# Patient Record
Sex: MALE | Race: BLACK OR AFRICAN AMERICAN | NOT HISPANIC OR LATINO | Employment: OTHER | URBAN - METROPOLITAN AREA
[De-identification: names, ages, dates, MRNs, and addresses within clinical notes are randomized per-mention and may not be internally consistent; named-entity substitution may affect disease eponyms.]

---

## 2017-03-13 PROBLEM — R51.9 HEADACHE: Status: ACTIVE | Noted: 2017-03-13

## 2017-03-13 PROBLEM — M79.89 FOOT SWELLING: Status: ACTIVE | Noted: 2017-03-13

## 2017-06-05 DIAGNOSIS — M54.50 LUMBAGO: Primary | ICD-10-CM

## 2017-06-06 DIAGNOSIS — M54.50 LUMBAGO: Primary | ICD-10-CM

## 2017-08-16 DIAGNOSIS — I95.1 ORTHOSTATIC HYPOTENSION DYSAUTONOMIC SYNDROME: Primary | ICD-10-CM

## 2017-08-31 ENCOUNTER — HOSPITAL ENCOUNTER (OUTPATIENT)
Facility: HOSPITAL | Age: 82
Discharge: NURSING, REHAB OR LONG-TERM CARE | End: 2017-09-02
Attending: EMERGENCY MEDICINE | Admitting: INTERNAL MEDICINE
Payer: MEDICARE

## 2017-08-31 DIAGNOSIS — R41.0 DELIRIUM: Primary | ICD-10-CM

## 2017-08-31 LAB
ALBUMIN SERPL BCP-MCNC: 3.1 G/DL
ALP SERPL-CCNC: 51 U/L
ALT SERPL W/O P-5'-P-CCNC: 17 U/L
ANION GAP SERPL CALC-SCNC: 7 MMOL/L
AST SERPL-CCNC: 20 U/L
BASOPHILS # BLD AUTO: 0.01 K/UL
BASOPHILS NFR BLD: 0.1 %
BILIRUB SERPL-MCNC: 1.2 MG/DL
BUN SERPL-MCNC: 15 MG/DL
CALCIUM SERPL-MCNC: 9 MG/DL
CHLORIDE SERPL-SCNC: 105 MMOL/L
CO2 SERPL-SCNC: 25 MMOL/L
CREAT SERPL-MCNC: 1 MG/DL
DIFFERENTIAL METHOD: ABNORMAL
EOSINOPHIL # BLD AUTO: 0.1 K/UL
EOSINOPHIL NFR BLD: 0.8 %
ERYTHROCYTE [DISTWIDTH] IN BLOOD BY AUTOMATED COUNT: 12.3 %
EST. GFR  (AFRICAN AMERICAN): >60 ML/MIN/1.73 M^2
EST. GFR  (NON AFRICAN AMERICAN): >60 ML/MIN/1.73 M^2
GLUCOSE SERPL-MCNC: 115 MG/DL
HCT VFR BLD AUTO: 35.7 %
HGB BLD-MCNC: 12.4 G/DL
LACTATE SERPL-SCNC: 1.9 MMOL/L
LIPASE SERPL-CCNC: 13 U/L
LYMPHOCYTES # BLD AUTO: 2 K/UL
LYMPHOCYTES NFR BLD: 25.9 %
MAGNESIUM SERPL-MCNC: 2 MG/DL
MCH RBC QN AUTO: 32.9 PG
MCHC RBC AUTO-ENTMCNC: 34.7 G/DL
MCV RBC AUTO: 95 FL
MONOCYTES # BLD AUTO: 0.5 K/UL
MONOCYTES NFR BLD: 6.3 %
NEUTROPHILS # BLD AUTO: 5.1 K/UL
NEUTROPHILS NFR BLD: 66.4 %
PHOSPHATE SERPL-MCNC: 2.5 MG/DL
PLATELET # BLD AUTO: 133 K/UL
PMV BLD AUTO: 10.1 FL
POTASSIUM SERPL-SCNC: 4.2 MMOL/L
PROT SERPL-MCNC: 6.6 G/DL
RBC # BLD AUTO: 3.77 M/UL
SODIUM SERPL-SCNC: 137 MMOL/L
WBC # BLD AUTO: 7.62 K/UL

## 2017-08-31 PROCEDURE — 83605 ASSAY OF LACTIC ACID: CPT

## 2017-08-31 PROCEDURE — P9612 CATHETERIZE FOR URINE SPEC: HCPCS

## 2017-08-31 PROCEDURE — 99285 EMERGENCY DEPT VISIT HI MDM: CPT | Mod: 25

## 2017-08-31 PROCEDURE — 93010 ELECTROCARDIOGRAM REPORT: CPT | Mod: ,,, | Performed by: INTERNAL MEDICINE

## 2017-08-31 PROCEDURE — 85025 COMPLETE CBC W/AUTO DIFF WBC: CPT

## 2017-08-31 PROCEDURE — 93005 ELECTROCARDIOGRAM TRACING: CPT

## 2017-08-31 PROCEDURE — 87040 BLOOD CULTURE FOR BACTERIA: CPT | Mod: 59

## 2017-08-31 PROCEDURE — 83735 ASSAY OF MAGNESIUM: CPT

## 2017-08-31 PROCEDURE — 99285 EMERGENCY DEPT VISIT HI MDM: CPT | Mod: ,,, | Performed by: EMERGENCY MEDICINE

## 2017-08-31 PROCEDURE — 80053 COMPREHEN METABOLIC PANEL: CPT

## 2017-08-31 PROCEDURE — 84100 ASSAY OF PHOSPHORUS: CPT

## 2017-08-31 PROCEDURE — 63600175 PHARM REV CODE 636 W HCPCS: Performed by: STUDENT IN AN ORGANIZED HEALTH CARE EDUCATION/TRAINING PROGRAM

## 2017-08-31 PROCEDURE — 83690 ASSAY OF LIPASE: CPT

## 2017-08-31 PROCEDURE — 96372 THER/PROPH/DIAG INJ SC/IM: CPT

## 2017-08-31 RX ORDER — HALOPERIDOL 5 MG/ML
5 INJECTION INTRAMUSCULAR
Status: COMPLETED | OUTPATIENT
Start: 2017-08-31 | End: 2017-08-31

## 2017-08-31 RX ADMIN — HALOPERIDOL LACTATE 5 MG: 5 INJECTION, SOLUTION INTRAMUSCULAR at 10:08

## 2017-09-01 PROBLEM — R41.0 DELIRIUM: Status: ACTIVE | Noted: 2017-09-01

## 2017-09-01 PROBLEM — R41.0 DELIRIUM: Status: RESOLVED | Noted: 2017-09-01 | Resolved: 2017-09-01

## 2017-09-01 PROBLEM — R51.9 HEADACHE: Status: RESOLVED | Noted: 2017-03-13 | Resolved: 2017-09-01

## 2017-09-01 PROBLEM — G90.9 AUTONOMIC INSTABILITY: Chronic | Status: ACTIVE | Noted: 2017-09-01

## 2017-09-01 PROBLEM — M79.89 FOOT SWELLING: Status: RESOLVED | Noted: 2017-03-13 | Resolved: 2017-09-01

## 2017-09-01 LAB
ANION GAP SERPL CALC-SCNC: 9 MMOL/L
BASOPHILS # BLD AUTO: 0.02 K/UL
BASOPHILS NFR BLD: 0.3 %
BILIRUB UR QL STRIP: NEGATIVE
BUN SERPL-MCNC: 14 MG/DL
CALCIUM SERPL-MCNC: 9.1 MG/DL
CHLORIDE SERPL-SCNC: 106 MMOL/L
CLARITY UR REFRACT.AUTO: CLEAR
CO2 SERPL-SCNC: 27 MMOL/L
COLOR UR AUTO: YELLOW
CREAT SERPL-MCNC: 1 MG/DL
DIFFERENTIAL METHOD: ABNORMAL
EOSINOPHIL # BLD AUTO: 0.1 K/UL
EOSINOPHIL NFR BLD: 1.1 %
ERYTHROCYTE [DISTWIDTH] IN BLOOD BY AUTOMATED COUNT: 12.5 %
EST. GFR  (AFRICAN AMERICAN): >60 ML/MIN/1.73 M^2
EST. GFR  (NON AFRICAN AMERICAN): >60 ML/MIN/1.73 M^2
GLUCOSE SERPL-MCNC: 100 MG/DL
GLUCOSE UR QL STRIP: NEGATIVE
HCT VFR BLD AUTO: 34.7 %
HGB BLD-MCNC: 11.6 G/DL
HGB UR QL STRIP: NEGATIVE
KETONES UR QL STRIP: NEGATIVE
LEUKOCYTE ESTERASE UR QL STRIP: NEGATIVE
LYMPHOCYTES # BLD AUTO: 1.8 K/UL
LYMPHOCYTES NFR BLD: 24.5 %
MAGNESIUM SERPL-MCNC: 2.2 MG/DL
MCH RBC QN AUTO: 32 PG
MCHC RBC AUTO-ENTMCNC: 33.4 G/DL
MCV RBC AUTO: 96 FL
MONOCYTES # BLD AUTO: 0.5 K/UL
MONOCYTES NFR BLD: 7.4 %
NEUTROPHILS # BLD AUTO: 4.7 K/UL
NEUTROPHILS NFR BLD: 66.1 %
NITRITE UR QL STRIP: NEGATIVE
PH UR STRIP: 5 [PH] (ref 5–8)
PHOSPHATE SERPL-MCNC: 3.3 MG/DL
PLATELET # BLD AUTO: 148 K/UL
PMV BLD AUTO: 9.9 FL
POTASSIUM SERPL-SCNC: 3.9 MMOL/L
PROT UR QL STRIP: NEGATIVE
RBC # BLD AUTO: 3.62 M/UL
SODIUM SERPL-SCNC: 142 MMOL/L
SP GR UR STRIP: 1.01 (ref 1–1.03)
TSH SERPL DL<=0.005 MIU/L-ACNC: 2.21 UIU/ML
URN SPEC COLLECT METH UR: NORMAL
UROBILINOGEN UR STRIP-ACNC: NEGATIVE EU/DL
VIT B12 SERPL-MCNC: 228 PG/ML
WBC # BLD AUTO: 7.14 K/UL

## 2017-09-01 PROCEDURE — 81003 URINALYSIS AUTO W/O SCOPE: CPT

## 2017-09-01 PROCEDURE — 25000003 PHARM REV CODE 250: Performed by: PHYSICIAN ASSISTANT

## 2017-09-01 PROCEDURE — G8979 MOBILITY GOAL STATUS: HCPCS | Mod: CL

## 2017-09-01 PROCEDURE — G8980 MOBILITY D/C STATUS: HCPCS | Mod: CM

## 2017-09-01 PROCEDURE — 83735 ASSAY OF MAGNESIUM: CPT

## 2017-09-01 PROCEDURE — 80048 BASIC METABOLIC PNL TOTAL CA: CPT

## 2017-09-01 PROCEDURE — 36415 COLL VENOUS BLD VENIPUNCTURE: CPT

## 2017-09-01 PROCEDURE — 97161 PT EVAL LOW COMPLEX 20 MIN: CPT

## 2017-09-01 PROCEDURE — 85025 COMPLETE CBC W/AUTO DIFF WBC: CPT

## 2017-09-01 PROCEDURE — G0378 HOSPITAL OBSERVATION PER HR: HCPCS

## 2017-09-01 PROCEDURE — 84443 ASSAY THYROID STIM HORMONE: CPT

## 2017-09-01 PROCEDURE — 84100 ASSAY OF PHOSPHORUS: CPT

## 2017-09-01 PROCEDURE — 99220 PR INITIAL OBSERVATION CARE,LEVL III: CPT | Mod: ,,, | Performed by: PHYSICIAN ASSISTANT

## 2017-09-01 PROCEDURE — 82607 VITAMIN B-12: CPT

## 2017-09-01 PROCEDURE — 97530 THERAPEUTIC ACTIVITIES: CPT

## 2017-09-01 PROCEDURE — G8978 MOBILITY CURRENT STATUS: HCPCS | Mod: CM

## 2017-09-01 RX ORDER — IPRATROPIUM BROMIDE AND ALBUTEROL SULFATE 2.5; .5 MG/3ML; MG/3ML
3 SOLUTION RESPIRATORY (INHALATION) EVERY 4 HOURS PRN
Status: DISCONTINUED | OUTPATIENT
Start: 2017-09-01 | End: 2017-09-02 | Stop reason: HOSPADM

## 2017-09-01 RX ORDER — IBUPROFEN 200 MG
24 TABLET ORAL
Status: DISCONTINUED | OUTPATIENT
Start: 2017-09-01 | End: 2017-09-02 | Stop reason: HOSPADM

## 2017-09-01 RX ORDER — GLUCAGON 1 MG
1 KIT INJECTION
Status: DISCONTINUED | OUTPATIENT
Start: 2017-09-01 | End: 2017-09-02 | Stop reason: HOSPADM

## 2017-09-01 RX ORDER — FLUTICASONE PROPIONATE 50 MCG
2 SPRAY, SUSPENSION (ML) NASAL DAILY
Status: DISCONTINUED | OUTPATIENT
Start: 2017-09-01 | End: 2017-09-02 | Stop reason: HOSPADM

## 2017-09-01 RX ORDER — FAMOTIDINE 20 MG/1
20 TABLET, FILM COATED ORAL 2 TIMES DAILY
Status: DISCONTINUED | OUTPATIENT
Start: 2017-09-01 | End: 2017-09-02 | Stop reason: HOSPADM

## 2017-09-01 RX ORDER — LOPERAMIDE HYDROCHLORIDE 2 MG/1
2 CAPSULE ORAL
Status: DISCONTINUED | OUTPATIENT
Start: 2017-09-01 | End: 2017-09-02 | Stop reason: HOSPADM

## 2017-09-01 RX ORDER — TRAMADOL HYDROCHLORIDE 50 MG/1
50 TABLET ORAL ONCE
Status: DISCONTINUED | OUTPATIENT
Start: 2017-09-01 | End: 2017-09-02 | Stop reason: HOSPADM

## 2017-09-01 RX ORDER — RAMELTEON 8 MG/1
8 TABLET ORAL NIGHTLY PRN
Status: DISCONTINUED | OUTPATIENT
Start: 2017-09-01 | End: 2017-09-02 | Stop reason: HOSPADM

## 2017-09-01 RX ORDER — PROMETHAZINE HYDROCHLORIDE 25 MG/1
25 TABLET ORAL EVERY 6 HOURS PRN
Status: DISCONTINUED | OUTPATIENT
Start: 2017-09-01 | End: 2017-09-02 | Stop reason: HOSPADM

## 2017-09-01 RX ORDER — POLYETHYLENE GLYCOL 3350 17 G/17G
17 POWDER, FOR SOLUTION ORAL 3 TIMES DAILY PRN
Status: DISCONTINUED | OUTPATIENT
Start: 2017-09-01 | End: 2017-09-02 | Stop reason: HOSPADM

## 2017-09-01 RX ORDER — ONDANSETRON 8 MG/1
8 TABLET, ORALLY DISINTEGRATING ORAL EVERY 8 HOURS PRN
Status: DISCONTINUED | OUTPATIENT
Start: 2017-09-01 | End: 2017-09-02 | Stop reason: HOSPADM

## 2017-09-01 RX ORDER — AMOXICILLIN 250 MG
1 CAPSULE ORAL 2 TIMES DAILY
Status: DISCONTINUED | OUTPATIENT
Start: 2017-09-01 | End: 2017-09-02 | Stop reason: HOSPADM

## 2017-09-01 RX ORDER — ACETAMINOPHEN 325 MG/1
650 TABLET ORAL EVERY 4 HOURS PRN
Status: DISCONTINUED | OUTPATIENT
Start: 2017-09-01 | End: 2017-09-02 | Stop reason: HOSPADM

## 2017-09-01 RX ORDER — FLUDROCORTISONE ACETATE 0.1 MG/1
100 TABLET ORAL DAILY
Status: DISCONTINUED | OUTPATIENT
Start: 2017-09-01 | End: 2017-09-02

## 2017-09-01 RX ORDER — IBUPROFEN 200 MG
16 TABLET ORAL
Status: DISCONTINUED | OUTPATIENT
Start: 2017-09-01 | End: 2017-09-02 | Stop reason: HOSPADM

## 2017-09-01 RX ADMIN — FAMOTIDINE 20 MG: 20 TABLET, FILM COATED ORAL at 10:09

## 2017-09-01 RX ADMIN — ACETAMINOPHEN 650 MG: 325 TABLET ORAL at 02:09

## 2017-09-01 RX ADMIN — FLUDROCORTISONE ACETATE 100 MCG: 0.1 TABLET ORAL at 10:09

## 2017-09-01 RX ADMIN — STANDARDIZED SENNA CONCENTRATE AND DOCUSATE SODIUM 1 TABLET: 8.6; 5 TABLET, FILM COATED ORAL at 10:09

## 2017-09-01 RX ADMIN — STANDARDIZED SENNA CONCENTRATE AND DOCUSATE SODIUM 1 TABLET: 8.6; 5 TABLET, FILM COATED ORAL at 09:09

## 2017-09-01 RX ADMIN — FAMOTIDINE 20 MG: 20 TABLET, FILM COATED ORAL at 09:09

## 2017-09-01 RX ADMIN — ACETAMINOPHEN 650 MG: 325 TABLET ORAL at 09:09

## 2017-09-01 NOTE — HPI
"Patient is an 85 year old gentleman with a h/o recent shoulder fracture, gastritis, iron deficiency anemia, and autonomic instability.  He presents from [facility] with AMS.  Currently no family at bedside.  Patient sleeping soundly on exam after receiving IV Haldol.  HPI obtained from ER note.  Per ER note, patient was admitted to [facility] on 8/26 with a shoulder fracture following a fall.  No head trauma or LOC was sustained at that time, and patient has not fallen since.  He was discharged to [facility] with Percocet and Tramadol on 8/30.  Patient was alert and oriented until 8/31 at 15:00.  At which point, he became increasingly confused and was only oriented to self on arrival in ER.  He would not follow commands and became highly agitated when attempts were made to start an IV.  He then received IV Haldol with improvement in symptoms.  Per ER note, patient's daughter has noted facial and extremity swelling and facial plethora over the past two days.  He has not verbalized any complaints, but did have a "sticky/foul/brown BM" at 8am.  Prior to fracture, patient ambulated with a walker.  "

## 2017-09-01 NOTE — H&P
"[facility] Hospital Medicine  History & Physical    Patient Name: Tona Dey  MRN: [id number]  Admission Date: 8/31/2017  Attending Physician: Yazan Betancur MD   Primary Care Provider: Josr Mora MD    [facility] Medicine Team: [facility] HOSP MED F Marilu Arnett PA-C     Patient information was obtained from past medical records and ER records.     Subjective:     Principal Problem:Delirium    Chief Complaint:   Chief Complaint   Patient presents with    Altered Mental Status     AMS x1 days. Admitted to [facility] yesterday from [facility] for a shoulder fx. Wife thinks it may be a medication reaction. AO to selft only. Usualy Aox3        HPI: Patient is an 85 year old gentleman with a h/o recent shoulder fracture, gastritis, iron deficiency anemia, and autonomic instability.  He presents from [facility] with AMS.  Currently no family at bedside.  Patient sleeping soundly on exam after receiving IV Haldol.  HPI obtained from ER note.  Per ER note, patient was admitted to [facility] on 8/26 with a shoulder fracture following a fall.  No head trauma or LOC was sustained at that time, and patient has not fallen since.  He was discharged to [facility] with Percocet and Tramadol on 8/30.  Patient was alert and oriented until 8/31 at 15:00.  At which point, he became increasingly confused and was only oriented to self on arrival in ER.  He would not follow commands and became highly agitated when attempts were made to start an IV.  He then received IV Haldol with improvement in symptoms.  Per ER note, patient's daughter has noted facial and extremity swelling and facial plethora over the past two days.  He has not verbalized any complaints, but did have a "sticky/foul/brown BM" at 8am.  Prior to fracture, patient ambulated with a walker.    History reviewed. No pertinent past medical history.    Past Surgical History:   Procedure Laterality Date    BACK SURGERY  1961       Review of " patient's allergies indicates:  No Known Allergies    No current facility-administered medications on file prior to encounter.      Current Outpatient Prescriptions on File Prior to Encounter   Medication Sig    fludrocortisone (FLORINEF) 0.1 mg Tab TAKE 1 TABLET BY MOUTH EVERY DAY    fluticasone (FLONASE) 50 mcg/actuation nasal spray SPRAY 2 SPRAYS IN EACH NOSTRIL ONCE DAILY    ranitidine (ZANTAC) 300 MG tablet     [DISCONTINUED] fluticasone (FLONASE) 50 mcg/actuation nasal spray SPRAY 2 SPRAYS IN EACH NOSTRIL ONCE DAILY     Family History     Problem Relation (Age of Onset)    Cancer Father    No Known Problems Mother        Social History Main Topics    Smoking status: Former Smoker     Quit date: 8/25/1985    Smokeless tobacco: Never Used    Alcohol use 1.2 oz/week     2 Shots of liquor per week    Drug use: Unknown    Sexual activity: Yes     Partners: Female     Review of Systems   Unable to perform ROS: Mental status change     Objective:     Vital Signs (Most Recent):  Pulse: 85 (09/01/17 0259)  Resp: 15 (09/01/17 0033)  BP: (!) 105/59 (09/01/17 0203)  SpO2: 96 % (09/01/17 0057) Vital Signs (24h Range):  Pulse:  [] 85  Resp:  [15-16] 15  SpO2:  [96 %-100 %] 96 %  BP: (105-164)/(59-87) 105/59     Weight: 90.7 kg (200 lb)  Body mass index is 28.7 kg/m².    Physical Exam   Constitutional: He appears well-developed and well-nourished. He appears lethargic. No distress.   HENT:   Head: Normocephalic and atraumatic.   Eyes: Pupils are equal, round, and reactive to light.   Neck: Neck supple. Carotid bruit is not present. No thyromegaly present.   Cardiovascular: Normal rate and regular rhythm.  Exam reveals no gallop.    No murmur heard.  Pulmonary/Chest: Effort normal and breath sounds normal. No respiratory distress. He has no wheezes.   Abdominal: Bowel sounds are normal. He exhibits no distension. There is no splenomegaly or hepatomegaly. There is no tenderness.   Musculoskeletal: Normal range  of motion. He exhibits no edema (2+ BLE).   Neurological: He appears lethargic. He is disoriented.   Skin: Skin is warm and dry. No rash noted.   Psychiatric: He has a normal mood and affect. His behavior is normal.        Significant Labs: All pertinent labs within the past 24 hours have been reviewed.    Significant Imaging: I have reviewed all pertinent imaging results/findings within the past 24 hours.    Assessment/Plan:     * Delirium    - UA, CT head unremarkable.  - Likely secondary to polypharmacy.  Recently started on Tramadol and Percocet.  Will hold.  - Fall precautions, ambulate with assistance, neuro checks q4hrs.  - Will order PT/OT, SW secondary to recent shoulder fracture.    DELIRIUM BEHAVIOR MANAGEMENT  - Minimize use of restraints; if physical restraints necessary, please utilize medical/chemical prns for agitation.  - Keep shades open and room lit during day and room dim at night in order to promote healthy circadian rhythms.  - Encourage family at bedside  - Keep whiteboard in patient's room current with the date and name of the members of patient's team for easy patient self re-orientation.  - Avoid benzodiazepines, antihistamines, anticholinergics, hypnotics, and minimize opiates while controlling for pain as these medications may exacerbate delirium.        CKD (chronic kidney disease) stage 2, GFR 60-89 ml/min    - Creatinine 1.0, GFR >60.0.  Will monitor.          Autonomic instability    - Continue fludrocortisone 0.1mg daily.  - Per ER note, patient also takes midodrine.  Will need to verify dosage with family in morning.            VTE Risk Mitigation         Ordered     Medium Risk of VTE  Once      09/01/17 0305     Place TIGRE hose  Until discontinued      09/01/17 0306     Place sequential compression device  Until discontinued      09/01/17 0306             Marilu Arnett PA-C  Department of Hospital Medicine   [facility]

## 2017-09-01 NOTE — PLAN OF CARE
[facility]     Department of Hospital Medicine     [address] [place] [address]     [phone] after hours  [phone] fax       NURSING HOME ORDERS    9/2/2017    Admit to Nursing Home:  Skilled Bed                                               Diagnoses:  Active Hospital Problems    Diagnosis  POA    Autonomic instability [G90.9]  Yes     Chronic    CKD (chronic kidney disease) stage 2, GFR 60-89 ml/min [N18.2]  Yes     Chronic      Resolved Hospital Problems    Diagnosis Date Resolved POA    *Delirium [R41.0] 09/01/2017 Yes       Patient is homebound due to:  Delirium    Allergies:Review of patient's allergies indicates:  No Known Allergies    Vitals:  Every shift (Skilled Nursing patients)    Diet: Regular    Acitivities:      - Up in a chair each morning as tolerated   - Ambulate with assistance to bathroom   - Scheduled walks once each shift (every 8 hours)   - May use walker, cane, or self-propelled wheelchair      LABS:  Per facility protocol    Nursing Precautions:    - Aspiration precautions:             - Total assistance with meals            -  Upright 90 degrees befor during and after meals             -  Suction at bedside          - Fall precautions per nursing home protocol   - Decubitus precautions:        -  for positioning   - Pressure reducing foam mattress   - Turn patient every two hours. Use wedge pillows to anchor patient    CONSULTS:      Physical Therapy to evaluate and treat     Occupational Therapy to evaluate and treat     Psychiatry to evaluate and follow patients for delirium    MISCELLANEOUS CARE:                 Routine Skin for Bedridden Patients:  Apply moisture barrier cream to all    skin folds and wet areas in perineal area daily and after baths and                           all bowel movements.      Medications: Discontinue all previous medication orders, if any. See new list below.     Tona Dey  Medication Instructions JOSE:[id number]    Printed on:09/01/17 2143   Medication Information                      fludrocortisone (FLORINEF) 0.1 mg Tab  TAKE 1 TABLET BY MOUTH EVERY DAY             fluticasone (FLONASE) 50 mcg/actuation nasal spray  SPRAY 2 SPRAYS IN EACH NOSTRIL ONCE DAILY             ranitidine (ZANTAC) 300 MG tablet                         _________________________________  Ava Martini PA-C  09/01/2017

## 2017-09-01 NOTE — PLAN OF CARE
Problem: Fall Risk (Adult)  Goal: Absence of Falls  Patient will demonstrate the desired outcomes by discharge/transition of care.   Outcome: Ongoing (interventions implemented as appropriate)  Patient remained free of falls through out shift    Problem: Patient Care Overview  Goal: Plan of Care Review  Outcome: Ongoing (interventions implemented as appropriate)   09/01/17 1704   Coping/Psychosocial   Plan Of Care Reviewed With spouse;patient       Problem: Pressure Ulcer Risk (Neville Scale) (Adult,Obstetrics,Pediatric)  Goal: Skin Integrity  Patient will demonstrate the desired outcomes by discharge/transition of care.   Outcome: Ongoing (interventions implemented as appropriate)  Patient remained clean dry and intact through out shift

## 2017-09-01 NOTE — SUBJECTIVE & OBJECTIVE
"Interval History: overnight in ED pt received haldol.  This morning pt alert and oriented x 3.  Pt reports "I'm sore all over."    Review of Systems   Unable to perform ROS: Mental status change   Musculoskeletal: Positive for arthralgias and myalgias.     Objective:     Vital Signs (Most Recent):  Temp: 98.6 °F (37 °C) (09/01/17 1223)  Pulse: 78 (09/01/17 1223)  Resp: 18 (09/01/17 1223)  BP: (!) 115/56 (09/01/17 1223)  SpO2: 97 % (09/01/17 1223) Vital Signs (24h Range):  Temp:  [98.6 °F (37 °C)-99.4 °F (37.4 °C)] 98.6 °F (37 °C)  Pulse:  [] 78  Resp:  [15-20] 18  SpO2:  [96 %-100 %] 97 %  BP: (105-164)/(56-87) 115/56     Weight: 90.7 kg (200 lb)  Body mass index is 28.7 kg/m².  No intake or output data in the 24 hours ending 09/01/17 1236   Physical Exam   Constitutional: He is oriented to person, place, and time. He appears well-developed and well-nourished. No distress.   HENT:   Head: Normocephalic and atraumatic.   Eyes: Pupils are equal, round, and reactive to light.   Neck: Neck supple. Carotid bruit is not present. No thyromegaly present.   Cardiovascular: Normal rate and regular rhythm.  Exam reveals no gallop.    No murmur heard.  Pulmonary/Chest: Effort normal and breath sounds normal. No respiratory distress. He has no wheezes.   Abdominal: Bowel sounds are normal. He exhibits no distension. There is no splenomegaly or hepatomegaly. There is no tenderness.   Musculoskeletal: Normal range of motion. He exhibits no edema (2+ BLE).   Neurological: He is alert and oriented to person, place, and time. GCS eye subscore is 3. GCS verbal subscore is 5. GCS motor subscore is 6.   Skin: Skin is warm and dry. No rash noted.   Psychiatric: He has a normal mood and affect. His behavior is normal.       Significant Labs: All pertinent labs within the past 24 hours have been reviewed.    Significant Imaging: I have reviewed all pertinent imaging results/findings within the past 24 hours.  "

## 2017-09-01 NOTE — PLAN OF CARE
ENDY was informed that the pt can likely dc tomorrow.  ENDY sent a referral request to [facility].  He will likely be able to dc tomorrow.  SW spoke with [facility] ([place] [phone]) and they will be able to accept the pt back tomorrow. She stated that the wife had questions.  The SW called her and she had questions about dc medications.  ENDY informed the PA who will talk with her at some point today.  ENDY sent orders to them.  The nurse can call report to [facility] at [phone].  The oncall ENDY will need to call the facility if the pt is not transferring.     Erica Real, JOSH  [phone]

## 2017-09-01 NOTE — ED NOTES
Pt becoming increasing aggitated. Pulling monitoring devices off. MD aware and at bedside. Pt redirected and reoriented. Mittens applied to pt at this time.

## 2017-09-01 NOTE — HOSPITAL COURSE
Pt admitted to observation for delirium suspected to be due to polypharmacy.  UA, CT head unremarkable.  Pt received haldol x 1 in the ED due to agitation.  Day 2 and Day 3 pain medications held and pt alert and oriented x 3.  Fall precautions, ambulate with assistance, neuro checks q4hrs.  PT/OT, SW consulted secondary to recent shoulder fracture.  PT/OT recommending return to SNF.  Medications reconciled with pt's wife, pt is to stop taking fludrocortisone and to take midodrine 10 mg tid (BP parameters added).  Follow up with PCP in 1-2 weeks.

## 2017-09-01 NOTE — PLAN OF CARE
Problem: Physical Therapy Goal  Goal: Physical Therapy Goal  Goals to be met by: 17     Patient will increase functional independence with mobility by performin. Supine to sit with Maximum Assistance  2. Sit to supine with Maximum Assistance  3. Sit to stand transfer with Moderate Assistance  4. Bed to chair transfer with Maximum Assistance using appropriate AD.   5. Gait  x 10 feet with Maximum Assistance using appropriate AD.   6. Lower extremity exercise program x10 reps with assistance as needed to improve strength and endurance with functional mobility.           PT evaluation completed. POC and goals established.    Elida Nguyễn, PT, DPT  2017

## 2017-09-01 NOTE — PLAN OF CARE
09/01/17 1030   Discharge Assessment   Assessment Type Discharge Planning Assessment   Confirmed/corrected address and phone number on facesheet? Yes   Assessment information obtained from? Patient   Expected Length of Stay (days) 2   Communicated expected length of stay with patient/caregiver yes   Prior to hospitilization cognitive status: Alert/Oriented   Prior to hospitalization functional status: Assistive Equipment   Current cognitive status: Alert/Oriented   Current Functional Status: Needs Assistance   Lives With spouse  (spouse, Alem Dey ([phone]))   Able to Return to Prior Arrangements yes   Is patient able to care for self after discharge? No   Patient's perception of discharge disposition skilled nursing facility  ([facility])   Equipment Currently Used at Home cane, straight;walker, rolling;shower chair   Do you have any problems affording any of your prescribed medications? No   Is the patient taking medications as prescribed? yes   Does the patient have transportation home? Yes   Transportation Available van, wheelchair accessible   Does the patient receive services at the [facility]? No   Discharge Plan A Skilled Nursing Facility   Discharge Plan B Home Health   Patient/Family In Agreement With Plan yes     Patient resting quietly in bed when CM rounded. No family at the bedside. Patient was admitted with delirium. Patient was recently discharged from [facility] following a fall at home which resulted in a shoulder fracture. Patient was admitted to [facility] on 8/30/17. Sling in use at this time. PT/OT recommending that the patient return to SNF for additional therapy. Patient lives with his spouse, Alem Dey ([phone]), & has equipment to assist with ambulation. Patient stated that he was participating in out patient PT at [facility] prior to fall at home. CM informed ENDY Maldonado ([phone]) of above & requested that she verify if the patient will be able to  return to [facility] over the weekend. Patient stated that he has a new PCP but does not recall her name. Plan to discharge patient back to [facility] SNF or home with home health when medically stable. Will continue to follow.

## 2017-09-01 NOTE — ED PROVIDER NOTES
Encounter Date: 8/31/2017    SCRIBE #1 NOTE: I, Zahra Shanks, am scribing for, and in the presence of, Dr. Betancur.       History     Chief Complaint   Patient presents with    Altered Mental Status     AMS x1 days. Admitted to [facility] yesterday from [facility] for a shoulder fx. Wife thinks it may be a medication reaction. AO to selft only. Usualy Aox3     HPI  Review of patient's allergies indicates:  No Known Allergies  No past medical history on file.  Past Surgical History:   Procedure Laterality Date    BACK SURGERY  1961     Family History   Problem Relation Age of Onset    No Known Problems Mother     Cancer Father      Social History   Substance Use Topics    Smoking status: Former Smoker     Quit date: 8/25/1985    Smokeless tobacco: Not on file    Alcohol use 1.2 oz/week     2 Shots of liquor per week     Review of Systems    Physical Exam     Initial Vitals [08/31/17 1958]   BP Pulse Resp Temp SpO2   (!) 164/87 89 16 -- 96 %      MAP       112.67         Physical Exam    Nursing note and vitals reviewed.        ED Course   Procedures  Labs Reviewed - No data to display          Medical Decision Making:   History:   Old Medical Records: I decided to obtain old medical records.            Scribe Attestation:   Scribe #1: I performed the above scribed service and the documentation accurately describes the services I performed. I attest to the accuracy of the note.    Attending Attestation:           Physician Attestation for Scribe:  Physician Attestation Statement for Scribe #1: I, Dr. Betancur, reviewed documentation, as scribed by Zahra Shanks in my presence, and it is both accurate and complete.                 ED Course      Clinical Impression:   There were no encounter diagnoses.

## 2017-09-01 NOTE — ED PROVIDER NOTES
Encounter Date: 8/31/2017    SCRIBE #1 NOTE: I, Zahra Shanks, am scribing for, and in the presence of,  Dr. Betancur. I have scribed the following portions of the note - the Resident attestation.       History     Chief Complaint   Patient presents with    Altered Mental Status     AMS x1 days. Admitted to [facility] yesterday from [facility] for a shoulder fx. Wife thinks it may be a medication reaction. AO to selft only. Usualy Aox3     Mr. Dey is an 85 year old  male who presents today for AMS from [facility] with a relevant PMHx of recent shoulder fracture, gastritis, iron def anemia and autonomic instability. His wife, Renetta, provided the below history due to Mr. Dey's AMS. He fell on Sunday 8/26 and was admitted to [facility], at [facility] he had his medications altered with the addition of fludrocortisone, Percocet and Tramadol to mitodrine. She denied any impact to head or LOC at that time, denies any further falls since discharge from [facility] yesterday. He was then discharged from [facility] and sent to [facility] on 8/30 - he was alert and oriented on discharge and until 8/31 at 3PM. His daughter has noted that facial and extremity swelling and facial plethora started in the last two days. She noted that he didn't verbalize any physical complaints, had a sticky/foul/brown BM 8 AM this morning. Was ambulatory with a walker prior to fall on 8/26.         Review of patient's allergies indicates:  No Known Allergies  History reviewed. No pertinent past medical history.  Past Surgical History:   Procedure Laterality Date    BACK SURGERY  1961     Family History   Problem Relation Age of Onset    No Known Problems Mother     Cancer Father      Social History   Substance Use Topics    Smoking status: Former Smoker     Quit date: 8/25/1985    Smokeless tobacco: Never Used    Alcohol use 1.2 oz/week     2 Shots of liquor per week     Review of Systems   Unable to perform ROS: Mental status change        Physical Exam     Initial Vitals [08/31/17 1958]   BP Pulse Resp Temp SpO2   (!) 164/87 89 16 -- 96 %      MAP       112.67         Physical Exam    Nursing note and vitals reviewed.  Constitutional: Vital signs are normal. He appears well-developed and well-nourished.   Eyes: EOM are normal. Pupils are equal, round, and reactive to light.   Cardiovascular: Normal rate, regular rhythm, S1 normal, S2 normal, normal heart sounds and intact distal pulses.   No murmur heard.  Pulses:       Radial pulses are 2+ on the right side, and 2+ on the left side.        Dorsalis pedis pulses are 2+ on the right side, and 2+ on the left side.   Pulmonary/Chest: Breath sounds normal.   Abdominal: Soft. Bowel sounds are normal. He exhibits no distension and no mass. There is no hepatosplenomegaly. There is tenderness in the left lower quadrant. There is no guarding.   Musculoskeletal: He exhibits edema.   Bilateral edema to the knees   Neurological: He is alert. GCS eye subscore is 3. GCS verbal subscore is 4. GCS motor subscore is 6.   Oriented to person, believes he is at [facility] and is aware that it is 2017     Skin: Skin is warm, dry and intact.   Malar erythema     Psychiatric:   Failed SAVEAHAART (Zero correct)         ED Course   Procedures  Labs Reviewed   CBC W/ AUTO DIFFERENTIAL - Abnormal; Notable for the following:        Result Value    RBC 3.77 (*)     Hemoglobin 12.4 (*)     Hematocrit 35.7 (*)     MCH 32.9 (*)     Platelets 133 (*)     All other components within normal limits   COMPREHENSIVE METABOLIC PANEL - Abnormal; Notable for the following:     Glucose 115 (*)     Albumin 3.1 (*)     Total Bilirubin 1.2 (*)     Alkaline Phosphatase 51 (*)     Anion Gap 7 (*)     All other components within normal limits   PHOSPHORUS - Abnormal; Notable for the following:     Phosphorus 2.5 (*)     All other components within normal limits   LACTIC ACID, PLASMA   LIPASE   MAGNESIUM   URINALYSIS, REFLEX TO URINE CULTURE     Narrative:     Preferred Collection Type->Urine, Clean Catch             Medical Decision Making:   History:   Old Medical Records: I decided to obtain old medical records.  Initial Assessment:   85 year old male with altered mental status, extremity swelling, facial plethora and fractured R shoulder  Differential Diagnosis:   Epidural hematoma  Fludrocortisone overdose  UTI  PNA  Delirium      Clinical Tests:   Lab Tests: Reviewed and Ordered  Radiological Study: Reviewed and Ordered  Medical Tests: Reviewed and Ordered       APC / Resident Notes:   PGY-1 Dazey of Care:    I have assumed care of this patient from the off going physician with whom I have discussed the case. The patient has been seen and examined by me and I agree with the medical record and the plan for treatment.    AOC summary:    85 y.o. male was seen on the previous shift for acute change in mental status.     He was a relatively new patient, w/ prior physician completing HPI, physical exam, and initial order set.    He was agitated and uncooperative w/ IV access so 5 mg Haldol was administered.  No other meds/gluids given.    Patient's vitals have been grossly normal.  Afebrile, normal rate, TAMI, and SBP 130s-160s.    I agree w/ the physical exam provided.  I note normal heart, lungs, & abdomen.  I note BLLE, plethoric face w/ erythema over cheeks.  Patient's skin feels warm to touch.    I am continuing workup for AMS/delerium, w/ concern for medication causes (steroid delerium, tramadol, opioids), infectious, metabolic/electrolyte.  I will also workup for ICH.    Rc Lopez MD  Emergency Medicine PGY-1    PGY-1 UPDATE:    Patient reassessed.  No changes to physical exam.  He is resting comfortably in bed.  Vitals WNL.    Workup grossly normal.  No evidence pointing to etiology.  Head CT normal, electrolytes grossly normal (phos a little low), no evidence of infection (nl WBC), no UTI.      At this point I'm concerned that this  acute delirium is secondary to poly-pharmacy.  I consulted medicine and patient will be transferred to observation for higher level care.      Rc Lopez MD  Emergency Medicine, PGY-1  1:48 AM 9/1/2017         Scribe Attestation:   Scribe #1: I performed the above scribed service and the documentation accurately describes the services I performed. I attest to the accuracy of the note.    Attending Attestation:   Physician Attestation Statement for Resident:  As the supervising MD   Physician Attestation Statement: I have personally seen and examined this patient.   I agree with the above history. -: AMS for 6 hours. Pt went to rehab after shoulder fracture who is on severe pain medications. Will do septic workup and CT head. He feels warm and will anticipate admission.   As the supervising MD I agree with the above PE.    As the supervising MD I agree with the above treatment, course, plan, and disposition.          Physician Attestation for Scribe:  Physician Attestation Statement for Scribe #1: I, Dr. Betancur, reviewed documentation, as scribed by Zahra Shanks in my presence, and it is both accurate and complete.                 ED Course      Clinical Impression:   The encounter diagnosis was Delirium.                           Yazan Betancur MD  09/01/17 0956

## 2017-09-01 NOTE — ASSESSMENT & PLAN NOTE
- Continue fludrocortisone 0.1mg daily.  - Per ER note, patient also takes midodrine.  Will need to verify dosage with family in morning.

## 2017-09-01 NOTE — PROGRESS NOTES
"[facility] Hospital Medicine  Progress Note    Patient Name: Tona Dey  MRN: [id number]  Patient Class: OP- Observation   Admission Date: 8/31/2017  Length of Stay: 0 days  Attending Physician: Zev Cárdenas MD  Primary Care Provider: Josr Mora MD    [facility] Medicine Team: [facility] HOSP MED F Ava Martini PA-C    Subjective:     Principal Problem:Delirium    HPI:  Patient is an 85 year old gentleman with a h/o recent shoulder fracture, gastritis, iron deficiency anemia, and autonomic instability.  He presents from [facility] with AMS.  Currently no family at bedside.  Patient sleeping soundly on exam after receiving IV Haldol.  HPI obtained from ER note.  Per ER note, patient was admitted to [facility] on 8/26 with a shoulder fracture following a fall.  No head trauma or LOC was sustained at that time, and patient has not fallen since.  He was discharged to [facility] with Percocet and Tramadol on 8/30.  Patient was alert and oriented until 8/31 at 15:00.  At which point, he became increasingly confused and was only oriented to self on arrival in ER.  He would not follow commands and became highly agitated when attempts were made to start an IV.  He then received IV Haldol with improvement in symptoms.  Per ER note, patient's daughter has noted facial and extremity swelling and facial plethora over the past two days.  He has not verbalized any complaints, but did have a "sticky/foul/brown BM" at 8am.  Prior to fracture, patient ambulated with a walker.    Hospital Course:  Pt admitted to observation for delirium suspected to be due to polypharmacy.  UA, CT head unremarkable.  Pt received haldol x 1 in the ED.  Day 2 pain medications held and pt alert and oriented x 3.  Fall precautions, ambulate with assistance, neuro checks q4hrs.  PT/OT, SW secondary to recent shoulder fracture.    Interval History: overnight in ED pt received haldol.  This morning pt alert and oriented x 3.  Pt " "reports "I'm sore all over."    Review of Systems   Unable to perform ROS: Mental status change   Musculoskeletal: Positive for arthralgias and myalgias.     Objective:     Vital Signs (Most Recent):  Temp: 98.6 °F (37 °C) (09/01/17 1223)  Pulse: 78 (09/01/17 1223)  Resp: 18 (09/01/17 1223)  BP: (!) 115/56 (09/01/17 1223)  SpO2: 97 % (09/01/17 1223) Vital Signs (24h Range):  Temp:  [98.6 °F (37 °C)-99.4 °F (37.4 °C)] 98.6 °F (37 °C)  Pulse:  [] 78  Resp:  [15-20] 18  SpO2:  [96 %-100 %] 97 %  BP: (105-164)/(56-87) 115/56     Weight: 90.7 kg (200 lb)  Body mass index is 28.7 kg/m².  No intake or output data in the 24 hours ending 09/01/17 1236   Physical Exam   Constitutional: He is oriented to person, place, and time. He appears well-developed and well-nourished. No distress.   HENT:   Head: Normocephalic and atraumatic.   Eyes: Pupils are equal, round, and reactive to light.   Neck: Neck supple. Carotid bruit is not present. No thyromegaly present.   Cardiovascular: Normal rate and regular rhythm.  Exam reveals no gallop.    No murmur heard.  Pulmonary/Chest: Effort normal and breath sounds normal. No respiratory distress. He has no wheezes.   Abdominal: Bowel sounds are normal. He exhibits no distension. There is no splenomegaly or hepatomegaly. There is no tenderness.   Musculoskeletal: Normal range of motion. He exhibits no edema (2+ BLE).   Neurological: He is alert and oriented to person, place, and time. GCS eye subscore is 3. GCS verbal subscore is 5. GCS motor subscore is 6.   Skin: Skin is warm and dry. No rash noted.   Psychiatric: He has a normal mood and affect. His behavior is normal.       Significant Labs: All pertinent labs within the past 24 hours have been reviewed.    Significant Imaging: I have reviewed all pertinent imaging results/findings within the past 24 hours.    Assessment/Plan:      CKD (chronic kidney disease) stage 2, GFR 60-89 ml/min    - Creatinine 1.0, GFR >60.0.  Will " monitor.          Autonomic instability    - Continue fludrocortisone 0.1mg daily.  - Per ER note, patient also takes midodrine.  Will need to verify dosage with family in morning.            VTE Risk Mitigation         Ordered     Medium Risk of VTE  Once      09/01/17 0305     Place TIGRE hose  Until discontinued      09/01/17 0306     Place sequential compression device  Until discontinued      09/01/17 0306              Ava Martini PA-C  Department of Hospital Medicine   [facility]

## 2017-09-01 NOTE — PLAN OF CARE
ENDY set up a stretcher with [facility] to transport patient tomorrow (PHN auth [id number]) with stretcher.  It has been placed in will call.    Erica Real, [facility]  [phone]

## 2017-09-01 NOTE — SUBJECTIVE & OBJECTIVE
History reviewed. No pertinent past medical history.    Past Surgical History:   Procedure Laterality Date    BACK SURGERY  1961       Review of patient's allergies indicates:  No Known Allergies    No current facility-administered medications on file prior to encounter.      Current Outpatient Prescriptions on File Prior to Encounter   Medication Sig    fludrocortisone (FLORINEF) 0.1 mg Tab TAKE 1 TABLET BY MOUTH EVERY DAY    fluticasone (FLONASE) 50 mcg/actuation nasal spray SPRAY 2 SPRAYS IN EACH NOSTRIL ONCE DAILY    ranitidine (ZANTAC) 300 MG tablet     [DISCONTINUED] fluticasone (FLONASE) 50 mcg/actuation nasal spray SPRAY 2 SPRAYS IN EACH NOSTRIL ONCE DAILY     Family History     Problem Relation (Age of Onset)    Cancer Father    No Known Problems Mother        Social History Main Topics    Smoking status: Former Smoker     Quit date: 8/25/1985    Smokeless tobacco: Never Used    Alcohol use 1.2 oz/week     2 Shots of liquor per week    Drug use: Unknown    Sexual activity: Yes     Partners: Female     Review of Systems   Unable to perform ROS: Mental status change     Objective:     Vital Signs (Most Recent):  Pulse: 85 (09/01/17 0259)  Resp: 15 (09/01/17 0033)  BP: (!) 105/59 (09/01/17 0203)  SpO2: 96 % (09/01/17 0057) Vital Signs (24h Range):  Pulse:  [] 85  Resp:  [15-16] 15  SpO2:  [96 %-100 %] 96 %  BP: (105-164)/(59-87) 105/59     Weight: 90.7 kg (200 lb)  Body mass index is 28.7 kg/m².    Physical Exam   Constitutional: He appears well-developed and well-nourished. He appears lethargic. No distress.   HENT:   Head: Normocephalic and atraumatic.   Eyes: Pupils are equal, round, and reactive to light.   Neck: Neck supple. Carotid bruit is not present. No thyromegaly present.   Cardiovascular: Normal rate and regular rhythm.  Exam reveals no gallop.    No murmur heard.  Pulmonary/Chest: Effort normal and breath sounds normal. No respiratory distress. He has no wheezes.   Abdominal: Bowel  sounds are normal. He exhibits no distension. There is no splenomegaly or hepatomegaly. There is no tenderness.   Musculoskeletal: Normal range of motion. He exhibits no edema (2+ BLE).   Neurological: He appears lethargic. He is disoriented.   Skin: Skin is warm and dry. No rash noted.   Psychiatric: He has a normal mood and affect. His behavior is normal.        Significant Labs: All pertinent labs within the past 24 hours have been reviewed.    Significant Imaging: I have reviewed all pertinent imaging results/findings within the past 24 hours.

## 2017-09-01 NOTE — PT/OT/SLP EVAL
Physical Therapy  Evaluation    Tona Dey   MRN: [id number]   Admitting Diagnosis: Delirium    PT Received On: 09/01/17  PT Start Time: 1106     PT Stop Time: 1136    PT Total Time (min): 30 min       Billable Minutes:  Evaluation 15 and Therapeutic Activity 15     Personal factors/comorbidities: n/a. The listed co-morbidities and personal factors impact pt's current level and progress with functional mobility and independence.   Body systems elements affected: lower extremities, upper extremities, trunk, musculoskeletal system, neuromuscular system, cardiovascular, pulmonary system, integumentary system; orientation/level of consciousness  Impairments: see assessment below  Clinical Presentation: stable  Functional Outcome Tools: AMPAC, MMT, ROM  Evaluation Complexity Level: low      Diagnosis: Delirium  R shoulder fracture, in sling    History reviewed. No pertinent past medical history.   Past Surgical History:   Procedure Laterality Date    BACK SURGERY  1961       Referring physician: Marilu Arnett PA-C  Date referred to PT: 09/01/17       General Precautions: Standard, fall  Orthopedic Precautions: RUE non weight bearing   Braces: UE Sling (R shoulder)       Do you have any cultural, spiritual, Yazidism conflicts, given your current situation?: none stated    Patient History:  Pt provided PLOF and living environment. Unsure if information is up to date and accurate. No family present at time of PT visit. Will need to confirm as able. Per chart, pt admitted from [facility].     Lives with: wife  Lives in: [facility], [address] [facility]  PLOF/Level of assist: Mod (I) using RW for mobility; (I) with most of ADLs (reports someone comes in a couple times a week to assist with bathing; pt does sponge baths due to fear of falling in bathroom).   Bath: tub/shower  DME: RW     Roles/responsibilities: father, , grandfather  Hobbies/Interests: watching tv (Gunsmoke); retired  &     Assist  "available upon d/c: TBD      Subjective:  Communicated with RN prior to session.  Pt agreeable to PT evaluation. RN reported pt received haldol overnight for agitation.  Pt drowsy, but easily aroused & cooperative.     Chief Complaint: R shoulder and back pain/discomfort (no rating stated)  Patient goals: To get better and go home. Pt wants to walk again using RW.       Objective:   Patient found with: telemetry, peripheral IV, bed alarm     Cognitive Exam:  Oriented to: Person, Place (hospital), and Year (2017; initially stated July)  Pt initially stated "I'm here to get therapy for my arm. I fell and broke it." Pt able to identify place as hospital appropriately.    Follows Commands/attention: Follows one-step commands  Communication: clear/fluent  Safety awareness/insight to disability: impaired    Physical Exam:  Postural examination/scapula alignment: Rounded shoulder, Head forward and Posterior pelvic tilt    Skin integrity: Visible skin intact  Edema: Mild R UE    Sensation:   Intact    Upper Extremity Range of Motion:  Right Upper Extremity: Not tested due to in sling; wrist, elbow and hand WFL  Left Upper Extremity: WFL    Upper Extremity Strength:  Right Upper Extremity: not tested  Left Upper Extremity: WFL    Lower Extremity Range of Motion:  Right Lower Extremity: WFL  Left Lower Extremity: WFL    Lower Extremity Strength:  Right Lower Extremity: grossly demonstrated at least 3/5; except hip flexion 3-/5  Left Lower Extremity: grossly demonstrated at least 3/5; except hip flexion 3-/5     Fine motor coordination:  Intact and Impaired  Left hand, finger to nose requires increase time to complete with step by step cueing    Gross motor coordination: WFL    Functional Mobility:  Bed Mobility:  Rolling: Maximum Assistance   Supine to Sit:  Maximum Assistance assist with trunk and B LE's  Sit to supine: Total Assistance assist with trunk and B LE's; decrease eccentric control  Scooting: Maximum Assistance to " EOB; Total via drawsheet towards HOB    Transfers:   Sit to stand:Maximum Assistance with Rolling Walker from EOB RW utilized only for L UE support when standing; requires assist for forward weight-shift and lift. Slight posterior lean noted in standing, but no retropulsion this visit.      Gait:   Unable to perform this visit due to poor weight-shift ability and decrease tolerance to standing.       Balance:  Static Sitting: Stand-by Assistance   Dynamic Sitting: Contact Guard Assistance   Static Standing: Moderate Assistance   Dynamic Standing: Maximum Assistance       Therapeutic Activities/Exercises:  Pt tolerated sitting EOB ~15 minutes with PT CGA for balance. Pt demonstrated posterior pelvic tilt, thoracic kyphosis and downward gaze. Pt did not require L UE for trunk support. Pt demonstrated eyes closed for most of session. Able to open upon command, but unable to sustain for longer than a few seconds.       AM-PAC 6 CLICK MOBILITY  How much help from another person does this patient currently need?   1 = Unable, Total/Dependent Assistance  2 = A lot, Maximum/Moderate Assistance  3 = A little, Minimum/Contact Guard/Supervision  4 = None, Modified [place]/Independent    Turning over in bed (including adjusting bedclothes, sheets and blankets)?: 2  Sitting down on and standing up from a chair with arms (e.g., wheelchair, bedside commode, etc.): 2  Moving from lying on back to sitting on the side of the bed?: 2  Moving to and from a bed to a chair (including a wheelchair)?: 1  Need to walk in hospital room?: 1  Climbing 3-5 steps with a railing?: 1  Total Score: 9     AM-PAC Raw Score CMS G-Code Modifier Level of Impairment Assistance   6 % Total / Unable   7 - 9 CM 80 - 100% Maximal Assist   10 - 14 CL 60 - 80% Moderate Assist   15 - 19 CK 40 - 60% Moderate Assist   20 - 22 CJ 20 - 40% Minimal Assist   23 CI 1-20% SBA / CGA   24 CH 0% Independent/ Mod I     Patient left supine with all lines intact  and call button in reach.    Assessment:   Tona Dey is a 85 y.o. male with a medical diagnosis of Delirium and presents with decrease endurance, generalized weakness, impaired R UE mobility & use with functional tasks due to orthopedic precautions and limitations.  Pt tolerated session well.   Pt would benefit from continued skilled physical therapy for the listed impairments to improve functional independence and overall safety with mobility prior to d/c. PT recommends d/c disposition to SNF for further PT/OT intervention to increase strength and endurance with mobility and transfers to assist caregivers with care at this time. Pt remains motivated to participate, but limited in today's visit due to being drowsy.      Education:  Education provided to pt regarding: PT role/POC; safety with mobility. Verbalized understanding.     Whiteboard updated with correct mobility information. RN/PCT notified.  Transfer with therapy only at this time. Until pt is more alert and able to tolerate more upright mobility with appropriate safety.         Rehab identified problem list/impairments: Rehab identified problem list/impairments: weakness, impaired endurance, gait instability, impaired balance, decreased upper extremity function, decreased lower extremity function, impaired self care skills, impaired functional mobilty, impaired fine motor, pain, decreased coordination, decreased safety awareness, edema, orthopedic precautions    Rehab potential is good.    Activity tolerance: Good    Discharge recommendations: Discharge Facility/Level Of Care Needs: nursing facility, skilled     Barriers to discharge: Barriers to Discharge: Inaccessible home environment, Decreased caregiver support (pt requires increase assist at this time)    Equipment recommendations: Equipment Needed After Discharge:  (TBD at next level of care pending progress)     GOALS:    Physical Therapy Goals        Problem: Physical Therapy Goal    Goal  Priority Disciplines Outcome Goal Variances Interventions   Physical Therapy Goal     PT/OT, PT      Description:  Goals to be met by: 17     Patient will increase functional independence with mobility by performin. Supine to sit with Maximum Assistance  2. Sit to supine with Maximum Assistance  3. Sit to stand transfer with Moderate Assistance  4. Bed to chair transfer with Maximum Assistance using appropriate AD.   5. Gait  x 10 feet with Maximum Assistance using appropriate AD.   6. Lower extremity exercise program x10 reps with assistance as needed to improve strength and endurance with functional mobility.                       PLAN:    Patient to be seen 4 x/week to address the above listed problems via gait training, therapeutic activities, therapeutic exercises, neuromuscular re-education  Plan of Care expires: 10/01/17  Plan of Care reviewed with: patient    Functional Assessment Tool Used: ampac  Score: 9  Functional Limitation: Mobility: Walking and moving around  Mobility: Walking and Moving Around Current Status (): CM  Mobility: Walking and Moving Around Goal Status (): RHINA Nguyễn, PT  2017

## 2017-09-01 NOTE — ASSESSMENT & PLAN NOTE
- UA, CT head unremarkable.  - Likely secondary to polypharmacy.  Recently started on Tramadol and Percocet.  Will hold.  - Fall precautions, ambulate with assistance, neuro checks q4hrs.  - Will order PT/OT, SW secondary to recent shoulder fracture.    DELIRIUM BEHAVIOR MANAGEMENT  - Minimize use of restraints; if physical restraints necessary, please utilize medical/chemical prns for agitation.  - Keep shades open and room lit during day and room dim at night in order to promote healthy circadian rhythms.  - Encourage family at bedside  - Keep whiteboard in patient's room current with the date and name of the members of patient's team for easy patient self re-orientation.  - Avoid benzodiazepines, antihistamines, anticholinergics, hypnotics, and minimize opiates while controlling for pain as these medications may exacerbate delirium.

## 2017-09-01 NOTE — ASSESSMENT & PLAN NOTE
- UA, CT head unremarkable.  Stool studies ordered.  - Likely secondary to polypharmacy.  Recently started on Tramadol and Percocet.  Improved with holding.  - Fall precautions, ambulate with assistance, neuro checks q4hrs.  - PT/OT, SW ordered secondary to recent shoulder fracture.    DELIRIUM BEHAVIOR MANAGEMENT  - Minimize use of restraints; if physical restraints necessary, please utilize medical/chemical prns for agitation.  - Keep shades open and room lit during day and room dim at night in order to promote healthy circadian rhythms.  - Encourage family at bedside  - Keep whiteboard in patient's room current with the date and name of the members of patient's team for easy patient self re-orientation.  - Avoid benzodiazepines, antihistamines, anticholinergics, hypnotics, and minimize opiates while controlling for pain as these medications may exacerbate delirium.

## 2017-09-01 NOTE — ED TRIAGE NOTES
Pt presents to ED from [facility] with c/o AMS. Pt was admitted at [facility] yesterday for rehab s/p right clavical fx. Pt's wife reports they have been giving him oxycodone and tramodol and has become increasing altered since admittance. Pt arrives oriented to self only. Will not follow direction.

## 2017-09-02 VITALS
HEART RATE: 77 BPM | TEMPERATURE: 98 F | OXYGEN SATURATION: 98 % | DIASTOLIC BLOOD PRESSURE: 74 MMHG | HEIGHT: 70 IN | BODY MASS INDEX: 28.63 KG/M2 | SYSTOLIC BLOOD PRESSURE: 145 MMHG | RESPIRATION RATE: 19 BRPM | WEIGHT: 200 LBS

## 2017-09-02 LAB
25(OH)D3+25(OH)D2 SERPL-MCNC: 10 NG/ML
ANION GAP SERPL CALC-SCNC: 8 MMOL/L
B2 MICROGLOB SERPL-MCNC: 1.8 UG/ML
BASOPHILS # BLD AUTO: 0.02 K/UL
BASOPHILS NFR BLD: 0.4 %
BUN SERPL-MCNC: 14 MG/DL
CALCIUM SERPL-MCNC: 8.5 MG/DL
CHLORIDE SERPL-SCNC: 107 MMOL/L
CO2 SERPL-SCNC: 24 MMOL/L
CREAT SERPL-MCNC: 0.9 MG/DL
DIFFERENTIAL METHOD: ABNORMAL
EOSINOPHIL # BLD AUTO: 0.2 K/UL
EOSINOPHIL NFR BLD: 4.1 %
ERYTHROCYTE [DISTWIDTH] IN BLOOD BY AUTOMATED COUNT: 12.4 %
EST. GFR  (AFRICAN AMERICAN): >60 ML/MIN/1.73 M^2
EST. GFR  (NON AFRICAN AMERICAN): >60 ML/MIN/1.73 M^2
GLUCOSE SERPL-MCNC: 104 MG/DL
HCT VFR BLD AUTO: 33 %
HGB BLD-MCNC: 11.1 G/DL
LYMPHOCYTES # BLD AUTO: 2 K/UL
LYMPHOCYTES NFR BLD: 36.4 %
MCH RBC QN AUTO: 32 PG
MCHC RBC AUTO-ENTMCNC: 33.6 G/DL
MCV RBC AUTO: 95 FL
MONOCYTES # BLD AUTO: 0.5 K/UL
MONOCYTES NFR BLD: 10 %
NEUTROPHILS # BLD AUTO: 2.6 K/UL
NEUTROPHILS NFR BLD: 48.5 %
PLATELET # BLD AUTO: 138 K/UL
PMV BLD AUTO: 10 FL
POTASSIUM SERPL-SCNC: 4.2 MMOL/L
RBC # BLD AUTO: 3.47 M/UL
RPR SER QL: NORMAL
SODIUM SERPL-SCNC: 139 MMOL/L
WBC # BLD AUTO: 5.39 K/UL

## 2017-09-02 PROCEDURE — 25000003 PHARM REV CODE 250: Performed by: PHYSICIAN ASSISTANT

## 2017-09-02 PROCEDURE — 82232 ASSAY OF BETA-2 PROTEIN: CPT

## 2017-09-02 PROCEDURE — 82306 VITAMIN D 25 HYDROXY: CPT

## 2017-09-02 PROCEDURE — G0378 HOSPITAL OBSERVATION PER HR: HCPCS

## 2017-09-02 PROCEDURE — 85025 COMPLETE CBC W/AUTO DIFF WBC: CPT

## 2017-09-02 PROCEDURE — 80048 BASIC METABOLIC PNL TOTAL CA: CPT

## 2017-09-02 PROCEDURE — 99217 PR OBSERVATION CARE DISCHARGE: CPT | Mod: ,,, | Performed by: PHYSICIAN ASSISTANT

## 2017-09-02 PROCEDURE — 86592 SYPHILIS TEST NON-TREP QUAL: CPT

## 2017-09-02 PROCEDURE — 84425 ASSAY OF VITAMIN B-1: CPT

## 2017-09-02 RX ORDER — MIDODRINE HYDROCHLORIDE 10 MG/1
10 TABLET ORAL 3 TIMES DAILY
Qty: 90 TABLET | Refills: 0 | Status: ON HOLD | OUTPATIENT
Start: 2017-09-02 | End: 2017-10-06

## 2017-09-02 RX ORDER — MIDODRINE HYDROCHLORIDE 5 MG/1
10 TABLET ORAL 3 TIMES DAILY
Status: DISCONTINUED | OUTPATIENT
Start: 2017-09-02 | End: 2017-09-02 | Stop reason: HOSPADM

## 2017-09-02 RX ADMIN — FAMOTIDINE 20 MG: 20 TABLET, FILM COATED ORAL at 09:09

## 2017-09-02 RX ADMIN — STANDARDIZED SENNA CONCENTRATE AND DOCUSATE SODIUM 1 TABLET: 8.6; 5 TABLET, FILM COATED ORAL at 09:09

## 2017-09-02 RX ADMIN — MIDODRINE HYDROCHLORIDE 10 MG: 5 TABLET ORAL at 09:09

## 2017-09-02 NOTE — PLAN OF CARE
Plan is for patient to be discharged back to [facility], Skilled bed.  BALJAI spoke with Dary and faxed orders and paperwork to [phone].  She is going to review orders and will call CM back.      Dary did call back and will take the patient, patient will be going to room 143.  Nurse to call report to [phone], and ask for Dary.     Will take [facility] out of will call, it has been authorized by PHN [id number].  Pt will be ready to be discharged when ambulance arrives.     CM notified family member.

## 2017-09-02 NOTE — PLAN OF CARE
[facility]     Department of Hospital Medicine     [address] [place] [address]     [phone] after hours  [phone] fax       NURSING HOME ORDERS    09/02/2017    Admit to Nursing Home:  Skilled Bed                                              Diagnoses:  Active Hospital Problems    Diagnosis  POA    CKD (chronic kidney disease) stage 2, GFR 60-89 ml/min [N18.2]  Yes     Priority: 2      Chronic    Autonomic instability [G90.9]  Yes     Priority: 3      Chronic      Resolved Hospital Problems    Diagnosis Date Resolved POA    *Delirium [R41.0] 09/01/2017 Yes     Priority: 1 - High       Patient is homebound due to:  Delirium    Allergies:Review of patient's allergies indicates:  No Known Allergies    Vitals:       Every shift (Skilled Nursing patients)    Diet: Regular    Acitivities:      - Up in a chair each morning as tolerated   - Ambulate with assistance to bathroom   - Scheduled walks once each shift (every 8 hours)   - May use walker, cane, or self-propelled wheelchair       LABS:  Per facility protocol    Nursing Precautions:     - Aspiration precautions:             - Total assistance with meals            -  Upright 90 degrees befor during and after meals             -  Suction at bedside          - Fall precautions per nursing home protocol   - Decubitus precautions:        -  for positioning   - Pressure reducing foam mattress   - Turn patient every two hours. Use wedge pillows to anchor patient    CONSULTS:     Physical Therapy to evaluate and treat     Occupational Therapy to evaluate and treat     Psychiatry to evaluate and follow patients for delirium    MISCELLANEOUS CARE:     Routine Skin for Bedridden Patients:  Apply moisture barrier cream to all    skin folds and wet areas in perineal area daily and after baths and                           all bowel movements.          Medications: Discontinue all previous medication  orders, if any. See new list below.      TiburcioTona   Home Medication Instructions JOSE:[id number]    Printed on:09/02/17 2230   Medication Information                      fluticasone (FLONASE) 50 mcg/actuation nasal spray  SPRAY 2 SPRAYS IN EACH NOSTRIL ONCE DAILY             midodrine (PROAMATINE) 10 MG tablet  Take 1 tablet (10 mg total) by mouth 3 (three) times daily.  Hold if BP>140/90.             ranitidine (ZANTAC) 300 MG tablet daily.                         _________________________________  Ava Martini PA-C  09/02/2017

## 2017-09-02 NOTE — NURSING
Patient discharged to NH report called to Karla NIX iv removed discharge instructions given patient transferred to facility by [facility] ambulance

## 2017-09-02 NOTE — DISCHARGE SUMMARY
"[facility] Hospital Medicine  Discharge Summary      Patient Name: Tona Dey  MRN: [id number]  Admission Date: 8/31/2017  Hospital Length of Stay: 0 days  Discharge Date and Time:  09/02/2017 8:53 AM  Attending Physician: Zev Cárdenas MD   Discharging Provider: Ava Martini PA-C  Primary Care Provider: No primary care provider on file.  Hospital Medicine Team: [facility] Ava Martini PA-C    HPI:   Patient is an 85 year old gentleman with a h/o recent shoulder fracture, gastritis, iron deficiency anemia, and autonomic instability.  He presents from [facility] with AMS.  Currently no family at bedside.  Patient sleeping soundly on exam after receiving IV Haldol.  HPI obtained from ER note.  Per ER note, patient was admitted to [facility] on 8/26 with a shoulder fracture following a fall.  No head trauma or LOC was sustained at that time, and patient has not fallen since.  He was discharged to [facility] with Percocet and Tramadol on 8/30.  Patient was alert and oriented until 8/31 at 15:00.  At which point, he became increasingly confused and was only oriented to self on arrival in ER.  He would not follow commands and became highly agitated when attempts were made to start an IV.  He then received IV Haldol with improvement in symptoms.  Per ER note, patient's daughter has noted facial and extremity swelling and facial plethora over the past two days.  He has not verbalized any complaints, but did have a "sticky/foul/brown BM" at 8am.  Prior to fracture, patient ambulated with a walker.    * No surgery found *      Indwelling Lines/Drains at time of discharge:   Lines/Drains/Airways          No matching active lines, drains, or airways        Hospital Course:   Pt admitted to observation for delirium suspected to be due to polypharmacy.  UA, CT head unremarkable.  Pt received haldol x 1 in the ED due to agitation.  Day 2 and Day 3 pain medications held and pt alert and " oriented x 3.  Fall precautions, ambulate with assistance, neuro checks q4hrs.  PT/OT, SW consulted secondary to recent shoulder fracture.  PT/OT recommending return to SNF.  Medications reconciled with pt's wife, pt is to stop taking fludrocortisone and to take midodrine 10 mg tid (BP parameters added).  Follow up with PCP in 1-2 weeks.     Consults:   Consults         Status Ordering Provider     Case Management/  Once     Provider:  (Not yet assigned)    ELYSSA Mendoza Diagnostic Studies: Labs: All labs within the past 24 hours have been reviewed    Pending Diagnostic Studies:     Procedure Component Value Units Date/Time    RPR [[id number]] Collected:  09/02/17 0431    Order Status:  Sent Lab Status:  In process Updated:  09/02/17 0509    Specimen:  Blood from Blood     Vitamin B1 [[id number]] Collected:  09/02/17 0431    Order Status:  Sent Lab Status:  In process Updated:  09/02/17 0509    Specimen:  Blood from Blood         Final Active Diagnoses:    Diagnosis Date Noted POA    CKD (chronic kidney disease) stage 2, GFR 60-89 ml/min [N18.2] 07/22/2016 Yes     Chronic    Autonomic instability [G90.9] 09/01/2017 Yes     Chronic      Problems Resolved During this Admission:    Diagnosis Date Noted Date Resolved POA    PRINCIPAL PROBLEM:  Delirium [R41.0] 09/01/2017 09/01/2017 Yes      No new Assessment & Plan notes have been filed under this hospital service since the last note was generated.  Service: Hospital Medicine      Discharged Condition: good    Disposition: Skilled Nursing Facility    Follow Up:    Patient Instructions:     Diet general     Activity as tolerated       Medications:  Reconciled Home Medications:   Current Discharge Medication List      START taking these medications    Details   midodrine (PROAMATINE) 10 MG tablet Take 1 tablet (10 mg total) by mouth 3 (three) times daily.  Qty: 90 tablet, Refills: 0         CONTINUE these medications  which have NOT CHANGED    Details   fluticasone (FLONASE) 50 mcg/actuation nasal spray SPRAY 2 SPRAYS IN EACH NOSTRIL ONCE DAILY  Qty: 16 g, Refills: 2    Comments: **Patient requests 90 days supply**  Associated Diagnoses: Allergic rhinitis due to pollen, unspecified rhinitis seasonality      ranitidine (ZANTAC) 300 MG tablet Refills: 4         STOP taking these medications       fludrocortisone (FLORINEF) 0.1 mg Tab Comments:   Reason for Stopping:             Time spent on the discharge of patient: >30 minutes    HOS POC IP DISCHARGE SUMMARY    Ava Martini PA-C  Department of Hospital Medicine  [facility]

## 2017-09-02 NOTE — PLAN OF CARE
Problem: Patient Care Overview  Goal: Plan of Care Review  Outcome: Ongoing (interventions implemented as appropriate)  Pt AOx3, understands in hospital has trouble with the name occasionally. VSS on RA. Medicated for pain x1 adequate relief. No falls or trauma during shift. Bed in low position with wheels locked and call light in reach.

## 2017-09-02 NOTE — PT/OT/SLP PROGRESS
Occupational Therapy      Tona Dey  MRN: [id number]    Patient not seen today secondary to Other (Comment) (EMS transporting pt to NH from [facility]). Will follow-up as scheduled. Writing therapist attempted pt in AM, but EMS transport present to t/f pt from [facility] to NH.    Evangelista Johnson, OT  9/2/2017

## 2017-09-06 LAB
BACTERIA BLD CULT: NORMAL
BACTERIA BLD CULT: NORMAL
VIT B1 SERPL-MCNC: 40 UG/L (ref 38–122)

## 2017-09-08 NOTE — PT/OT/SLP DISCHARGE
Physical Therapy Discharge Summary    Tona Dey  MRN: [id number]   Delirium     Patient Discharged from acute Physical Therapy on 17.  Please refer to prior PT noted date on 17 for functional status.     Assessment:   Patient was discharge unexpectedly.  Information required to complete and accurate discharge summary is unknown.  Refer to therapy initial evaluation and last progress note for initial and most recent functional status and goal achievement.  Recommendations made may be found in medical record. Patient appropriate for care in another setting. Patient has not met goals.  GOALS:    Physical Therapy Goals     Not on file          Multidisciplinary Problems (Resolved)        Problem: Physical Therapy Goal    Goal Priority Disciplines Outcome Goal Variances Interventions   Physical Therapy Goal   (Resolved)     PT/OT, PT Outcome(s) achieved     Description:  Goals to be met by: 17     Patient will increase functional independence with mobility by performin. Supine to sit with Maximum Assistance  2. Sit to supine with Maximum Assistance  3. Sit to stand transfer with Moderate Assistance  4. Bed to chair transfer with Maximum Assistance using appropriate AD.   5. Gait  x 10 feet with Maximum Assistance using appropriate AD.   6. Lower extremity exercise program x10 reps with assistance as needed to improve strength and endurance with functional mobility.                     Reasons for Discontinuation of Therapy Services  Transfer to alternate level of care.      Plan:  Patient Discharged to: Skilled Nursing Facility.    Elida Nguyễn, PT, DPT  2017

## 2017-10-01 ENCOUNTER — HOSPITAL ENCOUNTER (INPATIENT)
Facility: HOSPITAL | Age: 82
LOS: 8 days | Discharge: NURSING, REHAB OR LONG-TERM CARE | DRG: 299 | End: 2017-10-10
Attending: EMERGENCY MEDICINE | Admitting: INTERNAL MEDICINE
Payer: MEDICARE

## 2017-10-01 DIAGNOSIS — G93.41 ENCEPHALOPATHY, METABOLIC: ICD-10-CM

## 2017-10-01 DIAGNOSIS — R60.0 BILATERAL LOWER EXTREMITY EDEMA: ICD-10-CM

## 2017-10-01 DIAGNOSIS — I35.9 NONRHEUMATIC AORTIC VALVE DISORDER: ICD-10-CM

## 2017-10-01 DIAGNOSIS — I82.432 ACUTE DEEP VEIN THROMBOSIS (DVT) OF POPLITEAL VEIN OF LEFT LOWER EXTREMITY: Primary | ICD-10-CM

## 2017-10-01 DIAGNOSIS — G90.9 AUTONOMIC INSTABILITY: Chronic | ICD-10-CM

## 2017-10-01 DIAGNOSIS — R53.81 PHYSICAL DECONDITIONING: ICD-10-CM

## 2017-10-01 DIAGNOSIS — R60.0 LOWER EXTREMITY EDEMA: ICD-10-CM

## 2017-10-01 DIAGNOSIS — R06.09 DYSPNEA ON EXERTION: ICD-10-CM

## 2017-10-01 LAB
ALBUMIN SERPL BCP-MCNC: 2.9 G/DL
ALP SERPL-CCNC: 91 U/L
ALT SERPL W/O P-5'-P-CCNC: 22 U/L
ANION GAP SERPL CALC-SCNC: 7 MMOL/L
AST SERPL-CCNC: 22 U/L
BASOPHILS # BLD AUTO: 0.04 K/UL
BASOPHILS NFR BLD: 0.7 %
BILIRUB SERPL-MCNC: 0.8 MG/DL
BNP SERPL-MCNC: 70 PG/ML
BUN SERPL-MCNC: 22 MG/DL
CALCIUM SERPL-MCNC: 8.9 MG/DL
CHLORIDE SERPL-SCNC: 108 MMOL/L
CO2 SERPL-SCNC: 25 MMOL/L
CREAT SERPL-MCNC: 1.1 MG/DL
DIFFERENTIAL METHOD: ABNORMAL
EOSINOPHIL # BLD AUTO: 0.2 K/UL
EOSINOPHIL NFR BLD: 2.6 %
ERYTHROCYTE [DISTWIDTH] IN BLOOD BY AUTOMATED COUNT: 12.9 %
EST. GFR  (AFRICAN AMERICAN): >60 ML/MIN/1.73 M^2
EST. GFR  (NON AFRICAN AMERICAN): >60 ML/MIN/1.73 M^2
GLUCOSE SERPL-MCNC: 103 MG/DL
HCT VFR BLD AUTO: 32.2 %
HGB BLD-MCNC: 10.7 G/DL
INR PPP: 1.1
LYMPHOCYTES # BLD AUTO: 1.9 K/UL
LYMPHOCYTES NFR BLD: 33.9 %
MCH RBC QN AUTO: 31.8 PG
MCHC RBC AUTO-ENTMCNC: 33.2 G/DL
MCV RBC AUTO: 96 FL
MONOCYTES # BLD AUTO: 0.4 K/UL
MONOCYTES NFR BLD: 6.1 %
NEUTROPHILS # BLD AUTO: 3.2 K/UL
NEUTROPHILS NFR BLD: 56.7 %
PLATELET # BLD AUTO: 176 K/UL
PMV BLD AUTO: 9.7 FL
POTASSIUM SERPL-SCNC: 4.4 MMOL/L
PROT SERPL-MCNC: 6.5 G/DL
PROTHROMBIN TIME: 11.5 SEC
RBC # BLD AUTO: 3.37 M/UL
SODIUM SERPL-SCNC: 140 MMOL/L
TROPONIN I SERPL DL<=0.01 NG/ML-MCNC: <0.006 NG/ML
TSH SERPL DL<=0.005 MIU/L-ACNC: 2.22 UIU/ML
WBC # BLD AUTO: 5.72 K/UL

## 2017-10-01 PROCEDURE — 85610 PROTHROMBIN TIME: CPT

## 2017-10-01 PROCEDURE — 80053 COMPREHEN METABOLIC PANEL: CPT

## 2017-10-01 PROCEDURE — 85025 COMPLETE CBC W/AUTO DIFF WBC: CPT

## 2017-10-01 PROCEDURE — 94761 N-INVAS EAR/PLS OXIMETRY MLT: CPT

## 2017-10-01 PROCEDURE — 63600175 PHARM REV CODE 636 W HCPCS: Performed by: EMERGENCY MEDICINE

## 2017-10-01 PROCEDURE — 99285 EMERGENCY DEPT VISIT HI MDM: CPT | Mod: ,,, | Performed by: EMERGENCY MEDICINE

## 2017-10-01 PROCEDURE — 96374 THER/PROPH/DIAG INJ IV PUSH: CPT

## 2017-10-01 PROCEDURE — 93005 ELECTROCARDIOGRAM TRACING: CPT

## 2017-10-01 PROCEDURE — 99285 EMERGENCY DEPT VISIT HI MDM: CPT | Mod: 25

## 2017-10-01 PROCEDURE — 84484 ASSAY OF TROPONIN QUANT: CPT

## 2017-10-01 PROCEDURE — 83880 ASSAY OF NATRIURETIC PEPTIDE: CPT

## 2017-10-01 PROCEDURE — 93010 ELECTROCARDIOGRAM REPORT: CPT | Mod: ,,, | Performed by: INTERNAL MEDICINE

## 2017-10-01 PROCEDURE — 84443 ASSAY THYROID STIM HORMONE: CPT

## 2017-10-01 RX ORDER — TRAMADOL HYDROCHLORIDE 50 MG/1
50 TABLET ORAL EVERY 6 HOURS PRN
Status: ON HOLD | COMMUNITY
End: 2017-10-06 | Stop reason: HOSPADM

## 2017-10-01 RX ORDER — FUROSEMIDE 10 MG/ML
40 INJECTION INTRAMUSCULAR; INTRAVENOUS
Status: COMPLETED | OUTPATIENT
Start: 2017-10-01 | End: 2017-10-01

## 2017-10-01 RX ADMIN — FUROSEMIDE 40 MG: 10 INJECTION, SOLUTION INTRAVENOUS at 10:10

## 2017-10-02 PROBLEM — R60.0 LOWER EXTREMITY EDEMA: Status: ACTIVE | Noted: 2017-10-02

## 2017-10-02 PROBLEM — R53.81 PHYSICAL DECONDITIONING: Status: ACTIVE | Noted: 2017-10-02

## 2017-10-02 LAB
ANION GAP SERPL CALC-SCNC: 9 MMOL/L
BASOPHILS # BLD AUTO: 0.03 K/UL
BASOPHILS NFR BLD: 0.5 %
BILIRUB UR QL STRIP: NEGATIVE
BUN SERPL-MCNC: 19 MG/DL
CALCIUM SERPL-MCNC: 8.9 MG/DL
CHLORIDE SERPL-SCNC: 107 MMOL/L
CLARITY UR REFRACT.AUTO: CLEAR
CO2 SERPL-SCNC: 24 MMOL/L
COLOR UR AUTO: NORMAL
CREAT SERPL-MCNC: 0.9 MG/DL
DIASTOLIC DYSFUNCTION: NO
DIFFERENTIAL METHOD: ABNORMAL
EOSINOPHIL # BLD AUTO: 0.1 K/UL
EOSINOPHIL NFR BLD: 2.2 %
ERYTHROCYTE [DISTWIDTH] IN BLOOD BY AUTOMATED COUNT: 12.8 %
EST. GFR  (AFRICAN AMERICAN): >60 ML/MIN/1.73 M^2
EST. GFR  (NON AFRICAN AMERICAN): >60 ML/MIN/1.73 M^2
ESTIMATED PA SYSTOLIC PRESSURE: 38.69
GLUCOSE SERPL-MCNC: 91 MG/DL
GLUCOSE UR QL STRIP: NEGATIVE
HCT VFR BLD AUTO: 31.6 %
HGB BLD-MCNC: 10.6 G/DL
HGB UR QL STRIP: NEGATIVE
INR PPP: 1.1
KETONES UR QL STRIP: NEGATIVE
LEUKOCYTE ESTERASE UR QL STRIP: NEGATIVE
LYMPHOCYTES # BLD AUTO: 2.5 K/UL
LYMPHOCYTES NFR BLD: 42.9 %
MAGNESIUM SERPL-MCNC: 2.2 MG/DL
MCH RBC QN AUTO: 31.3 PG
MCHC RBC AUTO-ENTMCNC: 33.5 G/DL
MCV RBC AUTO: 93 FL
MONOCYTES # BLD AUTO: 0.4 K/UL
MONOCYTES NFR BLD: 6.5 %
NEUTROPHILS # BLD AUTO: 2.7 K/UL
NEUTROPHILS NFR BLD: 46.7 %
NITRITE UR QL STRIP: NEGATIVE
PH UR STRIP: 5 [PH] (ref 5–8)
PHOSPHATE SERPL-MCNC: 3 MG/DL
PLATELET # BLD AUTO: 147 K/UL
PMV BLD AUTO: 10.5 FL
POTASSIUM SERPL-SCNC: 4.7 MMOL/L
PROT UR QL STRIP: NEGATIVE
PROTHROMBIN TIME: 11.7 SEC
RBC # BLD AUTO: 3.39 M/UL
RETIRED EF AND QEF - SEE NOTES: 65 (ref 55–65)
SODIUM SERPL-SCNC: 140 MMOL/L
SP GR UR STRIP: 1.01 (ref 1–1.03)
TRICUSPID VALVE REGURGITATION: NORMAL
URN SPEC COLLECT METH UR: NORMAL
UROBILINOGEN UR STRIP-ACNC: NEGATIVE EU/DL
WBC # BLD AUTO: 5.81 K/UL

## 2017-10-02 PROCEDURE — 93306 TTE W/DOPPLER COMPLETE: CPT

## 2017-10-02 PROCEDURE — 99220 PR INITIAL OBSERVATION CARE,LEVL III: CPT | Mod: ,,, | Performed by: PHYSICIAN ASSISTANT

## 2017-10-02 PROCEDURE — 25000003 PHARM REV CODE 250: Performed by: PHYSICIAN ASSISTANT

## 2017-10-02 PROCEDURE — 63600175 PHARM REV CODE 636 W HCPCS: Performed by: PHYSICIAN ASSISTANT

## 2017-10-02 PROCEDURE — 81003 URINALYSIS AUTO W/O SCOPE: CPT

## 2017-10-02 PROCEDURE — 97162 PT EVAL MOD COMPLEX 30 MIN: CPT

## 2017-10-02 PROCEDURE — 11000001 HC ACUTE MED/SURG PRIVATE ROOM

## 2017-10-02 PROCEDURE — 84100 ASSAY OF PHOSPHORUS: CPT

## 2017-10-02 PROCEDURE — 97530 THERAPEUTIC ACTIVITIES: CPT

## 2017-10-02 PROCEDURE — 80048 BASIC METABOLIC PNL TOTAL CA: CPT

## 2017-10-02 PROCEDURE — 83735 ASSAY OF MAGNESIUM: CPT

## 2017-10-02 PROCEDURE — 36415 COLL VENOUS BLD VENIPUNCTURE: CPT

## 2017-10-02 PROCEDURE — 85025 COMPLETE CBC W/AUTO DIFF WBC: CPT

## 2017-10-02 PROCEDURE — 25000003 PHARM REV CODE 250: Performed by: EMERGENCY MEDICINE

## 2017-10-02 PROCEDURE — 85610 PROTHROMBIN TIME: CPT

## 2017-10-02 PROCEDURE — 93306 TTE W/DOPPLER COMPLETE: CPT | Mod: 26,,, | Performed by: INTERNAL MEDICINE

## 2017-10-02 RX ORDER — WARFARIN SODIUM 5 MG/1
5 TABLET ORAL DAILY
Status: DISCONTINUED | OUTPATIENT
Start: 2017-10-02 | End: 2017-10-05

## 2017-10-02 RX ORDER — FUROSEMIDE 10 MG/ML
40 INJECTION INTRAMUSCULAR; INTRAVENOUS 2 TIMES DAILY
Status: DISCONTINUED | OUTPATIENT
Start: 2017-10-02 | End: 2017-10-05

## 2017-10-02 RX ORDER — FUROSEMIDE 10 MG/ML
40 INJECTION INTRAMUSCULAR; INTRAVENOUS 3 TIMES DAILY
Status: DISCONTINUED | OUTPATIENT
Start: 2017-10-02 | End: 2017-10-02

## 2017-10-02 RX ORDER — AMOXICILLIN 250 MG
1 CAPSULE ORAL 2 TIMES DAILY
Status: DISCONTINUED | OUTPATIENT
Start: 2017-10-02 | End: 2017-10-10 | Stop reason: HOSPADM

## 2017-10-02 RX ORDER — LOPERAMIDE HYDROCHLORIDE 2 MG/1
2 CAPSULE ORAL
Status: DISCONTINUED | OUTPATIENT
Start: 2017-10-02 | End: 2017-10-10 | Stop reason: HOSPADM

## 2017-10-02 RX ORDER — ENOXAPARIN SODIUM 100 MG/ML
1 INJECTION SUBCUTANEOUS
Status: DISCONTINUED | OUTPATIENT
Start: 2017-10-02 | End: 2017-10-09

## 2017-10-02 RX ORDER — IBUPROFEN 200 MG
24 TABLET ORAL
Status: DISCONTINUED | OUTPATIENT
Start: 2017-10-02 | End: 2017-10-10 | Stop reason: HOSPADM

## 2017-10-02 RX ORDER — MIDODRINE HYDROCHLORIDE 5 MG/1
10 TABLET ORAL 3 TIMES DAILY
Status: DISCONTINUED | OUTPATIENT
Start: 2017-10-02 | End: 2017-10-06

## 2017-10-02 RX ORDER — PROMETHAZINE HYDROCHLORIDE 25 MG/1
25 TABLET ORAL EVERY 6 HOURS PRN
Status: DISCONTINUED | OUTPATIENT
Start: 2017-10-02 | End: 2017-10-10 | Stop reason: HOSPADM

## 2017-10-02 RX ORDER — ACETAMINOPHEN 325 MG/1
650 TABLET ORAL EVERY 4 HOURS PRN
Status: DISCONTINUED | OUTPATIENT
Start: 2017-10-02 | End: 2017-10-10 | Stop reason: HOSPADM

## 2017-10-02 RX ORDER — IBUPROFEN 200 MG
16 TABLET ORAL
Status: DISCONTINUED | OUTPATIENT
Start: 2017-10-02 | End: 2017-10-10 | Stop reason: HOSPADM

## 2017-10-02 RX ORDER — ONDANSETRON 8 MG/1
8 TABLET, ORALLY DISINTEGRATING ORAL EVERY 8 HOURS PRN
Status: DISCONTINUED | OUTPATIENT
Start: 2017-10-02 | End: 2017-10-10 | Stop reason: HOSPADM

## 2017-10-02 RX ORDER — GLUCAGON 1 MG
1 KIT INJECTION
Status: DISCONTINUED | OUTPATIENT
Start: 2017-10-02 | End: 2017-10-10 | Stop reason: HOSPADM

## 2017-10-02 RX ORDER — TRAMADOL HYDROCHLORIDE 50 MG/1
50 TABLET ORAL EVERY 6 HOURS PRN
Status: DISCONTINUED | OUTPATIENT
Start: 2017-10-02 | End: 2017-10-06

## 2017-10-02 RX ORDER — IPRATROPIUM BROMIDE AND ALBUTEROL SULFATE 2.5; .5 MG/3ML; MG/3ML
3 SOLUTION RESPIRATORY (INHALATION) EVERY 4 HOURS PRN
Status: DISCONTINUED | OUTPATIENT
Start: 2017-10-02 | End: 2017-10-10 | Stop reason: HOSPADM

## 2017-10-02 RX ORDER — POLYETHYLENE GLYCOL 3350 17 G/17G
17 POWDER, FOR SOLUTION ORAL 3 TIMES DAILY PRN
Status: DISCONTINUED | OUTPATIENT
Start: 2017-10-02 | End: 2017-10-10 | Stop reason: HOSPADM

## 2017-10-02 RX ORDER — RAMELTEON 8 MG/1
8 TABLET ORAL NIGHTLY PRN
Status: DISCONTINUED | OUTPATIENT
Start: 2017-10-02 | End: 2017-10-10 | Stop reason: HOSPADM

## 2017-10-02 RX ADMIN — ENOXAPARIN SODIUM 100 MG: 100 INJECTION SUBCUTANEOUS at 10:10

## 2017-10-02 RX ADMIN — FUROSEMIDE 40 MG: 10 INJECTION, SOLUTION INTRAMUSCULAR; INTRAVENOUS at 06:10

## 2017-10-02 RX ADMIN — STANDARDIZED SENNA CONCENTRATE AND DOCUSATE SODIUM 1 TABLET: 8.6; 5 TABLET, FILM COATED ORAL at 09:10

## 2017-10-02 RX ADMIN — TRAMADOL HYDROCHLORIDE 50 MG: 50 TABLET, FILM COATED ORAL at 03:10

## 2017-10-02 RX ADMIN — MIDODRINE HYDROCHLORIDE 10 MG: 5 TABLET ORAL at 02:10

## 2017-10-02 RX ADMIN — FUROSEMIDE 40 MG: 10 INJECTION, SOLUTION INTRAVENOUS at 02:10

## 2017-10-02 RX ADMIN — STANDARDIZED SENNA CONCENTRATE AND DOCUSATE SODIUM 1 TABLET: 8.6; 5 TABLET, FILM COATED ORAL at 10:10

## 2017-10-02 RX ADMIN — ACETAMINOPHEN 650 MG: 325 TABLET ORAL at 11:10

## 2017-10-02 RX ADMIN — MIDODRINE HYDROCHLORIDE 10 MG: 5 TABLET ORAL at 10:10

## 2017-10-02 RX ADMIN — TRAMADOL HYDROCHLORIDE 50 MG: 50 TABLET, FILM COATED ORAL at 10:10

## 2017-10-02 RX ADMIN — ENOXAPARIN SODIUM 100 MG: 100 INJECTION SUBCUTANEOUS at 09:10

## 2017-10-02 RX ADMIN — WARFARIN SODIUM 5 MG: 5 TABLET ORAL at 05:10

## 2017-10-02 NOTE — ED PROVIDER NOTES
Encounter Date: 10/1/2017    SCRIBE #1 NOTE: I, Ca Bucio, am scribing for, and in the presence of,  Dr. Balderrama. I have scribed the following portions of the note - the EKG reading and the Resident attestation.       History     Chief Complaint   Patient presents with    Leg Swelling     and mild sob      HPI     85-year-old man who takes midodrine by mouth for hypotension presents with shortness of breath, new, started 2 days ago, worsening, exacerbated by exertion, relieved by rest, can walk across the room before getting short of breath whereas he used to be able to walk 100 feet, associated with new lower extremity swelling.  Does not take a diuretic at home.  Denies chest pain.    Review of patient's allergies indicates:  No Known Allergies  History reviewed. No pertinent past medical history.  Past Surgical History:   Procedure Laterality Date    BACK SURGERY  1961     Family History   Problem Relation Age of Onset    No Known Problems Mother     Cancer Father      Social History   Substance Use Topics    Smoking status: Former Smoker     Quit date: 8/25/1985    Smokeless tobacco: Never Used    Alcohol use 1.2 oz/week     2 Shots of liquor per week     Review of Systems   Constitutional: Negative for diaphoresis and fever.   HENT: Negative for drooling and facial swelling.    Eyes: Negative for discharge and redness.   Respiratory: Positive for shortness of breath. Negative for stridor.    Cardiovascular: Positive for leg swelling. Negative for chest pain.   Gastrointestinal: Negative for abdominal pain, nausea and vomiting.   Genitourinary: Negative for dysuria and hematuria.   Musculoskeletal: Negative for joint swelling and neck stiffness.   Skin: Negative for rash and wound.   Neurological: Negative for facial asymmetry and speech difficulty.   Psychiatric/Behavioral: Negative for agitation and confusion.       Physical Exam     Initial Vitals [10/01/17 2115]   BP Pulse Resp Temp SpO2   (!)  113/56 82 20 98.5 °F (36.9 °C) 97 %      MAP       75         Physical Exam    Nursing note and vitals reviewed.  Constitutional: He appears well-developed and well-nourished. He is not diaphoretic. He is Obese . No distress.   HENT:   Head: Normocephalic and atraumatic.   Right Ear: External ear normal.   Left Ear: External ear normal.   Nose: Nose normal.   Mouth/Throat: Oropharynx is clear and moist.   Eyes: Conjunctivae are normal. Pupils are equal, round, and reactive to light. Right eye exhibits no discharge. Left eye exhibits no discharge. No scleral icterus.   Neck: Neck supple. No thyromegaly present. No tracheal deviation present. JVD (2-finger, and JVP) present.   Cardiovascular: Normal rate, regular rhythm, normal heart sounds and intact distal pulses. Exam reveals no gallop and no friction rub.    No murmur heard.  Pulmonary/Chest: Breath sounds normal. No stridor. No respiratory distress. He has no wheezes. He has no rhonchi. He has no rales.   Abdominal: Soft. Bowel sounds are normal. He exhibits no distension. There is no tenderness. There is no rebound and no guarding.   Musculoskeletal: He exhibits edema (3+ pitting edema to lower extremities b/l up to above knees, as well as b/l hands).   Lymphadenopathy:     He has no cervical adenopathy.   Neurological: He is alert and oriented to person, place, and time.   Skin: Skin is warm and dry.         ED Course   Procedures  Labs Reviewed   CBC W/ AUTO DIFFERENTIAL - Abnormal; Notable for the following:        Result Value    RBC 3.37 (*)     Hemoglobin 10.7 (*)     Hematocrit 32.2 (*)     MCH 31.8 (*)     All other components within normal limits   COMPREHENSIVE METABOLIC PANEL - Abnormal; Notable for the following:     Albumin 2.9 (*)     Anion Gap 7 (*)     All other components within normal limits   TROPONIN I   B-TYPE NATRIURETIC PEPTIDE   PROTIME-INR   TSH   TSH    Narrative:     add on per Dr Hardy order #[id number] 10/01/2017  23:08  TSH      EKG Readings: (Independently Interpreted)   Normal sinus rhythm, rate 93, normal QRS axis, normal intervals, no ischemic ST changes.    Conchis reading: NSR at 86 bpm with baseline artifact. Nonspecific T wave changes.            X-Rays:   Independently Interpreted Readings:   Other Readings:  No acute cardiopulmonary process.    Medical Decision Making:   History:   Old Medical Records: I decided to obtain old medical records.  Independently Interpreted Test(s):   I have ordered and independently interpreted X-rays - see prior notes.  I have ordered and independently interpreted EKG Reading(s) - see prior notes  Clinical Tests:   Lab Tests: Ordered and Reviewed  Radiological Study: Ordered and Reviewed  Medical Tests: Ordered and Reviewed  Other:   I have discussed this case with another health care provider.       APC / Resident Notes:   HO-III MDM:  Ddx: Congestive heart failure, ACS, pickwickian hypoventilation, PE, malignancy, pneumonia, thyroid abnormality.    Heart failure workup in progress.  Lasix 40 mg IV.  Patient's blood pressure is within normal limits.    Dinesh Hardy, PGY3 9:41 PM 10/02/2017    CBC with mild anemia baseline.  Coags normal.  CMP with albumin 2.9, otherwise normal.  Troponin negative.  BNP 70.  TSH normal.    Edema likely due to patient being for sustain a wheelchair for the past month and not ambulating.  Placing patient in observation for continued diuresis, internal medicine recommends adding on ultrasound lower extremities venous.    Dinesh Hardy, PGY3 1:41 AM 10/02/2017           Scribe Attestation:   Scribe #1: I performed the above scribed service and the documentation accurately describes the services I performed. I attest to the accuracy of the note.  Comments: I, Dr. Danica Balderrama, personally performed the services described in this documentation. All medical record entries made by the scribe were at my direction and in my presence.  I have reviewed the chart  and agree that the record reflects my personal performance and is accurate and complete. Danica Balderrama MD.  4:34 AM 10/02/2017      Attending Attestation:   Physician Attestation Statement for Resident:  As the supervising MD   Physician Attestation Statement: I have personally seen and examined this patient.   I agree with the above history. -: This is an emergent evaluation of a 85 y.o. Male with a hx of orthostatic hypotension on midodrine presenting with SOB and bilateral leg edema for the past week. Will do labs, treat with lasix, continue to monitor.     Labs show no significant abnormality.  Pt symptoms likely secondary to dependent edema and deconditioning.  Pt will be placed in obs for cardiac/BP monitoring while diuresis.     As the supervising MD I agree with the above PE.    As the supervising MD I agree with the above treatment, course, plan, and disposition.  I have reviewed and agree with the residents interpretation of the following: lab data, x-rays and EKG.                    ED Course      Clinical Impression:   The primary encounter diagnosis was Lower extremity edema. Diagnoses of Dyspnea on exertion, Autonomic instability, and Bilateral lower extremity edema were also pertinent to this visit.    Disposition:   Disposition: Placed in Observation  Condition: Stable                        Danica Balderrama MD  10/02/17 0436

## 2017-10-02 NOTE — NURSING
Pt transferred to MSU from US per this RN and PCT.  No complaints of pain. VSS on room air.  Pt denied dizziness, c/o mild SOB.  SpO2 99% room air.  Oriented to room and unit.  Will continue to monitor pt.

## 2017-10-02 NOTE — ASSESSMENT & PLAN NOTE
- BNP 70, no echo on file.  CXR unremarkable.  - Patient received Lasix 40mg IV in ER.  Will start TID.  - Albumin 2.9.  If no improvement with Lasix, consider adding albumin.  - Will check echo.  BLE US to rule out DVTs, as patient reports decreased ambulation.  - Patient would likely benefit from compression stockings on discharge, considering h/o autonomic instability and BLE edema.  - Strict I&Os, daily weights, cardiac diet.  Elevate BLE.

## 2017-10-02 NOTE — PLAN OF CARE
Patient seen and examined. 85 year old male placed in observation for lower extremity edema and SOB. Started on IV lasix and TTE pending. Noted overnight to have Lower extremity U/S (+) for left popliteal DVT. Started on tx lovenox and warfarin x 3 months. Discussed options for anticoagulation with patient and concern related to falls at home (over 2 in last 6 months). Discussed need to bridge with lovenox and INR monitoring. Patient will need to follow up with coumadin clinic as well. Patient notes his breathing has improved but ongoing 2+ BLE edema. CM/SW spoke with wife who notes he was recently sent home from SNF last Thursday. She does not want patient to go back to SNF and is requesting hospital bed for home use. PT/OT evaluations pending.   Plan to continue IV diuresis and await TTE. Likely discharge in 1-2 day spending clinical improvement.     Temp:  [98 °F (36.7 °C)-98.5 °F (36.9 °C)] 98.3 °F (36.8 °C)  Pulse:  [76-92] 88  Resp:  [13-20] 18  SpO2:  [92 %-100 %] 95 %  BP: (113-157)/(56-74) 157/74    Ashlie Bush PA-C  [facility]  Staff: Dr. Escalante

## 2017-10-02 NOTE — PLAN OF CARE
10/02/17 1112   Discharge Assessment   Assessment Type Discharge Planning Assessment   Confirmed/corrected address and phone number on facesheet? Yes   Assessment information obtained from? Caregiver   Expected Length of Stay (days) 2   Communicated expected length of stay with patient/caregiver no   Prior to hospitilization cognitive status: Not Oriented to Time   Prior to hospitalization functional status: Assistive Equipment   Current cognitive status: Not Oriented to Time   Current Functional Status: Assistive Equipment   Lives With spouse   Able to Return to Prior Arrangements unable to determine at this time (comments)   Is patient able to care for self after discharge? Unable to determine at this time (comments)   Patient's perception of discharge disposition home or selfcare   Patient currently being followed by outpatient case management? No   Patient currently receives any other outside agency services? No   Equipment Currently Used at Home cane, straight;walker, standard;walker, rolling;shower chair;bath bench   Do you have any problems affording any of your prescribed medications? No   Is the patient taking medications as prescribed? yes   Does the patient have transportation home? Yes   Transportation Available car;family or friend will provide   Does the patient receive services at the Coumadin Clinic? No   Discharge Plan A Home;Home with family;Home Health   Discharge Plan B Home;Home with family;Home Health   Patient/Family In Agreement With Plan unable to assess

## 2017-10-02 NOTE — PLAN OF CARE
Problem: Physical Therapy Goal  Goal: Physical Therapy Goal  Goals to be met by: 10/12/17    Patient will increase functional independence with mobility by performin. Supine to sit with Stand-by Assistance  2. Sit to supine with Stand-by Assistance  3. Sit to stand transfer with Contact Guard Assistance w/ RW  4. Bed to chair transfer with Minimal Assistance using Rolling Walker  5. Gait  x 30 feet with Minimal Assistance using Rolling Walker.   6. Lower extremity exercise program x15 reps per handout, with supervision    Outcome: Ongoing (interventions implemented as appropriate)  PT Goals established following initial brianna Gil, PT  10/2/2017

## 2017-10-02 NOTE — PLAN OF CARE
WIFE MERT   WIFE IS REQUESTING THAT A HOSPITAL BED BE PROVIDED AND THAT HOME HEALTH CONTINUE  WIFE WAS UNABLE TO PROVIDE THE PCP NAME

## 2017-10-02 NOTE — ED TRIAGE NOTES
"C/o bilateral leg swelling x 2 yrs, states " the swelling is worse today. I can't put on my shoes." c/o SOB, no chest pain.   "

## 2017-10-02 NOTE — SUBJECTIVE & OBJECTIVE
History reviewed. No pertinent past medical history.    Past Surgical History:   Procedure Laterality Date    BACK SURGERY  1961       Review of patient's allergies indicates:  No Known Allergies    No current facility-administered medications on file prior to encounter.      Current Outpatient Prescriptions on File Prior to Encounter   Medication Sig    midodrine (PROAMATINE) 10 MG tablet Take 1 tablet (10 mg total) by mouth 3 (three) times daily.    fluticasone (FLONASE) 50 mcg/actuation nasal spray SPRAY 2 SPRAYS IN EACH NOSTRIL ONCE DAILY    ranitidine (ZANTAC) 300 MG tablet      Family History     Problem Relation (Age of Onset)    Cancer Father    No Known Problems Mother        Social History Main Topics    Smoking status: Former Smoker     Quit date: 8/25/1985    Smokeless tobacco: Never Used    Alcohol use 1.2 oz/week     2 Shots of liquor per week    Drug use: Unknown    Sexual activity: Yes     Partners: Female     Review of Systems   Constitutional: Positive for activity change. Negative for appetite change, chills, diaphoresis, fatigue, fever and unexpected weight change.   HENT: Negative for congestion, rhinorrhea, sore throat, trouble swallowing and voice change.    Eyes: Negative for visual disturbance.   Respiratory: Positive for shortness of breath. Negative for cough, choking, chest tightness and wheezing.    Cardiovascular: Positive for leg swelling. Negative for chest pain and palpitations.   Gastrointestinal: Negative for abdominal distention, abdominal pain, anal bleeding, blood in stool, constipation, diarrhea, nausea and vomiting.   Endocrine: Negative for cold intolerance, heat intolerance, polydipsia and polyuria.   Genitourinary: Negative for dysuria, flank pain, frequency, hematuria and urgency.   Musculoskeletal: Negative for arthralgias, back pain, joint swelling and myalgias.   Skin: Negative for color change and rash.   Neurological: Negative for dizziness, seizures,  syncope, facial asymmetry, speech difficulty, weakness, light-headedness, numbness and headaches.   Hematological: Negative for adenopathy. Does not bruise/bleed easily.   Psychiatric/Behavioral: Negative for agitation, confusion, hallucinations and suicidal ideas.     Objective:     Vital Signs (Most Recent):  Temp: 98.5 °F (36.9 °C) (10/01/17 2115)  Pulse: 77 (10/02/17 0146)  Resp: 17 (10/02/17 0146)  BP: 130/69 (10/02/17 0146)  SpO2: 98 % (10/02/17 0146) Vital Signs (24h Range):  Temp:  [98.5 °F (36.9 °C)] 98.5 °F (36.9 °C)  Pulse:  [76-85] 77  Resp:  [13-20] 17  SpO2:  [92 %-100 %] 98 %  BP: (113-148)/(56-70) 130/69     Weight: 102.5 kg (226 lb)  Body mass index is 32.43 kg/m².    Physical Exam   Constitutional: He is oriented to person, place, and time. He appears well-developed and well-nourished. No distress.   HENT:   Head: Normocephalic and atraumatic.   Eyes: Pupils are equal, round, and reactive to light.   Neck: Neck supple. Carotid bruit is not present. No thyromegaly present.   Cardiovascular: Normal rate and regular rhythm.  Exam reveals no gallop.    No murmur heard.  Pulmonary/Chest: Effort normal and breath sounds normal. No respiratory distress. He has no wheezes.   Abdominal: Bowel sounds are normal. He exhibits no distension. There is no splenomegaly or hepatomegaly. There is no tenderness.   Musculoskeletal: Normal range of motion. He exhibits edema (3+ BLE pitting edema).   Neurological: He is alert and oriented to person, place, and time. No cranial nerve deficit or sensory deficit.   Skin: Skin is warm and dry. No rash noted.   Psychiatric: He has a normal mood and affect. His behavior is normal.        Significant Labs:   CBC:   Recent Labs  Lab 10/01/17  2206   WBC 5.72   HGB 10.7*   HCT 32.2*        CMP:   Recent Labs  Lab 10/01/17  2206      K 4.4      CO2 25      BUN 22   CREATININE 1.1   CALCIUM 8.9   PROT 6.5   ALBUMIN 2.9*   BILITOT 0.8   ALKPHOS 91   AST 22    ALT 22   ANIONGAP 7*   EGFRNONAA >60.0     Cardiac Markers:   Recent Labs  Lab 10/01/17  2206   BNP 70     TSH:   Recent Labs  Lab 10/01/17  2206   TSH 2.222       Significant Imaging: I have reviewed all pertinent imaging results/findings within the past 24 hours.

## 2017-10-02 NOTE — ED NOTES
Patient identifiers for Tona Dey 85 y.o. male checked and correct.  Chief Complaint   Patient presents with    Leg Swelling     and mild sob      History reviewed. No pertinent past medical history.  Allergies reported: Review of patient's allergies indicates:  No Known Allergies    LOC: Patient is awake, alert, and aware of environment with an appropriate affect. Patient is oriented x 3 and speaking appropriately.  APPEARANCE: Patient resting comfortably and in no acute distress. Patient is clean and well groomed, patient's clothing is properly fastened.  SKIN: The skin is warm and dry. Patient has normal skin turgor and moist mucus membranes. Skin is intact; no bruising or breakdown noted.  MUSKULOSKELETAL: Patient is moving all extremities well, no obvious deformities noted. Pulses intact.   RESPIRATORY: Airway is open and patent. Respirations are spontaneous and non-labored with normal effort and rate, c/o increased SOB with walking, BBS=clear  CARDIAC: Patient has a normal rate and rhythm. Sinus on cardiac monitor, +4 peripheral edema noted to bilateral legs and hand, swelling noted to face.    ABDOMEN: No distention noted. Bowel sounds active in all 4 quadrants. Soft and non-tender upon palpation.  NEUROLOGICAL: Facial expression is symmetrical. Hand grasps are equal bilaterally. Normal sensation in all extremities when touched with finger.

## 2017-10-02 NOTE — PT/OT/SLP EVAL
Physical Therapy  Evaluation    Tona Dey   MRN: [id number]   Admitting Diagnosis: Lower extremity edema    PT Received On: 10/02/17  PT Start Time: 1306     PT Stop Time: 1330    PT Total Time (min): 24 min       Billable Minutes:  Evaluation 14 Min  and Therapeutic Activity 10 Min    Diagnosis: Lower extremity edema      History reviewed. No pertinent past medical history.   Past Surgical History:   Procedure Laterality Date    BACK SURGERY  1961       Referring physician: Marilu Arnett PA-C  Date referred to PT: 10/02/17    General Precautions: Standard, fall  Orthopedic Precautions: N/A   Braces:         Do you have any cultural, spiritual, Scientologist conflicts, given your current situation?: none stated    Patient History:  Lives With: spouse  Living Arrangements: house  Home Accessibility:  (no concerns)  Transportation Available: car, family or friend will provide  Living Environment Comment: Patient reports that he lives with his wife in a [facility], [facility]. Patient reports that his wife assists him with ADL's as needed.   Equipment Currently Used at Home: cane, straight, walker, standard, shower chair, bath bench  DME owned (not currently used): standard walker, single point cane, shower chair and transfer tub bench    Previous Level of Function:  Transfer Skills: needs device  ADL Skills: needs assist  Work/Leisure Activity: needs assist    Subjective:  Communicated with nurse prior to session.   Patient cooperative and agreeable to therapy session.   Chief Complaint: decreased functional mobility and gait instability  Patient goals: to get better    Pain/Comfort  Pain Rating 1: 0/10      Objective:   Patient found with: telemetry     Cognitive Exam:  Oriented to: Person, Place, Time and Situation    Follows Commands/attention: Follows two-step commands  Communication: clear/fluent  Safety awareness/insight to disability: intact    Physical Exam:  Postural examination/scapula alignment: Rounded  shoulder    Skin integrity: Visible skin intact  Edema: Mild BLE distal to knees    Sensation:   Intact    Upper Extremity Range of Motion:  Right Upper Extremity: WFL  Left Upper Extremity: WFL    Upper Extremity Strength:  Right Upper Extremity: WFL  Left Upper Extremity: WFL    Lower Extremity Range of Motion:  Right Lower Extremity: WFL  Left Lower Extremity: WFL    Lower Extremity Strength: (grossly 3+/5)  Right Lower Extremity: WFL  Left Lower Extremity: WFL     Fine motor coordination:  Intact    Gross motor coordination: Patient has difficulty sequencing steps during gait.    Functional Mobility:  Bed Mobility:  Rolling/Turning to Left: Contact guard assistance  Rolling/Turning Right: Contact guard assistance  Scooting/Bridging: Maximum Assistance  Supine to Sit: Contact Guard Assistance  Sit to Supine: Contact Guard Assistance    Transfers:  Sit <> Stand Assistance: Moderate Assistance  Sit <> Stand Assistive Device: Rolling Walker    Gait:   Gait Distance: 14' to chair  Assistance 1: Moderate assistance (verbal cues for walker management and attention to task)  Gait Assistive Device: Rolling walker  Gait Pattern: 3-point gait  Gait Deviation(s): increased time in double stance, decreased laura, decreased step length, decreased velocity of limb motion, decreased toe-to-floor clearance, decreased weight-shifting ability    -Patient has difficulty sequencing steps during gait and managing RW. Patient requires increased verbal and tactile cues for sequencing and attention to task.    Balance:   Static Sit: GOOD: Takes MODERATE challenges from all directions  Dynamic Sit: GOOD: Maintains balance through MODERATE excursions of active trunk movement  Static Stand: FAIR: Maintains without assist but unable to take challenges  Dynamic stand: FAIR: Needs CONTACT GUARD during gait    Therapeutic Activities and Exercises:  PT Reinier completed  Patient assisted w/ stand pivot transfer from bed<>chair    AM-PAC 6 CLICK  MOBILITY  How much help from another person does this patient currently need?   1 = Unable, Total/Dependent Assistance  2 = A lot, Maximum/Moderate Assistance  3 = A little, Minimum/Contact Guard/Supervision  4 = None, Modified Reeves/Independent    Turning over in bed (including adjusting bedclothes, sheets and blankets)?: 3  Sitting down on and standing up from a chair with arms (e.g., wheelchair, bedside commode, etc.): 2  Moving from lying on back to sitting on the side of the bed?: 3  Moving to and from a bed to a chair (including a wheelchair)?: 2  Need to walk in hospital room?: 2  Climbing 3-5 steps with a railing?: 1  Total Score: 13     AM-PAC Raw Score CMS G-Code Modifier Level of Impairment Assistance   6 % Total / Unable   7 - 9 CM 80 - 100% Maximal Assist   10 - 14 CL 60 - 80% Moderate Assist   15 - 19 CK 40 - 60% Moderate Assist   20 - 22 CJ 20 - 40% Minimal Assist   23 CI 1-20% SBA / CGA   24 CH 0% Independent/ Mod I     Patient left supine with all lines intact, call button in reach, bed alarm on, nurse notified and PCT present.    Assessment:   Tona eDy is a 85 y.o. male with a medical diagnosis of Lower extremity edema and presents with gait instability and decreased functional mobility. Patient has slow and labored movement and poor sequencing of gait and RW management. Patient was recently a SNF patient and I feel that patient will benefit from continued skilled PT services in the skilled nursing setting. Patient will benefit by improving functional independence to decrease caregvier burden. Patient reports having a fractured RUE. Patient's medical status is evolving and his eval complexity is mod.     Rehab identified problem list/impairments: Rehab identified problem list/impairments: impaired endurance, weakness, impaired self care skills, impaired functional mobilty, gait instability, impaired balance, decreased safety awareness, impaired cardiopulmonary response to  activity    Rehab potential is fair.    Activity tolerance: Fair    Discharge recommendations: Discharge Facility/Level Of Care Needs: nursing facility, skilled     Barriers to discharge: Barriers to Discharge: Decreased caregiver support    Equipment recommendations: Equipment Needed After Discharge: none     GOALS:    Physical Therapy Goals        Problem: Physical Therapy Goal    Goal Priority Disciplines Outcome Goal Variances Interventions   Physical Therapy Goal     PT/OT, PT Ongoing (interventions implemented as appropriate)     Description:  Goals to be met by: 10/12/17    Patient will increase functional independence with mobility by performin. Supine to sit with Stand-by Assistance  2. Sit to supine with Stand-by Assistance  3. Sit to stand transfer with Contact Guard Assistance w/ RW  4. Bed to chair transfer with Minimal Assistance using Rolling Walker  5. Gait  x 30 feet with Minimal Assistance using Rolling Walker.   6. Lower extremity exercise program x15 reps per handout, with supervision                      PLAN:    Patient to be seen 3 x/week to address the above listed problems via therapeutic activities, gait training, therapeutic exercises, neuromuscular re-education  Plan of Care expires: 17  Plan of Care reviewed with: patient          Jamal Gil, PT  10/02/2017

## 2017-10-02 NOTE — H&P
[facility] Hospital Medicine  History & Physical    Patient Name: Tona Dey  MRN: [id number]  Admission Date: 10/1/2017  Attending Physician: Danica Balderrama MD   Primary Care Provider: Primary Doctor [facility] Medicine Team: Networked reference to record PCT  Marilu Arnett PA-C     Patient information was obtained from patient, past medical records and ER records.     Subjective:     Principal Problem:Lower extremity edema    Chief Complaint:   Chief Complaint   Patient presents with    Leg Swelling     and mild sob         HPI: Patient is an 85 year old gentleman with a h/o autonomic instability.  Patient is a poor historian.  He presents with worsening SOB and BLE swelling.  Patient was admitted to a SNF until two days ago (secondary to shoulder injury), but states that he spent most of his admission there in a wheelchair.  He was previously able to walk about 100ft before getting SOB, but now has HENSON when walking across a room.  Denies abdominal distension, chest pain, dizziness, palpitations, fever/chills, N/V, pain to BLE, cough.    History reviewed. No pertinent past medical history.    Past Surgical History:   Procedure Laterality Date    BACK SURGERY  1961       Review of patient's allergies indicates:  No Known Allergies    No current facility-administered medications on file prior to encounter.      Current Outpatient Prescriptions on File Prior to Encounter   Medication Sig    midodrine (PROAMATINE) 10 MG tablet Take 1 tablet (10 mg total) by mouth 3 (three) times daily.    fluticasone (FLONASE) 50 mcg/actuation nasal spray SPRAY 2 SPRAYS IN EACH NOSTRIL ONCE DAILY    ranitidine (ZANTAC) 300 MG tablet      Family History     Problem Relation (Age of Onset)    Cancer Father    No Known Problems Mother        Social History Main Topics    Smoking status: Former Smoker     Quit date: 8/25/1985    Smokeless tobacco: Never Used    Alcohol use 1.2 oz/week     2 Shots of  liquor per week    Drug use: Unknown    Sexual activity: Yes     Partners: Female     Review of Systems   Constitutional: Positive for activity change. Negative for appetite change, chills, diaphoresis, fatigue, fever and unexpected weight change.   HENT: Negative for congestion, rhinorrhea, sore throat, trouble swallowing and voice change.    Eyes: Negative for visual disturbance.   Respiratory: Positive for shortness of breath. Negative for cough, choking, chest tightness and wheezing.    Cardiovascular: Positive for leg swelling. Negative for chest pain and palpitations.   Gastrointestinal: Negative for abdominal distention, abdominal pain, anal bleeding, blood in stool, constipation, diarrhea, nausea and vomiting.   Endocrine: Negative for cold intolerance, heat intolerance, polydipsia and polyuria.   Genitourinary: Negative for dysuria, flank pain, frequency, hematuria and urgency.   Musculoskeletal: Negative for arthralgias, back pain, joint swelling and myalgias.   Skin: Negative for color change and rash.   Neurological: Negative for dizziness, seizures, syncope, facial asymmetry, speech difficulty, weakness, light-headedness, numbness and headaches.   Hematological: Negative for adenopathy. Does not bruise/bleed easily.   Psychiatric/Behavioral: Negative for agitation, confusion, hallucinations and suicidal ideas.     Objective:     Vital Signs (Most Recent):  Temp: 98.5 °F (36.9 °C) (10/01/17 2115)  Pulse: 77 (10/02/17 0146)  Resp: 17 (10/02/17 0146)  BP: 130/69 (10/02/17 0146)  SpO2: 98 % (10/02/17 0146) Vital Signs (24h Range):  Temp:  [98.5 °F (36.9 °C)] 98.5 °F (36.9 °C)  Pulse:  [76-85] 77  Resp:  [13-20] 17  SpO2:  [92 %-100 %] 98 %  BP: (113-148)/(56-70) 130/69     Weight: 102.5 kg (226 lb)  Body mass index is 32.43 kg/m².    Physical Exam   Constitutional: He is oriented to person, place, and time. He appears well-developed and well-nourished. No distress.   HENT:   Head: Normocephalic and  atraumatic.   Eyes: Pupils are equal, round, and reactive to light.   Neck: Neck supple. Carotid bruit is not present. No thyromegaly present.   Cardiovascular: Normal rate and regular rhythm.  Exam reveals no gallop.    No murmur heard.  Pulmonary/Chest: Effort normal and breath sounds normal. No respiratory distress. He has no wheezes.   Abdominal: Bowel sounds are normal. He exhibits no distension. There is no splenomegaly or hepatomegaly. There is no tenderness.   Musculoskeletal: Normal range of motion. He exhibits edema (3+ BLE pitting edema).   Neurological: He is alert and oriented to person, place, and time. No cranial nerve deficit or sensory deficit.   Skin: Skin is warm and dry. No rash noted.   Psychiatric: He has a normal mood and affect. His behavior is normal.        Significant Labs:   CBC:   Recent Labs  Lab 10/01/17  2206   WBC 5.72   HGB 10.7*   HCT 32.2*        CMP:   Recent Labs  Lab 10/01/17  2206      K 4.4      CO2 25      BUN 22   CREATININE 1.1   CALCIUM 8.9   PROT 6.5   ALBUMIN 2.9*   BILITOT 0.8   ALKPHOS 91   AST 22   ALT 22   ANIONGAP 7*   EGFRNONAA >60.0     Cardiac Markers:   Recent Labs  Lab 10/01/17  2206   BNP 70     TSH:   Recent Labs  Lab 10/01/17  2206   TSH 2.222       Significant Imaging: I have reviewed all pertinent imaging results/findings within the past 24 hours.    Assessment/Plan:     * Lower extremity edema    - BNP 70, no echo on file.  CXR unremarkable.  - Patient received Lasix 40mg IV in ER.  Will start TID.  - Albumin 2.9.  If no improvement with Lasix, consider adding albumin.  - Will check echo.  BLE US to rule out DVTs, as patient reports decreased ambulation.  - Patient would likely benefit from compression stockings on discharge, considering h/o autonomic instability and BLE edema.  - Strict I&Os, daily weights, cardiac diet.  Elevate BLE.          Physical deconditioning    - Patient reports decreased ambulation while at SNF.  -  Will consult PT/OT, SW.  - Fall precautions, ambulate with assistance, incentive spirometry.          Autonomic instability    - Continue midodrine 10mg TID.            VTE Risk Mitigation         Ordered     Medium Risk of VTE  Once      10/02/17 0230     Place TIGRE hose  Until discontinued      10/02/17 0230     Place sequential compression device  Until discontinued      10/02/17 0230             Marilu Arnett PA-C  Department of Hospital Medicine   [facility] [address]

## 2017-10-02 NOTE — CONSULTS
PharmD Consult received for:  1.) Warfarin dosing (new start):   · Indication: DVT  · Current INR: 1.1  · 5 mg PO daily initiated with Lovenox bridge  · Patient needs enrollment in CC - as long as patient has either a Cardiologist or PCP in system, pt may be enrolled - please alert me if you need assistance with this.  · OK to continue current regimen  · INR daily  · Will follow      Thank you for the consult,    Elida Mary, Bakari  [id number]      **Note: Consults are reviewed Monday-Friday 7:30-4pm. The above recommendations are only suggested. The recommendations should be considered in conjunction with all patient factors.**

## 2017-10-02 NOTE — HPI
Patient is an 85 year old gentleman with a h/o autonomic instability.  Patient is a poor historian.  He presents with worsening SOB and BLE swelling.  Patient was admitted to a SNF until two days ago (secondary to shoulder injury), but states that he spent most of his admission there in a wheelchair.  He was previously able to walk about 100ft before getting SOB, but now has HENSON when walking across a room.  Denies abdominal distension, chest pain, dizziness, palpitations, fever/chills, N/V, pain to BLE, cough.

## 2017-10-03 PROBLEM — I82.432 ACUTE DEEP VEIN THROMBOSIS (DVT) OF POPLITEAL VEIN OF LEFT LOWER EXTREMITY: Status: ACTIVE | Noted: 2017-10-03

## 2017-10-03 LAB
ANION GAP SERPL CALC-SCNC: 8 MMOL/L
BASOPHILS # BLD AUTO: 0.02 K/UL
BASOPHILS NFR BLD: 0.3 %
BUN SERPL-MCNC: 14 MG/DL
CALCIUM SERPL-MCNC: 8.8 MG/DL
CHLORIDE SERPL-SCNC: 105 MMOL/L
CO2 SERPL-SCNC: 26 MMOL/L
CREAT SERPL-MCNC: 0.9 MG/DL
DIFFERENTIAL METHOD: ABNORMAL
EOSINOPHIL # BLD AUTO: 0.2 K/UL
EOSINOPHIL NFR BLD: 2.6 %
ERYTHROCYTE [DISTWIDTH] IN BLOOD BY AUTOMATED COUNT: 12.7 %
EST. GFR  (AFRICAN AMERICAN): >60 ML/MIN/1.73 M^2
EST. GFR  (NON AFRICAN AMERICAN): >60 ML/MIN/1.73 M^2
GLUCOSE SERPL-MCNC: 93 MG/DL
HCT VFR BLD AUTO: 32.2 %
HGB BLD-MCNC: 10.8 G/DL
INR PPP: 1.2
LYMPHOCYTES # BLD AUTO: 2.3 K/UL
LYMPHOCYTES NFR BLD: 40 %
MCH RBC QN AUTO: 31.9 PG
MCHC RBC AUTO-ENTMCNC: 33.5 G/DL
MCV RBC AUTO: 95 FL
MONOCYTES # BLD AUTO: 0.4 K/UL
MONOCYTES NFR BLD: 6.9 %
NEUTROPHILS # BLD AUTO: 2.8 K/UL
NEUTROPHILS NFR BLD: 49.2 %
PLATELET # BLD AUTO: 186 K/UL
PMV BLD AUTO: 9.6 FL
POTASSIUM SERPL-SCNC: 3.9 MMOL/L
PROTHROMBIN TIME: 12.1 SEC
RBC # BLD AUTO: 3.39 M/UL
SODIUM SERPL-SCNC: 139 MMOL/L
WBC # BLD AUTO: 5.78 K/UL

## 2017-10-03 PROCEDURE — 97530 THERAPEUTIC ACTIVITIES: CPT

## 2017-10-03 PROCEDURE — 25000003 PHARM REV CODE 250: Performed by: EMERGENCY MEDICINE

## 2017-10-03 PROCEDURE — 25000003 PHARM REV CODE 250: Performed by: PHYSICIAN ASSISTANT

## 2017-10-03 PROCEDURE — 85610 PROTHROMBIN TIME: CPT

## 2017-10-03 PROCEDURE — 63600175 PHARM REV CODE 636 W HCPCS: Performed by: PHYSICIAN ASSISTANT

## 2017-10-03 PROCEDURE — 99232 SBSQ HOSP IP/OBS MODERATE 35: CPT | Mod: ,,, | Performed by: PHYSICIAN ASSISTANT

## 2017-10-03 PROCEDURE — 11000001 HC ACUTE MED/SURG PRIVATE ROOM

## 2017-10-03 PROCEDURE — 85025 COMPLETE CBC W/AUTO DIFF WBC: CPT

## 2017-10-03 PROCEDURE — 97166 OT EVAL MOD COMPLEX 45 MIN: CPT

## 2017-10-03 PROCEDURE — 80048 BASIC METABOLIC PNL TOTAL CA: CPT

## 2017-10-03 PROCEDURE — 36415 COLL VENOUS BLD VENIPUNCTURE: CPT

## 2017-10-03 RX ORDER — HYDRALAZINE HYDROCHLORIDE 10 MG/1
10 TABLET, FILM COATED ORAL EVERY 6 HOURS PRN
Status: DISCONTINUED | OUTPATIENT
Start: 2017-10-03 | End: 2017-10-03

## 2017-10-03 RX ADMIN — MIDODRINE HYDROCHLORIDE 10 MG: 5 TABLET ORAL at 05:10

## 2017-10-03 RX ADMIN — ACETAMINOPHEN 650 MG: 325 TABLET ORAL at 05:10

## 2017-10-03 RX ADMIN — FUROSEMIDE 40 MG: 10 INJECTION, SOLUTION INTRAMUSCULAR; INTRAVENOUS at 09:10

## 2017-10-03 RX ADMIN — STANDARDIZED SENNA CONCENTRATE AND DOCUSATE SODIUM 1 TABLET: 8.6; 5 TABLET, FILM COATED ORAL at 09:10

## 2017-10-03 RX ADMIN — STANDARDIZED SENNA CONCENTRATE AND DOCUSATE SODIUM 1 TABLET: 8.6; 5 TABLET, FILM COATED ORAL at 08:10

## 2017-10-03 RX ADMIN — WARFARIN SODIUM 5 MG: 5 TABLET ORAL at 06:10

## 2017-10-03 RX ADMIN — MIDODRINE HYDROCHLORIDE 10 MG: 5 TABLET ORAL at 09:10

## 2017-10-03 RX ADMIN — ENOXAPARIN SODIUM 100 MG: 100 INJECTION SUBCUTANEOUS at 08:10

## 2017-10-03 RX ADMIN — ENOXAPARIN SODIUM 100 MG: 100 INJECTION SUBCUTANEOUS at 09:10

## 2017-10-03 RX ADMIN — MIDODRINE HYDROCHLORIDE 10 MG: 5 TABLET ORAL at 02:10

## 2017-10-03 NOTE — PLAN OF CARE
Attempted to call patient's spouse, Alem Dey, at [phone] and [phone] to verify patients primary care doctor.  No answer at either number.  Wife not at bedside.  Will attempt to speak with wife in am to verify PCP.  It appears that patient was followed by Dr Mora at [facility] in beginning of year.  Last appt was on 3/13/17.  Patient will need PCP or cardiology following for coumadin management.  Anticipate discharge 10/4/17, if medically stable.  Wife requesting hospital bed for discharge.

## 2017-10-03 NOTE — PLAN OF CARE
Per ANDI Marrero, pt is too weak to go home and will need SNF admission.  SW spoke w/wife who has requested referral to [facility] as pt has been there recently.      Adele Davison LMSW  [id number]

## 2017-10-03 NOTE — PLAN OF CARE
Problem: Occupational Therapy Goal  Goal: Occupational Therapy Goal  Goals to be met by: 1018/17     Patient will increase functional independence with ADLs by performing:    UE Dressing with Minimal Assistance.  Grooming while standing with Minimal Assistance.  Toileting from bedside commode with Minimal Assistance for hygiene and clothing management.   Supine to sit with Supervision.  Stand pivot transfers with Contact Guard Assistance.  Outcome: Ongoing (interventions implemented as appropriate)  OT eval completed. The above goals are established to improve functional (I) and mobility.    DANIS Beasley  10/3/2017  Pager: [phone]

## 2017-10-03 NOTE — CONSULTS
PharmD Consult received for:  1.) Warfarin dosing (new start):   · Indication: DVT  · Current INR: 1.2  · 5 mg PO daily initiated with Lovenox bridge  · Patient needs enrollment in CC - as long as patient has either a Cardiologist or PCP in system, pt may be enrolled - please alert me if you need assistance with this.  · OK to continue current regimen  · INR daily  · Will follow      Thank you for the consult,    Elida Mary, Bakari  [id number]      **Note: Consults are reviewed Monday-Friday 7:30-4pm. The above recommendations are only suggested. The recommendations should be considered in conjunction with all patient factors.**

## 2017-10-03 NOTE — ASSESSMENT & PLAN NOTE
- BNP 70, no echo on file.  CXR unremarkable.  - Patient received Lasix 40mg IV in ER  - Albumin 2.9.  If no improvement with Lasix, consider adding albumin.  - Will check echo.  BLE US to rule out DVTs, as patient reports decreased ambulation.  - Patient would likely benefit from compression stockings on discharge, considering h/o autonomic instability and BLE edema.  - Strict I&Os, daily weights, cardiac diet.  Elevate BLE.  - 2d shows pEF without wall motion abnormalities  10/3: BLE edema improved. Continue IV lasix 40 mg BID

## 2017-10-03 NOTE — HOSPITAL COURSE
Pt admitted to observation for LE edema. CXR unremarkable. BNP WNL. IV lasix 40 mg BID and swelling improved. TTE shows pEF without wall motion abnormalities. BLE US shows occlusive DVT within L popliteal. Coumadin/lovenox bridge started and lovenox d/c'd once INR at goal (2.0-3.0). Upgraded to inpatient for further management. PT/OT following [facility]. Pt discharged to [facility].

## 2017-10-03 NOTE — PROGRESS NOTES
[facility] Hospital Medicine  Progress Note    Patient Name: Tona Dey  MRN: [id number]  Patient Class: IP- Inpatient   Admission Date: 10/1/2017  Length of Stay: 1 days  Attending Physician: Aracelis Langston MD  Primary Care Provider: Primary Doctor [facility] Medicine Team: [facility] E Elida Denton PA-C    Subjective:     Principal Problem:Lower extremity edema    HPI:  Patient is an 85 year old gentleman with a h/o autonomic instability.  Patient is a poor historian.  He presents with worsening SOB and BLE swelling.  Patient was admitted to a SNF until two days ago (secondary to shoulder injury), but states that he spent most of his admission there in a wheelchair.  He was previously able to walk about 100ft before getting SOB, but now has HENSON when walking across a room.  Denies abdominal distension, chest pain, dizziness, palpitations, fever/chills, N/V, pain to BLE, cough.    Hospital Course:  Pt admitted to observation for LE edema. CXR unremarkable. BNP WNL. IV lasix 40 mg BID. 2d shows pEF without wall motion abnormalities. BLE US shows occlusive DVT within L popliteal. Coumadin/lovenox bridge started. PT/OT rec SNF. Referrals to be placed by SW.     Interval History: No acute events overnight. This AM, pt sitting upright in bed talking on phone. Pt agitated that call was disconnected. Discussed plan of care with patient and wife. Discussed therapy recs for SNF. Wife agreeable to SNF placement. SW to place referrals    Review of Systems   Constitutional: Positive for activity change. Negative for appetite change, chills, diaphoresis, fatigue, fever and unexpected weight change.   Respiratory: Negative for cough, choking, chest tightness, shortness of breath and wheezing.    Cardiovascular: Positive for leg swelling. Negative for chest pain and palpitations.   Gastrointestinal: Negative for abdominal distention, abdominal pain, constipation, diarrhea, nausea and vomiting.    Musculoskeletal: Positive for gait problem. Negative for arthralgias, back pain, joint swelling and myalgias.   Skin: Negative for color change and rash.   Neurological: Positive for weakness. Negative for dizziness, light-headedness and numbness.   Psychiatric/Behavioral: Positive for agitation. Negative for confusion, hallucinations and suicidal ideas.     Objective:     Vital Signs (Most Recent):  Temp: 98.8 °F (37.1 °C) (10/03/17 1649)  Pulse: 84 (10/03/17 1649)  Resp: 18 (10/03/17 1649)  BP: (!) 188/91 (10/03/17 1649)  SpO2: 97 % (10/03/17 1649) Vital Signs (24h Range):  Temp:  [97.6 °F (36.4 °C)-99.1 °F (37.3 °C)] 98.8 °F (37.1 °C)  Pulse:  [81-95] 84  Resp:  [16-18] 18  SpO2:  [92 %-97 %] 97 %  BP: (132-188)/(61-91) 188/91     Weight: 93 kg (205 lb)  Body mass index is 31.17 kg/m².    Intake/Output Summary (Last 24 hours) at 10/03/17 1700  Last data filed at 10/03/17 0600   Gross per 24 hour   Intake              190 ml   Output             1050 ml   Net             -860 ml      Physical Exam   Constitutional: He is oriented to person, place, and time. He appears well-developed and well-nourished. No distress.   Neck: Carotid bruit is not present. No thyromegaly present.   Cardiovascular: Normal rate and regular rhythm.    No murmur heard.  Pulmonary/Chest: Effort normal and breath sounds normal. No respiratory distress. He has no wheezes.   Abdominal: Soft. Bowel sounds are normal. He exhibits no distension. There is no splenomegaly or hepatomegaly. There is no tenderness.   Musculoskeletal: Normal range of motion. He exhibits edema (nonpitting edema to BLE; improved from 10/2).   Neurological: He is alert and oriented to person, place, and time. No cranial nerve deficit.   Skin: Skin is warm and dry. No rash noted. He is not diaphoretic. No erythema.   Psychiatric: He has a normal mood and affect. He is agitated.   Nursing note and vitals reviewed.      Significant Labs: All pertinent labs within the past  24 hours have been reviewed.    Significant Imaging: I have reviewed all pertinent imaging results/findings within the past 24 hours.    Assessment/Plan:      * Lower extremity edema    - BNP 70, no echo on file.  CXR unremarkable.  - Patient received Lasix 40mg IV in ER  - Albumin 2.9.  If no improvement with Lasix, consider adding albumin.  - Will check echo.  BLE US to rule out DVTs, as patient reports decreased ambulation.  - Patient would likely benefit from compression stockings on discharge, considering h/o autonomic instability and BLE edema.  - Strict I&Os, daily weights, cardiac diet.  Elevate BLE.  - 2d shows pEF without wall motion abnormalities  10/3: BLE edema improved. Continue IV lasix 40 mg BID        Acute deep vein thrombosis (DVT) of popliteal vein of left lower extremity    - BLE US to rule out DVTs as patient reports decreased ambulation  - DVT in L popliteal  - Discussed with wife and started coumadin/lovenox bridge  - INR daily        Physical deconditioning    - Patient reports decreased ambulation while at SNF.  - Will consult PT/OT, SW.  - Fall precautions, ambulate with assistance, incentive spirometry.  - PT/OT rec SNF  10/3: Wife agreeable to SNF. SW to place referrals          Autonomic instability    - Continue midodrine 10mg TID.            VTE Risk Mitigation         Ordered     warfarin (COUMADIN) tablet 5 mg  Daily     Route:  Oral        10/02/17 1300     enoxaparin injection 100 mg  Every 12 hours (non-standard times)     Route:  Subcutaneous        10/02/17 0839     Medium Risk of VTE  Once      10/02/17 0230     Place TIGRE hose  Until discontinued      10/02/17 0230     Place sequential compression device  Until discontinued      10/02/17 0230              Elida Denton PA-C  Department of Hospital Medicine   [facility]

## 2017-10-03 NOTE — NURSING
Uneventful night. Remains aaox 2 (self and place) Tele monitoring in place, NSR.  Pt voided 1050 ml of light yellow urine in urinal. Consumed 190 ml of H20. Current weight 205 lbs (Bed scale) Received Tylenol for complaints of stiffness and leg pain. No s/s of distress. Denies SOB. B/P 169/79. Bed in owest position, call bell in reach.

## 2017-10-03 NOTE — ASSESSMENT & PLAN NOTE
- Patient reports decreased ambulation while at SNF.  - Will consult PT/OT, SW.  - Fall precautions, ambulate with assistance, incentive spirometry.  - PT/OT rec SNF  10/3: Wife agreeable to SNF. SW to place referrals

## 2017-10-03 NOTE — PLAN OF CARE
Problem: Fluid Volume Excess (Adult,Obstetrics,Pediatric)  Goal: Identify Related Risk Factors and Signs and Symptoms  Related risk factors and signs and symptoms are identified upon initiation of Human Response Clinical Practice Guideline (CPG)  Outcome: Ongoing (interventions implemented as appropriate)   10/02/17 2025   Fluid Volume Excess   Related Risk Factors (Fluid Volume Excess) cardiac changes;medication effects;knowledge deficit;fluid intake excessive   Signs and Symptoms (Fluid Volume Excess) acute weight gain;appetite changes;blood pressure/heart rate changes;activity intolerance;edema;intake greater than output;lab value changes;mental status changes;pulmonary congestion/pleural effusion;respiratory pattern changes;restlessness;taut shiny skin     Patient is alert to self and requires orientation to place and time.  Patient presents with a dx of autonoic instability [ which causes a significant drop in Bp]  Patient is presently on lasix 40 mg IV BID, midodrine 10 mg po TID.  Patient voiding and is on strict I/O and monitoring weight for any changes.  Patient 2D ECHO revealed the following mitral valve regurgitation and US of BLE revealed a DVT to L popliteal.  Patient started on a course of coumadin 5 mg and PT/INR results 11.1/1.1.  Patient remains on telemetry and wife is at bedside.  Patient and wife are in agreement with present POC. Discharge date is  TBD.

## 2017-10-03 NOTE — PT/OT/SLP EVAL
"Occupational Therapy  Evaluation/ Treatment    Tona Dey   MRN: [id number]   Admitting Diagnosis: Lower extremity edema    OT Date of Treatment: 10/03/17   OT Start Time: 1235  OT Stop Time: 1256  OT Total Time (min): 21 min    Billable Minutes:  Evaluation 13 minutes  Therapeutic Activity 8 minutes    Diagnosis: Lower extremity edema   Decreased strength, endurance, balance, ROM; impaired cognition    History reviewed. No pertinent past medical history.   Past Surgical History:   Procedure Laterality Date    BACK SURGERY  1961       Referring physician: SULEIMAN Langston  Date referred to OT: 10/3/2017    General Precautions: Standard, fall    Do you have any cultural, spiritual, Adventism conflicts, given your current situation?: none stated     Patient History:  Living Environment  Lives With: spouse  Living Arrangements: house  Living Environment Comment: Pt lives with wife in a 1SH with no LAMBERT. Pt uses walker or cane for ambulation and reports his wife assists with ADLs.    Prior level of function:   Bed Mobility/Transfers: needs device  Grooming: needs assist  Bathing: needs assist  Upper Body Dressing: needs assist  Lower Body Dressing: needs assist  Toileting: needs assist  Home Management Skills: unable to perform  Homemaking Responsibilities: No     Dominant hand: right    Subjective:  Communicated with RN prior to session.  "I need to call my home and figure out what's going on there."  Chief Complaint: wants to call home  Patient/Family stated goals: go home    Pain/Comfort  Pain Rating 1: 0/10  Pain Rating Post-Intervention 1: 0/10    Objective:  Patient found with: telemetry, pt found supine in bed and agreeable to therapy.    Cognitive Exam:  Oriented to: Person and Situation  Follows Commands/attention: Follows two-step commands  Communication: dysarthria  Memory:  Difficult to assess, but does appear impaired  Safety awareness/insight to disability: impaired  Coping skills/emotional control: Appropriate " to situation    Visual/perceptual:  Intact    Physical Exam:  Postural examination/scapula alignment: Rounded shoulder  Skin integrity: Dry  Edema: Severe LEs    Sensation:   Intact per report, NT    Upper Extremity Range of Motion:  Right Upper Extremity: AROM of shoulder flexion/abduction ~90 degrees, ER and IR in horizontal abduction limited (cannot reach center of back of head or lower back)  Left Upper Extremity: same    Upper Extremity Strength:  Right Upper Extremity: 2/5  Left Upper Extremity: 2/5   Strength: Fair    Fine motor coordination:   Intact    Gross motor coordination: impaired LEs    Functional Mobility:  Bed Mobility:  Rolling/Turning to Left: Stand by assistance  Scooting/Bridging: Stand by Assistance (to EOB)  Supine to Sit: Stand by Assistance    Transfers:  Sit <> Stand Assistance: Minimum Assistance  Sit <> Stand Assistive Device: No Assistive Device  Bed <> Chair Technique: Stand Pivot  Bed <> Chair Transfer Assistance: Minimum Assistance  Bed <> Chair Assistive Device: No Assistive Device    Functional Ambulation: Min A stand pivot t/f with no AD, posterior lean present, unable to correct with verbal or tactile cues.    Activities of Daily Living:    LE Dressing Level of Assistance: Total assistance (socks)  Toileting Where Assessed: Bed level  Toileting Level of Assistance: Total assistance (RN placed urinal between pt's legs. pt urinated in urinal, and did not remove it and was not aware of it when asked.)    Balance:   Static Sit: GOOD+: Takes MAXIMAL challenges from all directions.    Dynamic Sit: FAIR+: Maintains balance through MINIMAL excursions of active trunk motion  Static Stand: POOR+: Needs MINIMAL assist to maintain  Dynamic stand: POOR: N/A    Therapeutic Activities and Exercises:  Pt ed re OT role and POC. Pt performed supine to sit with SBA. Pt performed strength and ROM testing. Pt required Total A to don socks. Pt performed stand pivot t/f with Min A and no AD from  "EOB to recliner. Cognitively, pt requires a varying amount of assistance for toileiting/use of urinal.  Pt ed re ankle pumps, use of urinal, and safety.    AM-PAC 6 CLICK ADL  How much help from another person does this patient currently need?  1 = Unable, Total/Dependent Assistance  2 = A lot, Maximum/Moderate Assistance  3 = A little, Minimum/Contact Guard/Supervision  4 = None, Modified Leslie/Independent    Putting on and taking off regular lower body clothing? : 2  Bathing (including washing, rinsing, drying)?: 2  Toileting, which includes using toilet, bedpan, or urinal? : 2  Putting on and taking off regular upper body clothing?: 2  Taking care of personal grooming such as brushing teeth?: 3  Eating meals?: 3  Total Score: 14    AM-PAC Raw Score CMS "G-Code Modifier Level of Impairment Assistance   6 % Total / Unable   7 - 9 CM 80 - 100% Maximal Assist   10-14 CL 60 - 80% Moderate Assist   15 - 19 CK 40 - 60% Moderate Assist   20 - 22 CJ 20 - 40% Minimal Assist   23 CI 1-20% SBA / CGA   24 CH 0% Independent/ Mod I       Patient left up in chair with all lines intact and call button in reach    Assessment:  Tona Dey is a 85 y.o. male with a medical diagnosis of Lower extremity edema and presents with the impairments listed below. Pt is pleasant and participates well, but is not oriented to place or time. Pt requiring increased assistance for mobility at this time, and increased assistance for ADLs as compared to baseline. Pt would benefit from cont OT to improve performance of ADLs and to improve strength, endurance and balance for safe mobility.    Pt evaluation falls under moderate complexity for evaluation coding due to identification of 3-5 performance deficits noted as stated above. Eval required Min/Mod assistance to complete on this date and detailed assessment(s) were utilized. Moreover, an expanded review of history and occupational profile obtained with additional review of " cognitive, physical and psychosocial hx.       Rehab identified problem list/impairments: Rehab identified problem list/impairments: weakness, impaired endurance, impaired self care skills, impaired functional mobilty, decreased coordination, impaired cognition, impaired balance, gait instability, decreased upper extremity function, decreased lower extremity function, decreased ROM, impaired skin, edema, decreased safety awareness    Rehab potential is good.    Activity tolerance: Fair    Discharge recommendations: Discharge Facility/Level Of Care Needs: nursing facility, skilled     Barriers to discharge: Barriers to Discharge: Decreased caregiver support    Equipment recommendations: none     GOALS:    Occupational Therapy Goals        Problem: Occupational Therapy Goal    Goal Priority Disciplines Outcome Interventions   Occupational Therapy Goal     OT, PT/OT Ongoing (interventions implemented as appropriate)    Description:  Goals to be met by: 1018/17     Patient will increase functional independence with ADLs by performing:    UE Dressing with Minimal Assistance.  Grooming while standing with Minimal Assistance.  Toileting from bedside commode with Minimal Assistance for hygiene and clothing management.   Supine to sit with Supervision.  Stand pivot transfers with Contact Guard Assistance.                    PLAN:  Patient to be seen 3 x/week to address the above listed problems via self-care/home management, therapeutic activities, therapeutic exercises, neuromuscular re-education, cognitive retraining  Plan of Care expires: 11/03/17  Plan of Care reviewed with: patient    DANIS Beasley  10/3/2017  Pager: [phone]

## 2017-10-03 NOTE — SUBJECTIVE & OBJECTIVE
Interval History: No acute events overnight. This AM, pt sitting upright in bed talking on phone. Pt agitated that call was disconnected. Discussed plan of care with patient and wife. Discussed therapy recs for SNF. Wife agreeable to SNF placement. SW to place referrals    Review of Systems   Constitutional: Positive for activity change. Negative for appetite change, chills, diaphoresis, fatigue, fever and unexpected weight change.   Respiratory: Negative for cough, choking, chest tightness, shortness of breath and wheezing.    Cardiovascular: Positive for leg swelling. Negative for chest pain and palpitations.   Gastrointestinal: Negative for abdominal distention, abdominal pain, constipation, diarrhea, nausea and vomiting.   Musculoskeletal: Positive for gait problem. Negative for arthralgias, back pain, joint swelling and myalgias.   Skin: Negative for color change and rash.   Neurological: Positive for weakness. Negative for dizziness, light-headedness and numbness.   Psychiatric/Behavioral: Positive for agitation. Negative for confusion, hallucinations and suicidal ideas.     Objective:     Vital Signs (Most Recent):  Temp: 98.8 °F (37.1 °C) (10/03/17 1649)  Pulse: 84 (10/03/17 1649)  Resp: 18 (10/03/17 1649)  BP: (!) 188/91 (10/03/17 1649)  SpO2: 97 % (10/03/17 1649) Vital Signs (24h Range):  Temp:  [97.6 °F (36.4 °C)-99.1 °F (37.3 °C)] 98.8 °F (37.1 °C)  Pulse:  [81-95] 84  Resp:  [16-18] 18  SpO2:  [92 %-97 %] 97 %  BP: (132-188)/(61-91) 188/91     Weight: 93 kg (205 lb)  Body mass index is 31.17 kg/m².    Intake/Output Summary (Last 24 hours) at 10/03/17 1700  Last data filed at 10/03/17 0600   Gross per 24 hour   Intake              190 ml   Output             1050 ml   Net             -860 ml      Physical Exam   Constitutional: He is oriented to person, place, and time. He appears well-developed and well-nourished. No distress.   Neck: Carotid bruit is not present. No thyromegaly present.    Cardiovascular: Normal rate and regular rhythm.    No murmur heard.  Pulmonary/Chest: Effort normal and breath sounds normal. No respiratory distress. He has no wheezes.   Abdominal: Soft. Bowel sounds are normal. He exhibits no distension. There is no splenomegaly or hepatomegaly. There is no tenderness.   Musculoskeletal: Normal range of motion. He exhibits edema (nonpitting edema to BLE; improved from 10/2).   Neurological: He is alert and oriented to person, place, and time. No cranial nerve deficit.   Skin: Skin is warm and dry. No rash noted. He is not diaphoretic. No erythema.   Psychiatric: He has a normal mood and affect. He is agitated.   Nursing note and vitals reviewed.      Significant Labs: All pertinent labs within the past 24 hours have been reviewed.    Significant Imaging: I have reviewed all pertinent imaging results/findings within the past 24 hours.

## 2017-10-03 NOTE — ASSESSMENT & PLAN NOTE
- BLE US to rule out DVTs as patient reports decreased ambulation  - DVT in L popliteal  - Discussed with wife and started coumadin/lovenox bridge  - INR daily

## 2017-10-04 LAB
ANION GAP SERPL CALC-SCNC: 8 MMOL/L
BASOPHILS # BLD AUTO: 0.03 K/UL
BASOPHILS NFR BLD: 0.6 %
BUN SERPL-MCNC: 15 MG/DL
CALCIUM SERPL-MCNC: 8.9 MG/DL
CHLORIDE SERPL-SCNC: 105 MMOL/L
CO2 SERPL-SCNC: 26 MMOL/L
CREAT SERPL-MCNC: 1 MG/DL
DIFFERENTIAL METHOD: ABNORMAL
EOSINOPHIL # BLD AUTO: 0.2 K/UL
EOSINOPHIL NFR BLD: 3.5 %
ERYTHROCYTE [DISTWIDTH] IN BLOOD BY AUTOMATED COUNT: 13 %
EST. GFR  (AFRICAN AMERICAN): >60 ML/MIN/1.73 M^2
EST. GFR  (NON AFRICAN AMERICAN): >60 ML/MIN/1.73 M^2
GLUCOSE SERPL-MCNC: 84 MG/DL
HCT VFR BLD AUTO: 33.1 %
HGB BLD-MCNC: 10.9 G/DL
INR PPP: 1.1
LYMPHOCYTES # BLD AUTO: 2.4 K/UL
LYMPHOCYTES NFR BLD: 44.8 %
MCH RBC QN AUTO: 31.7 PG
MCHC RBC AUTO-ENTMCNC: 32.9 G/DL
MCV RBC AUTO: 96 FL
MONOCYTES # BLD AUTO: 0.4 K/UL
MONOCYTES NFR BLD: 6.4 %
NEUTROPHILS # BLD AUTO: 2.4 K/UL
NEUTROPHILS NFR BLD: 43.6 %
PLATELET # BLD AUTO: 185 K/UL
PMV BLD AUTO: 9.7 FL
POTASSIUM SERPL-SCNC: 3.9 MMOL/L
PROTHROMBIN TIME: 11.7 SEC
RBC # BLD AUTO: 3.44 M/UL
SODIUM SERPL-SCNC: 139 MMOL/L
WBC # BLD AUTO: 5.45 K/UL

## 2017-10-04 PROCEDURE — 63600175 PHARM REV CODE 636 W HCPCS: Performed by: PHYSICIAN ASSISTANT

## 2017-10-04 PROCEDURE — 85610 PROTHROMBIN TIME: CPT

## 2017-10-04 PROCEDURE — 86580 TB INTRADERMAL TEST: CPT | Performed by: PHYSICIAN ASSISTANT

## 2017-10-04 PROCEDURE — 11000001 HC ACUTE MED/SURG PRIVATE ROOM

## 2017-10-04 PROCEDURE — 80048 BASIC METABOLIC PNL TOTAL CA: CPT

## 2017-10-04 PROCEDURE — 25000003 PHARM REV CODE 250: Performed by: PHYSICIAN ASSISTANT

## 2017-10-04 PROCEDURE — 99232 SBSQ HOSP IP/OBS MODERATE 35: CPT | Mod: ,,, | Performed by: PHYSICIAN ASSISTANT

## 2017-10-04 PROCEDURE — 36415 COLL VENOUS BLD VENIPUNCTURE: CPT

## 2017-10-04 PROCEDURE — 85025 COMPLETE CBC W/AUTO DIFF WBC: CPT

## 2017-10-04 PROCEDURE — 25000003 PHARM REV CODE 250: Performed by: EMERGENCY MEDICINE

## 2017-10-04 RX ADMIN — ENOXAPARIN SODIUM 100 MG: 100 INJECTION SUBCUTANEOUS at 09:10

## 2017-10-04 RX ADMIN — MIDODRINE HYDROCHLORIDE 10 MG: 5 TABLET ORAL at 09:10

## 2017-10-04 RX ADMIN — Medication 5 UNITS: at 03:10

## 2017-10-04 RX ADMIN — STANDARDIZED SENNA CONCENTRATE AND DOCUSATE SODIUM 1 TABLET: 8.6; 5 TABLET, FILM COATED ORAL at 09:10

## 2017-10-04 RX ADMIN — WARFARIN SODIUM 5 MG: 5 TABLET ORAL at 05:10

## 2017-10-04 RX ADMIN — TRAMADOL HYDROCHLORIDE 50 MG: 50 TABLET, FILM COATED ORAL at 10:10

## 2017-10-04 RX ADMIN — FUROSEMIDE 40 MG: 10 INJECTION, SOLUTION INTRAMUSCULAR; INTRAVENOUS at 09:10

## 2017-10-04 NOTE — PLAN OF CARE
Met with wife Marsha Dey at pt's bedside. Spent time with wife attempting to explain Coumadin and monitoring required upon d/c. Medical review at [facility] in progress. If accepted, coumadin monitoring can be done at NH. Upon d/c pt's PCP and coumadin clinic can follow. Pt's PCP Dr. Kendra Mcginnis @ [phone]. Spoke with Laurie ESCAMILLA and we discussed the above. SW/CM will follow. SILKE Cleaning RN/CM

## 2017-10-04 NOTE — PLAN OF CARE
SW spoke w/Dian, admission coordinator for [facility], who states that they have received referral and it is under review.  Pt is in co-pay days as pt has used 20 skilled days.      Adele Davison LMSW  [id number]

## 2017-10-04 NOTE — PROGRESS NOTES
[facility] Hospital Medicine  Progress Note    Patient Name: Tona Dey  MRN: [id number]  Patient Class: IP- Inpatient   Admission Date: 10/1/2017  Length of Stay: 2 days  Attending Physician: Aracelis Langston MD  Primary Care Provider: Primary Doctor [facility] Medicine Team: [facility] Elida Denton PA-C    Subjective:     Principal Problem:Lower extremity edema    HPI:  Patient is an 85 year old gentleman with a h/o autonomic instability.  Patient is a poor historian.  He presents with worsening SOB and BLE swelling.  Patient was admitted to a SNF until two days ago (secondary to shoulder injury), but states that he spent most of his admission there in a wheelchair.  He was previously able to walk about 100ft before getting SOB, but now has HENSON when walking across a room.  Denies abdominal distension, chest pain, dizziness, palpitations, fever/chills, N/V, pain to BLE, cough.    Hospital Course:  Pt admitted to observation for LE edema. CXR unremarkable. BNP WNL. IV lasix 40 mg BID. 2d shows pEF without wall motion abnormalities. BLE US shows occlusive DVT within L popliteal. Coumadin/lovenox bridge started. PT/OT rec SNF. Referrals to be placed by . Discharged pending SNF.    Interval History: No acute events overnight. This AM, pt sitting upright in bed with wife and friends at bedside. Pt has no complaints at this time. Discussed plan of care including discharge pending SNF.    Review of Systems   Constitutional: Positive for activity change. Negative for appetite change, chills, diaphoresis, fatigue, fever and unexpected weight change.   Respiratory: Negative for cough, choking, chest tightness, shortness of breath and wheezing.    Cardiovascular: Positive for leg swelling. Negative for chest pain and palpitations.   Gastrointestinal: Negative for abdominal distention, abdominal pain, constipation, diarrhea, nausea and vomiting.   Musculoskeletal: Positive for gait problem.  Negative for arthralgias, back pain, joint swelling and myalgias.   Neurological: Positive for weakness. Negative for dizziness, light-headedness and numbness.   Psychiatric/Behavioral: Negative for agitation, confusion, hallucinations and suicidal ideas.     Objective:     Vital Signs (Most Recent):  Temp: 96.5 °F (35.8 °C) (10/04/17 1121)  Pulse: 76 (10/04/17 1419)  Resp: 18 (10/04/17 1419)  BP: 126/66 (10/04/17 1419)  SpO2: 95 % (10/04/17 1419) Vital Signs (24h Range):  Temp:  [96.5 °F (35.8 °C)-98.8 °F (37.1 °C)] 96.5 °F (35.8 °C)  Pulse:  [62-90] 76  Resp:  [16-20] 18  SpO2:  [93 %-100 %] 95 %  BP: (102-188)/(62-91) 126/66     Weight: 93 kg (205 lb)  Body mass index is 31.17 kg/m².    Intake/Output Summary (Last 24 hours) at 10/04/17 1501  Last data filed at 10/04/17 1242   Gross per 24 hour   Intake              940 ml   Output                0 ml   Net              940 ml      Physical Exam   Constitutional: He is oriented to person, place, and time. He appears well-developed and well-nourished. No distress.   Neck: Carotid bruit is not present.   Cardiovascular: Normal rate and regular rhythm.    No murmur heard.  Pulmonary/Chest: Effort normal and breath sounds normal. No respiratory distress. He has no wheezes.   Abdominal: Soft. Bowel sounds are normal. He exhibits no distension. There is no splenomegaly or hepatomegaly. There is no tenderness.   Musculoskeletal: Normal range of motion. He exhibits edema (nonpitting edema to BLE; improved from 10/3).   Neurological: He is alert and oriented to person, place, and time. No cranial nerve deficit.   Skin: Skin is warm and dry. No rash noted. He is not diaphoretic. No erythema.   Psychiatric: He has a normal mood and affect. His behavior is normal.   Nursing note and vitals reviewed.      Significant Labs: All pertinent labs within the past 24 hours have been reviewed.    Significant Imaging: I have reviewed all pertinent imaging results/findings within the  past 24 hours.    Assessment/Plan:      * Lower extremity edema    - BNP 70, no echo on file.  CXR unremarkable.  - Patient received Lasix 40mg IV in ER  - Albumin 2.9.  If no improvement with Lasix, consider adding albumin.  - Will check echo.  BLE US to rule out DVTs, as patient reports decreased ambulation.  - Patient would likely benefit from compression stockings on discharge, considering h/o autonomic instability and BLE edema.  - Strict I&Os, daily weights, cardiac diet.  Elevate BLE.  - 2d shows pEF without wall motion abnormalities  10/3-10/4: BLE edema improved. Continue IV lasix 40 mg BID        Acute deep vein thrombosis (DVT) of popliteal vein of left lower extremity    - BLE US to rule out DVTs as patient reports decreased ambulation  - DVT in L popliteal  - Discussed with wife and started coumadin/lovenox bridge  - INR daily; pharm D following  - Will need follow up with PCP/coumadin clinic        Physical deconditioning    - Patient reports decreased ambulation while at SNF.  - Will consult PT/OT, SW.  - Fall precautions, ambulate with assistance, incentive spirometry.  - PT/OT rec SNF  10/3: Wife agreeable to SNF. SW to place referrals          Autonomic instability    - Continue midodrine 10mg TID.            VTE Risk Mitigation         Ordered     warfarin (COUMADIN) tablet 5 mg  Daily     Route:  Oral        10/02/17 1300     enoxaparin injection 100 mg  Every 12 hours (non-standard times)     Route:  Subcutaneous        10/02/17 0839     Medium Risk of VTE  Once      10/02/17 0230     Place TIGRE hose  Until discontinued      10/02/17 0230     Place sequential compression device  Until discontinued      10/02/17 0230              Elida Denton PA-C  Department of Hospital Medicine   [facility]

## 2017-10-04 NOTE — PT/OT/SLP PROGRESS
Physical Therapy      Tona Dey  MRN: [id number]    Patient not seen today secondary to pt being seen by med team first attempt, and pt on hold per nursing second attempt.  Will follow-up when appropriate. .    Nadeen Bowman, PTA

## 2017-10-04 NOTE — ASSESSMENT & PLAN NOTE
- BNP 70, no echo on file.  CXR unremarkable.  - Patient received Lasix 40mg IV in ER  - Albumin 2.9.  If no improvement with Lasix, consider adding albumin.  - Will check echo.  BLE US to rule out DVTs, as patient reports decreased ambulation.  - Patient would likely benefit from compression stockings on discharge, considering h/o autonomic instability and BLE edema.  - Strict I&Os, daily weights, cardiac diet.  Elevate BLE.  - 2d shows pEF without wall motion abnormalities  10/3-10/4: BLE edema improved. Continue IV lasix 40 mg BID

## 2017-10-04 NOTE — CONSULTS
PharmD Consult received for:  1.) Warfarin dosing (new start):   · Indication: DVT  · Current INR: 1.1  · 5 mg PO daily initiated with Lovenox bridge  · Patient needs enrollment in CC - as long as patient has either a Cardiologist or PCP in system, pt may be enrolled - please alert me if you need assistance with this.  · OK to continue current regimen - give 1 more night of 5 mg tonight, then if still no movement by tomorrow, will likely increase dose.  · INR daily  · Will follow      Thank you for the consult,    Elida Mary, PharmD  [id number]      **Note: Consults are reviewed Monday-Friday 7:30-4pm. The above recommendations are only suggested. The recommendations should be considered in conjunction with all patient factors.**

## 2017-10-04 NOTE — SUBJECTIVE & OBJECTIVE
Interval History: No acute events overnight. This AM, pt sitting upright in bed with wife and friends at bedside. Pt has no complaints at this time. Discussed plan of care including discharge pending SNF.    Review of Systems   Constitutional: Positive for activity change. Negative for appetite change, chills, diaphoresis, fatigue, fever and unexpected weight change.   Respiratory: Negative for cough, choking, chest tightness, shortness of breath and wheezing.    Cardiovascular: Positive for leg swelling. Negative for chest pain and palpitations.   Gastrointestinal: Negative for abdominal distention, abdominal pain, constipation, diarrhea, nausea and vomiting.   Musculoskeletal: Positive for gait problem. Negative for arthralgias, back pain, joint swelling and myalgias.   Neurological: Positive for weakness. Negative for dizziness, light-headedness and numbness.   Psychiatric/Behavioral: Negative for agitation, confusion, hallucinations and suicidal ideas.     Objective:     Vital Signs (Most Recent):  Temp: 96.5 °F (35.8 °C) (10/04/17 1121)  Pulse: 76 (10/04/17 1419)  Resp: 18 (10/04/17 1419)  BP: 126/66 (10/04/17 1419)  SpO2: 95 % (10/04/17 1419) Vital Signs (24h Range):  Temp:  [96.5 °F (35.8 °C)-98.8 °F (37.1 °C)] 96.5 °F (35.8 °C)  Pulse:  [62-90] 76  Resp:  [16-20] 18  SpO2:  [93 %-100 %] 95 %  BP: (102-188)/(62-91) 126/66     Weight: 93 kg (205 lb)  Body mass index is 31.17 kg/m².    Intake/Output Summary (Last 24 hours) at 10/04/17 1501  Last data filed at 10/04/17 1242   Gross per 24 hour   Intake              940 ml   Output                0 ml   Net              940 ml      Physical Exam   Constitutional: He is oriented to person, place, and time. He appears well-developed and well-nourished. No distress.   Neck: Carotid bruit is not present.   Cardiovascular: Normal rate and regular rhythm.    No murmur heard.  Pulmonary/Chest: Effort normal and breath sounds normal. No respiratory distress. He has no  wheezes.   Abdominal: Soft. Bowel sounds are normal. He exhibits no distension. There is no splenomegaly or hepatomegaly. There is no tenderness.   Musculoskeletal: Normal range of motion. He exhibits edema (nonpitting edema to BLE; improved from 10/3).   Neurological: He is alert and oriented to person, place, and time. No cranial nerve deficit.   Skin: Skin is warm and dry. No rash noted. He is not diaphoretic. No erythema.   Psychiatric: He has a normal mood and affect. His behavior is normal.   Nursing note and vitals reviewed.      Significant Labs: All pertinent labs within the past 24 hours have been reviewed.    Significant Imaging: I have reviewed all pertinent imaging results/findings within the past 24 hours.

## 2017-10-04 NOTE — ASSESSMENT & PLAN NOTE
- BLE US to rule out DVTs as patient reports decreased ambulation  - DVT in L popliteal  - Discussed with wife and started coumadin/lovenox bridge  - INR daily; pharm D following  - Will need follow up with PCP/coumadin clinic

## 2017-10-04 NOTE — NURSING
Patient received tuberculin 0.1ml intradermally to R  Forearm, patient tolerated well. Results to be read in 48-72 hours.

## 2017-10-04 NOTE — PLAN OF CARE
Problem: Fall Risk (Adult)  Intervention: Review Medications/Identify Contributors to Fall Risk   10/04/17 4269   Safety Interventions   Medication Review/Management medications reviewed;high risk medications identified     Patient alert and responds to simple answers. Patient c/o pain in R elbow pain.  Rated pain a 7/10.  Medicated with prn tramadol 50 mg.  Patient /78, asymptomatic. No c/o blurred vision, or headache.  Patient appetite is good, Patient requires extensive assist with ADLs, transfers, and encouragement for use of spirometer at bedside.  No acute distress noted, discharge to a rehab and date is TBD.  Wife is at bedside and agrees with current POC.

## 2017-10-04 NOTE — PLAN OF CARE
Per Dian, admission coordinator for [facility], pt has been denied unable to meet needs.    Adele Davison, DAX  [phone]

## 2017-10-05 PROBLEM — I82.432 ACUTE DEEP VEIN THROMBOSIS (DVT) OF POPLITEAL VEIN OF LEFT LOWER EXTREMITY: Status: ACTIVE | Noted: 2017-10-05

## 2017-10-05 LAB
INR PPP: 1.2
PROTHROMBIN TIME: 12.5 SEC

## 2017-10-05 PROCEDURE — 11000001 HC ACUTE MED/SURG PRIVATE ROOM

## 2017-10-05 PROCEDURE — 99232 SBSQ HOSP IP/OBS MODERATE 35: CPT | Mod: ,,, | Performed by: PHYSICIAN ASSISTANT

## 2017-10-05 PROCEDURE — 94799 UNLISTED PULMONARY SVC/PX: CPT

## 2017-10-05 PROCEDURE — 36415 COLL VENOUS BLD VENIPUNCTURE: CPT

## 2017-10-05 PROCEDURE — 25000003 PHARM REV CODE 250: Performed by: EMERGENCY MEDICINE

## 2017-10-05 PROCEDURE — 63600175 PHARM REV CODE 636 W HCPCS: Performed by: PHYSICIAN ASSISTANT

## 2017-10-05 PROCEDURE — 25000003 PHARM REV CODE 250: Performed by: PHYSICIAN ASSISTANT

## 2017-10-05 PROCEDURE — 85610 PROTHROMBIN TIME: CPT

## 2017-10-05 RX ORDER — WARFARIN 7.5 MG/1
7.5 TABLET ORAL DAILY
Status: DISCONTINUED | OUTPATIENT
Start: 2017-10-05 | End: 2017-10-09

## 2017-10-05 RX ADMIN — STANDARDIZED SENNA CONCENTRATE AND DOCUSATE SODIUM 1 TABLET: 8.6; 5 TABLET, FILM COATED ORAL at 09:10

## 2017-10-05 RX ADMIN — TRAMADOL HYDROCHLORIDE 50 MG: 50 TABLET, FILM COATED ORAL at 06:10

## 2017-10-05 RX ADMIN — ENOXAPARIN SODIUM 100 MG: 100 INJECTION SUBCUTANEOUS at 09:10

## 2017-10-05 RX ADMIN — WARFARIN SODIUM 7.5 MG: 2.5 TABLET ORAL at 04:10

## 2017-10-05 RX ADMIN — MIDODRINE HYDROCHLORIDE 10 MG: 5 TABLET ORAL at 05:10

## 2017-10-05 NOTE — PROGRESS NOTES
[facility] Hospital Medicine  Progress Note    Patient Name: Tona Dey  MRN: [id number]  Patient Class: IP- Inpatient   Admission Date: 10/1/2017  Length of Stay: 3 days  Attending Physician: Aracelis Langtson MD  Primary Care Provider: Primary Doctor [facility] Medicine Team: [facility] E Elida Denton PA-C    Subjective:     Principal Problem:Acute deep vein thrombosis (DVT) of popliteal vein of left lower extremity    HPI:  Patient is an 85 year old gentleman with a h/o autonomic instability.  Patient is a poor historian.  He presents with worsening SOB and BLE swelling.  Patient was admitted to a SNF until two days ago (secondary to shoulder injury), but states that he spent most of his admission there in a wheelchair.  He was previously able to walk about 100ft before getting SOB, but now has HENSON when walking across a room.  Denies abdominal distension, chest pain, dizziness, palpitations, fever/chills, N/V, pain to BLE, cough.    Hospital Course:  Pt admitted to observation for LE edema. CXR unremarkable. BNP WNL. IV lasix 40 mg BID. 2d shows pEF without wall motion abnormalities. BLE US shows occlusive DVT within L popliteal. Coumadin/lovenox bridge started. PT/OT rec SNF. Referrals to be placed by SW. Discharged pending SNF.    Interval History: No acute events overnight. This AM, pt sitting upright in bed. No complaints at this time. Discussed plan of care. Called to bedside to speak to wife; rounded twice and wife not at bedside. SW and myself spoke to wife on phone.    Review of Systems   Constitutional: Positive for activity change. Negative for appetite change, chills, diaphoresis, fatigue, fever and unexpected weight change.   Respiratory: Negative for cough, choking, chest tightness, shortness of breath and wheezing.    Cardiovascular: Positive for leg swelling. Negative for chest pain and palpitations.   Gastrointestinal: Negative for abdominal distention, abdominal  pain, constipation, diarrhea, nausea and vomiting.   Musculoskeletal: Positive for gait problem. Negative for arthralgias, back pain, joint swelling and myalgias.   Neurological: Positive for weakness. Negative for dizziness, light-headedness and numbness.   Psychiatric/Behavioral: Negative for agitation, confusion, hallucinations and suicidal ideas.     Objective:     Vital Signs (Most Recent):  Temp: 98.7 °F (37.1 °C) (10/05/17 1230)  Pulse: 80 (10/05/17 1230)  Resp: 18 (10/05/17 1230)  BP: 139/74 (10/05/17 1230)  SpO2: 96 % (10/05/17 1230) Vital Signs (24h Range):  Temp:  [97.9 °F (36.6 °C)-98.7 °F (37.1 °C)] 98.7 °F (37.1 °C)  Pulse:  [] 80  Resp:  [16-18] 18  SpO2:  [94 %-98 %] 96 %  BP: ()/(55-81) 139/74     Weight: 93 kg (205 lb)  Body mass index is 31.17 kg/m².    Intake/Output Summary (Last 24 hours) at 10/05/17 1400  Last data filed at 10/05/17 0237   Gross per 24 hour   Intake              540 ml   Output              252 ml   Net              288 ml      Physical Exam   Constitutional: He is oriented to person, place, and time. He appears well-developed and well-nourished. No distress.   Neck: Carotid bruit is not present.   Cardiovascular: Normal rate and regular rhythm.    No murmur heard.  Pulmonary/Chest: Effort normal and breath sounds normal. No respiratory distress. He has no wheezes.   Abdominal: Soft. Bowel sounds are normal. He exhibits no distension. There is no splenomegaly or hepatomegaly. There is no tenderness.   Musculoskeletal: Normal range of motion. He exhibits edema (nonpitting ).   Neurological: He is alert and oriented to person, place, and time. No cranial nerve deficit.   Skin: Skin is warm and dry. No rash noted. He is not diaphoretic. No erythema.   Psychiatric: He has a normal mood and affect. His behavior is normal.   Nursing note and vitals reviewed.      Significant Labs: All pertinent labs within the past 24 hours have been reviewed.    Significant Imaging: I  have reviewed all pertinent imaging results/findings within the past 24 hours.    Assessment/Plan:      * Acute deep vein thrombosis (DVT) of popliteal vein of left lower extremity    - BLE US to rule out DVTs as patient reports decreased ambulation  - US reveals DVT in L popliteal  - Discussed risks/benefits of anticoagulation with wife and patient; started coumadin/lovenox bridge  - INR daily; pharm D following  - Will need follow up with PCP/coumadin clinic        Lower extremity edema    - BNP 70, no echo on file.  CXR unremarkable.  - Patient received Lasix 40mg IV in ER  - Albumin 2.9.  If no improvement with Lasix, consider adding albumin.  - Patient would likely benefit from compression stockings on discharge, considering h/o autonomic instability and BLE edema.  - Strict I&Os, daily weights, cardiac diet.  Elevate BLE.  - 2d shows pEF without wall motion abnormalities  - BLE edema improved with IV lasix. Will continue to monitor         Physical deconditioning    - Patient reports decreased ambulation while at SNF.  - Will consult PT/OT, SW.  - Fall precautions, ambulate with assistance, incentive spirometry.  - PT/OT rec SNF  10/3: Wife agreeable to SNF. SW to place referrals.   10/4: Denied [facility]  10/5: SW placed more referrals for SNF          Autonomic instability    - Continue midodrine 10mg TID.            VTE Risk Mitigation         Ordered     warfarin tablet 7.5 mg  Daily     Route:  Oral        10/05/17 1001     enoxaparin injection 100 mg  Every 12 hours (non-standard times)     Route:  Subcutaneous        10/02/17 0839     Medium Risk of VTE  Once      10/02/17 0230     Place TIGRE hose  Until discontinued      10/02/17 0230     Place sequential compression device  Until discontinued      10/02/17 0230              Elida Denton PA-C  Department of Hospital Medicine   [facility]

## 2017-10-05 NOTE — PT/OT/SLP PROGRESS
Physical Therapy      Gnenitesh Dey  MRN: [id number]    Patient not seen today secondary to pt in nursing care during attempt. Was unable to return for second attempt. Will follow-up at next scheduled visit per PT POC.    Nadeen Bowman, HALEY

## 2017-10-05 NOTE — ASSESSMENT & PLAN NOTE
- BLE US to rule out DVTs as patient reports decreased ambulation  - US reveals DVT in L popliteal  - Discussed risks/benefits of anticoagulation with wife and patient; started coumadin/lovenox bridge  - INR daily; pharm D following  - Will need follow up with PCP/coumadin clinic

## 2017-10-05 NOTE — CONSULTS
PharmD Consult received for:  1.) Warfarin dosing (new start):   · Indication: DVT  · Current INR: 1.2  · 5 mg PO daily initiated with Lovenox bridge  · Patient needs enrollment in CC - as long as patient has either a Cardiologist or PCP in system, pt may be enrolled - please alert me if you need assistance with this.  · Increase dose to 7.5 mg tonight  · INR daily  · Will follow      Thank you for the consult,    Elida Mary, PharmD  [id number]      **Note: Consults are reviewed Monday-Friday 7:30-4pm. The above recommendations are only suggested. The recommendations should be considered in conjunction with all patient factors.**

## 2017-10-05 NOTE — ASSESSMENT & PLAN NOTE
- Patient reports decreased ambulation while at SNF.  - Will consult PT/OT, SW.  - Fall precautions, ambulate with assistance, incentive spirometry.  - PT/OT rec SNF  10/3: Wife agreeable to SNF. SW to place referrals.   10/4: Denied [facility]  10/5: SW placed more referrals for SNF

## 2017-10-05 NOTE — SUBJECTIVE & OBJECTIVE
Interval History: No acute events overnight. This AM, pt sitting upright in bed. No complaints at this time. Discussed plan of care. Called to bedside to speak to wife; rounded twice and wife not at bedside. SW and myself spoke to wife on phone.    Review of Systems   Constitutional: Positive for activity change. Negative for appetite change, chills, diaphoresis, fatigue, fever and unexpected weight change.   Respiratory: Negative for cough, choking, chest tightness, shortness of breath and wheezing.    Cardiovascular: Positive for leg swelling. Negative for chest pain and palpitations.   Gastrointestinal: Negative for abdominal distention, abdominal pain, constipation, diarrhea, nausea and vomiting.   Musculoskeletal: Positive for gait problem. Negative for arthralgias, back pain, joint swelling and myalgias.   Neurological: Positive for weakness. Negative for dizziness, light-headedness and numbness.   Psychiatric/Behavioral: Negative for agitation, confusion, hallucinations and suicidal ideas.     Objective:     Vital Signs (Most Recent):  Temp: 98.7 °F (37.1 °C) (10/05/17 1230)  Pulse: 80 (10/05/17 1230)  Resp: 18 (10/05/17 1230)  BP: 139/74 (10/05/17 1230)  SpO2: 96 % (10/05/17 1230) Vital Signs (24h Range):  Temp:  [97.9 °F (36.6 °C)-98.7 °F (37.1 °C)] 98.7 °F (37.1 °C)  Pulse:  [] 80  Resp:  [16-18] 18  SpO2:  [94 %-98 %] 96 %  BP: ()/(55-81) 139/74     Weight: 93 kg (205 lb)  Body mass index is 31.17 kg/m².    Intake/Output Summary (Last 24 hours) at 10/05/17 1400  Last data filed at 10/05/17 0237   Gross per 24 hour   Intake              540 ml   Output              252 ml   Net              288 ml      Physical Exam   Constitutional: He is oriented to person, place, and time. He appears well-developed and well-nourished. No distress.   Neck: Carotid bruit is not present.   Cardiovascular: Normal rate and regular rhythm.    No murmur heard.  Pulmonary/Chest: Effort normal and breath sounds  normal. No respiratory distress. He has no wheezes.   Abdominal: Soft. Bowel sounds are normal. He exhibits no distension. There is no splenomegaly or hepatomegaly. There is no tenderness.   Musculoskeletal: Normal range of motion. He exhibits edema (nonpitting ).   Neurological: He is alert and oriented to person, place, and time. No cranial nerve deficit.   Skin: Skin is warm and dry. No rash noted. He is not diaphoretic. No erythema.   Psychiatric: He has a normal mood and affect. His behavior is normal.   Nursing note and vitals reviewed.      Significant Labs: All pertinent labs within the past 24 hours have been reviewed.    Significant Imaging: I have reviewed all pertinent imaging results/findings within the past 24 hours.

## 2017-10-05 NOTE — NURSING
Patient BP reading resulted in 202/91 and checked manually and revealed a  reading of 181/81. Patient has a PMH of autonomic instability and presently on a regimen of midodrine 10 mg three times a day.  Medicine Team E notified and no new orders given. Will continue to monitor closely.

## 2017-10-05 NOTE — PLAN OF CARE
SW spoke w/wife who has provided SW w/3 additional preferences:   1. [facility]  2. [facility][1]  3. [facility]    SW has requested [facility] to send referrals.    Adele Davison LMSW  [id number]

## 2017-10-05 NOTE — PLAN OF CARE
Referral sent to     [facility]

## 2017-10-05 NOTE — ASSESSMENT & PLAN NOTE
- BNP 70, no echo on file.  CXR unremarkable.  - Patient received Lasix 40mg IV in ER  - Albumin 2.9.  If no improvement with Lasix, consider adding albumin.  - Patient would likely benefit from compression stockings on discharge, considering h/o autonomic instability and BLE edema.  - Strict I&Os, daily weights, cardiac diet.  Elevate BLE.  - 2d shows pEF without wall motion abnormalities  - BLE edema improved with IV lasix. Will continue to monitor

## 2017-10-05 NOTE — PLAN OF CARE
Problem: VTE, DVT and PE (Adult)  Intervention: Monitor/Manage Pain   10/05/17 8832   Safety Interventions   Medication Review/Management medications reviewed;dosing adjusted;high risk medications identified   Pain/Comfort/Sleep Interventions   Pain Management Interventions medication;position adjusted     Patient alert to self and place.  Patient denies pain during nursing assessment. Patient presents with L L E DVT and is on a regimen of Coumadin 5 mg , which was increased to 7.5 based on a PT/INR results of 12.5/1.2 along with Lovenox  100 mg injection.  Patient BP fluctuates [refer to nurses notes], the Medicine Team recommended to just monitor Bp.  Lasix IV and telemetry discontinued.  Wife is at bedside and  and medicine team spoke to wife about discharge planning to an alternative short term rehab facility.  PPD to be read in 48-72 hours.  Patient and wife verbalized an understanding of discharge planning.

## 2017-10-05 NOTE — PLAN OF CARE
Problem: Patient Care Overview  Goal: Plan of Care Review  Outcome: Ongoing (interventions implemented as appropriate)  Plan of care discussed with pt. No questions or concerns at present. Pt educated on fall risk and procedures acknowledged understanding. No distress at present time. VSS; weight shift and turn q 2 hours provided. Pt wears adult briefs with toileting offered q 2 hours. Remains on anticoagulation therapy for DVT tolerating well. No s/s of bleeding noted. Will cont to monitor; call light in reach.

## 2017-10-06 LAB
INR PPP: 1.3
PROTHROMBIN TIME: 13.6 SEC
TB INDURATION 48 - 72 HR READ: 10 MM

## 2017-10-06 PROCEDURE — 25000003 PHARM REV CODE 250: Performed by: EMERGENCY MEDICINE

## 2017-10-06 PROCEDURE — 85610 PROTHROMBIN TIME: CPT

## 2017-10-06 PROCEDURE — 99232 SBSQ HOSP IP/OBS MODERATE 35: CPT | Mod: ,,, | Performed by: PHYSICIAN ASSISTANT

## 2017-10-06 PROCEDURE — 36415 COLL VENOUS BLD VENIPUNCTURE: CPT

## 2017-10-06 PROCEDURE — 97530 THERAPEUTIC ACTIVITIES: CPT

## 2017-10-06 PROCEDURE — 25000003 PHARM REV CODE 250: Performed by: PHYSICIAN ASSISTANT

## 2017-10-06 PROCEDURE — 11000001 HC ACUTE MED/SURG PRIVATE ROOM

## 2017-10-06 PROCEDURE — 63600175 PHARM REV CODE 636 W HCPCS: Performed by: PHYSICIAN ASSISTANT

## 2017-10-06 RX ORDER — MIDODRINE HYDROCHLORIDE 5 MG/1
5 TABLET ORAL 3 TIMES DAILY
Status: DISCONTINUED | OUTPATIENT
Start: 2017-10-06 | End: 2017-10-10 | Stop reason: HOSPADM

## 2017-10-06 RX ORDER — ENOXAPARIN SODIUM 100 MG/ML
1 INJECTION SUBCUTANEOUS EVERY 12 HOURS
Qty: 60 ML | Refills: 0 | Status: SHIPPED | OUTPATIENT
Start: 2017-10-06 | End: 2017-10-09 | Stop reason: HOSPADM

## 2017-10-06 RX ORDER — MIDODRINE HYDROCHLORIDE 10 MG/1
5 TABLET ORAL 3 TIMES DAILY
Qty: 90 TABLET | Refills: 0 | Status: ON HOLD | OUTPATIENT
Start: 2017-10-06 | End: 2018-02-02

## 2017-10-06 RX ORDER — ACETAMINOPHEN 325 MG/1
650 TABLET ORAL EVERY 4 HOURS PRN
Refills: 0 | COMMUNITY
Start: 2017-10-06 | End: 2017-10-14

## 2017-10-06 RX ORDER — WARFARIN 7.5 MG/1
7.5 TABLET ORAL DAILY
Qty: 30 TABLET | Refills: 0 | Status: SHIPPED | OUTPATIENT
Start: 2017-10-06 | End: 2017-10-09

## 2017-10-06 RX ADMIN — MIDODRINE HYDROCHLORIDE 10 MG: 5 TABLET ORAL at 05:10

## 2017-10-06 RX ADMIN — STANDARDIZED SENNA CONCENTRATE AND DOCUSATE SODIUM 1 TABLET: 8.6; 5 TABLET, FILM COATED ORAL at 08:10

## 2017-10-06 RX ADMIN — STANDARDIZED SENNA CONCENTRATE AND DOCUSATE SODIUM 1 TABLET: 8.6; 5 TABLET, FILM COATED ORAL at 09:10

## 2017-10-06 RX ADMIN — ACETAMINOPHEN 650 MG: 325 TABLET ORAL at 08:10

## 2017-10-06 RX ADMIN — WARFARIN SODIUM 7.5 MG: 2.5 TABLET ORAL at 04:10

## 2017-10-06 RX ADMIN — ENOXAPARIN SODIUM 100 MG: 100 INJECTION SUBCUTANEOUS at 08:10

## 2017-10-06 RX ADMIN — ENOXAPARIN SODIUM 100 MG: 100 INJECTION SUBCUTANEOUS at 09:10

## 2017-10-06 NOTE — PLAN OF CARE
[facility]     Department of Hospital Medicine     [address] [place] [address]     [phone] after hours  [phone] fax       NURSING HOME ORDERS    10/06/2017    Admit to Nursing Home:   Skilled Bed                                                  Diagnoses:  Active Hospital Problems    Diagnosis  POA    *Acute deep vein thrombosis (DVT) of popliteal vein of left lower extremity [I82.432]  Yes     Priority: 1 - High    Lower extremity edema [R60.0]  Yes     Priority: 2     Physical deconditioning [R53.81]  Yes     Priority: 3     Autonomic instability [G90.9]  Yes     Priority: 4      Chronic      Resolved Hospital Problems    Diagnosis Date Resolved POA   No resolved problems to display.       Patient is homebound due to:  Acute deep vein thrombosis (DVT) of popliteal vein of left lower extremity    Allergies:Review of patient's allergies indicates:  No Known Allergies    Vitals:       Every shift (Skilled Nursing patients)    Diet: Adult Regular Diet    Acitivities:      - Up in a chair each morning as tolerated   - Ambulate with assistance to bathroom   - Scheduled walks once each shift (every 8 hours)    LABS:  Per facility protocol   CMP, CBC each month for 3 months   PT-INR twice each week    Pre-albumin each month for 3 months   TSH every year    Nursing Precautions:    - Aspiration precautions:             - Total assistance with meals            -  Upright 90 degrees befor during and after meals      - Fall precautions per nursing home protocol   - Seizure precaution per [facility] protocol   - Decubitus precautions:        -  for positioning   - Pressure reducing foam mattress   - Turn patient every two hours. Use wedge pillows to anchor patient    CONSULTS:      Physical Therapy to evaluate and treat     Occupational Therapy to evaluate and treat           Medications: Discontinue all previous medication orders, if any. See new  list below.   Tona Dey   Home Medication Instructions JOSE:[id number]    Printed on:10/09/17 2530   Medication Information                      acetaminophen (TYLENOL) 325 MG tablet  Take 2 tablets (650 mg total) by mouth every 4 (four) hours as needed.             carbidopa-levodopa  mg (SINEMET)  mg per tablet  Take 1 tablet by mouth 3 (three) times daily.             midodrine (PROAMATINE) 10 MG tablet  Take 0.5 tablets (5 mg total) by mouth 3 (three) times daily.             warfarin (COUMADIN) 5 MG tablet  Take 1 tablet (5 mg total) by mouth Daily.                   _________________________________  Ashlie Bush PA-C  10/06/2017

## 2017-10-06 NOTE — CONSULTS
PharmD Consult received for:  1.) Warfarin dosing (new start):   · Indication: DVT  · Current INR: 1.3  · Lovenox bridge  · Patient needs enrollment in [facility] - as long as patient has either a Cardiologist or PCP in system, pt may be enrolled - please alert me if you need assistance with this.  · Continue 7.5 mg PO tonight - would give a total of 3 doses of 7.5 mg (tonight will be dose 2) before deciding to increase further. Goal is for INR to move no more than 0.2-0.3 in 24 hour period.   · INR daily  · Will follow      Thank you for the consult,    Elida Mary, PharmD  [id number]      **Note: Consults are reviewed Monday-Friday 7:30-4pm. The above recommendations are only suggested. The recommendations should be considered in conjunction with all patient factors.**

## 2017-10-06 NOTE — PT/OT/SLP PROGRESS
Physical Therapy  Treatment    Tona Dey   MRN: [id number]   Admitting Diagnosis: Acute deep vein thrombosis (DVT) of popliteal vein of left lower extremity    PT Received On: 10/06/17  PT Start Time: 1300     PT Stop Time: 1325    PT Total Time (min): 25 min       Billable Minutes:  Therapeutic Activity 25    Treatment Type: Treatment  PT/PTA: PTA     PTA Visit Number: 1       General Precautions: Standard, fall  Orthopedic Precautions: N/A   Braces: N/A    Do you have any cultural, spiritual, Adventist conflicts, given your current situation?: none stated    Subjective:  Communicated with nursing prior to session.  Pt agreed to work with therapy.     Pain/Comfort  Pain Rating 1: 0/10  Pain Rating Post-Intervention 1: 0/10    Objective:   Patient found with:  (all lines intact)    Functional Mobility:  Bed Mobility:   Scooting/Bridging: Maximum Assistance, Total Assistance, With assist of 2 (Max A to scoot to EOB; Total A x2 to scoot to HOB via drawsheet technique)  Supine to Sit:  (Not performed 2* pt found seated UIC.)  Sit to Supine: Moderate Assistance, With leg lift    Transfers:  Sit <> Stand Assistance: Moderate Assistance (to/from bedside chair x2 trials; to/from EOB x2 trials)  Sit <> Stand Assistive Device: Rolling Walker, No Assistive Device    Gait:   Gait Distance:  (8 ft., and 6 side steps towards HOB. )  Assistance 1: Moderate assistance (Cueing for RW mgmt)  Gait Assistive Device: Rolling walker  Gait Deviation(s): decreased laura, increased time in double stance, decreased step length, decreased velocity of limb motion, decreased toe-to-floor clearance, decreased weight-shifting ability    Therapeutic Activities and Exercises:  B LE therex x15 reps with assistance:    -AP   -LAQ   -Hip Flexion     AM-PAC 6 CLICK MOBILITY  How much help from another person does this patient currently need?   1 = Unable, Total/Dependent Assistance  2 = A lot, Maximum/Moderate Assistance  3 = A little,  Minimum/Contact Guard/Supervision  4 = None, Modified [place]/Independent    Turning over in bed (including adjusting bedclothes, sheets and blankets)?: 3  Sitting down on and standing up from a chair with arms (e.g., wheelchair, bedside commode, etc.): 2  Moving from lying on back to sitting on the side of the bed?: 3  Moving to and from a bed to a chair (including a wheelchair)?: 2  Need to walk in hospital room?: 2  Climbing 3-5 steps with a railing?: 1  Total Score: 13    AM-PAC Raw Score CMS G-Code Modifier Level of Impairment Assistance   6 % Total / Unable   7 - 9 CM 80 - 100% Maximal Assist   10 - 14 CL 60 - 80% Moderate Assist   15 - 19 CK 40 - 60% Moderate Assist   20 - 22 CJ 20 - 40% Minimal Assist   23 CI 1-20% SBA / CGA   24 CH 0% Independent/ Mod I     Patient left supine with all lines intact, call button in reach and bed alarm on.    Assessment:  Tona Dey is a 85 y.o. male with a medical diagnosis of Acute deep vein thrombosis (DVT) of popliteal vein of left lower extremity and presents with all deficits noted below. Pt tolerated treatment fairly well, and will continue to benefit from skilled PT services at this time. Continue with PT POC as indicated.    Rehab identified problem list/impairments: Rehab identified problem list/impairments: weakness, impaired endurance, impaired self care skills, impaired functional mobilty, gait instability, impaired balance, decreased upper extremity function, impaired cognition, decreased coordination, decreased safety awareness, decreased ROM, impaired skin, edema    Rehab potential is fair.    Activity tolerance: Fair    Discharge recommendations: Discharge Facility/Level Of Care Needs: nursing facility, skilled     Barriers to discharge: Barriers to Discharge: Decreased caregiver support    Equipment recommendations: Equipment Needed After Discharge: none     GOALS:    Physical Therapy Goals        Problem: Physical Therapy Goal    Goal  Priority Disciplines Outcome Goal Variances Interventions   Physical Therapy Goal     PT/OT, PT Ongoing (interventions implemented as appropriate)     Description:  Goals to be met by: 10/12/17    Patient will increase functional independence with mobility by performin. Supine to sit with Stand-by Assistance  2. Sit to supine with Stand-by Assistance  3. Sit to stand transfer with Contact Guard Assistance w/ RW  4. Bed to chair transfer with Minimal Assistance using Rolling Walker  5. Gait  x 30 feet with Minimal Assistance using Rolling Walker.   6. Lower extremity exercise program x15 reps per handout, with supervision                      PLAN:    Patient to be seen 3 x/week  to address the above listed problems via gait training, therapeutic activities, therapeutic exercises, neuromuscular re-education  Plan of Care expires: 17  Plan of Care reviewed with: patient         Nadeen Colby, PTA  10/06/2017

## 2017-10-06 NOTE — PROGRESS NOTES
[facility] Hospital Medicine  Progress Note    Patient Name: Tona Dey  MRN: [id number]  Patient Class: IP- Inpatient   Admission Date: 10/1/2017  Length of Stay: 4 days  Attending Physician: Aracelis Langston MD  Primary Care Provider: Primary Doctor [facility] Medicine Team: [facility] Elida Denton PA-C    Subjective:     Principal Problem:Acute deep vein thrombosis (DVT) of popliteal vein of left lower extremity    HPI:  Patient is an 85 year old gentleman with a h/o autonomic instability.  Patient is a poor historian.  He presents with worsening SOB and BLE swelling.  Patient was admitted to a SNF until two days ago (secondary to shoulder injury), but states that he spent most of his admission there in a wheelchair.  He was previously able to walk about 100ft before getting SOB, but now has HENSON when walking across a room.  Denies abdominal distension, chest pain, dizziness, palpitations, fever/chills, N/V, pain to BLE, cough.    Hospital Course:  Pt admitted to observation for LE edema. CXR unremarkable. BNP WNL. IV lasix 40 mg BID. 2d shows pEF without wall motion abnormalities. BLE US shows occlusive DVT within L popliteal. Coumadin/lovenox bridge started. PT/OT rec SNF. Referrals to be placed by . Discharged pending SNF. Per , pt has been medically accepted to [facility] and awaiting auth from [facility].    Interval History: No acute events overnight. Remains at baseline. No complaints at this time. Discharge pending SNF placement.    Review of Systems   Constitutional: Positive for activity change. Negative for appetite change, chills, diaphoresis, fatigue, fever and unexpected weight change.   Respiratory: Negative for cough, choking, chest tightness, shortness of breath and wheezing.    Cardiovascular: Positive for leg swelling. Negative for chest pain and palpitations.   Gastrointestinal: Negative for abdominal distention, abdominal pain, constipation,  diarrhea, nausea and vomiting.   Musculoskeletal: Positive for gait problem. Negative for arthralgias, back pain, joint swelling and myalgias.   Neurological: Positive for weakness. Negative for dizziness, light-headedness and numbness.   Psychiatric/Behavioral: Negative for agitation, confusion, hallucinations and suicidal ideas.     Objective:     Vital Signs (Most Recent):  Temp: 96.7 °F (35.9 °C) (10/06/17 1559)  Pulse: 88 (10/06/17 1559)  Resp: 18 (10/06/17 1559)  BP: (!) 183/77 (10/06/17 1559)  SpO2: 96 % (10/06/17 1145) Vital Signs (24h Range):  Temp:  [96 °F (35.6 °C)-99.1 °F (37.3 °C)] 96.7 °F (35.9 °C)  Pulse:  [82-88] 88  Resp:  [17-18] 18  SpO2:  [95 %-96 %] 96 %  BP: (131-183)/(67-85) 183/77     Weight: 93 kg (205 lb)  Body mass index is 31.17 kg/m².    Intake/Output Summary (Last 24 hours) at 10/06/17 1827  Last data filed at 10/06/17 0500   Gross per 24 hour   Intake              360 ml   Output              150 ml   Net              210 ml      Physical Exam   Constitutional: He is oriented to person, place, and time. He appears well-developed and well-nourished. No distress.   Neck: Carotid bruit is not present.   Cardiovascular: Normal rate and regular rhythm.    No murmur heard.  Pulmonary/Chest: Effort normal and breath sounds normal. No respiratory distress. He has no wheezes.   Abdominal: Soft. Bowel sounds are normal. He exhibits no distension. There is no splenomegaly or hepatomegaly. There is no tenderness.   Musculoskeletal: Normal range of motion. He exhibits edema (nonpitting ).   Neurological: He is alert and oriented to person, place, and time. No cranial nerve deficit.   Skin: Skin is warm and dry. No rash noted. He is not diaphoretic. No erythema.   Psychiatric: He has a normal mood and affect. His behavior is normal.   Nursing note and vitals reviewed.      Significant Labs: All pertinent labs within the past 24 hours have been reviewed.    Significant Imaging: I have reviewed all  pertinent imaging results/findings within the past 24 hours.    Assessment/Plan:      * Acute deep vein thrombosis (DVT) of popliteal vein of left lower extremity    - BLE US to rule out DVTs as patient reports decreased ambulation  - US reveals DVT in L popliteal  - Discussed risks/benefits of anticoagulation with wife and patient; started coumadin/lovenox bridge  - INR daily; pharm D following  10/6: INR 1.3. Continue coumadin 7.5 mg x2 nights before adjusting further per pharm d.  - Placed referral to coumadin clinic        Lower extremity edema    - BNP 70, no echo on file.  CXR unremarkable.  - Patient received Lasix 40mg IV in ER  - Albumin 2.9.  If no improvement with Lasix, consider adding albumin.  - Patient would likely benefit from compression stockings on discharge, considering h/o autonomic instability and BLE edema.  - Strict I&Os, daily weights, cardiac diet.  Elevate BLE.  - 2d shows pEF without wall motion abnormalities  - BLE edema improved with IV lasix. Will continue to monitor         Physical deconditioning    - Patient reports decreased ambulation while at SNF.  - Will consult PT/OT, SW.  - Fall precautions, ambulate with assistance, incentive spirometry.  - PT/OT rec SNF  10/3: Wife agreeable to SNF.  to place referrals.   10/4: Denied [facility]  10/5: SW placed more referrals for SNF  Discharged pending SNF. Per ENDY, pt has been medically accepted to [facility] and awaiting auth from [facility].        Autonomic instability    - Decreased midodrine to 5mg TID with parameters            VTE Risk Mitigation         Ordered     warfarin tablet 7.5 mg  Daily     Route:  Oral        10/05/17 1001     enoxaparin injection 100 mg  Every 12 hours (non-standard times)     Route:  Subcutaneous        10/02/17 0839     Medium Risk of VTE  Once      10/02/17 0230     Place TIGRE hose  Until discontinued      10/02/17 0230     Place sequential compression device  Until discontinued       10/02/17 0230              Elida Denton PA-C  Department of Hospital Medicine   [facility]

## 2017-10-06 NOTE — ASSESSMENT & PLAN NOTE
- Patient reports decreased ambulation while at SNF.  - Will consult PT/OT, SW.  - Fall precautions, ambulate with assistance, incentive spirometry.  - PT/OT rec SNF  10/3: Wife agreeable to SNF. SW to place referrals.   10/4: Denied [facility]  10/5: SW placed more referrals for SNF  Discharged pending SNF. Per SW, pt has been medically accepted to [facility] and awaiting auth from N.

## 2017-10-06 NOTE — SUBJECTIVE & OBJECTIVE
Interval History: No acute events overnight. Remains at baseline. No complaints at this time. Discharge pending SNF placement.    Review of Systems   Constitutional: Positive for activity change. Negative for appetite change, chills, diaphoresis, fatigue, fever and unexpected weight change.   Respiratory: Negative for cough, choking, chest tightness, shortness of breath and wheezing.    Cardiovascular: Positive for leg swelling. Negative for chest pain and palpitations.   Gastrointestinal: Negative for abdominal distention, abdominal pain, constipation, diarrhea, nausea and vomiting.   Musculoskeletal: Positive for gait problem. Negative for arthralgias, back pain, joint swelling and myalgias.   Neurological: Positive for weakness. Negative for dizziness, light-headedness and numbness.   Psychiatric/Behavioral: Negative for agitation, confusion, hallucinations and suicidal ideas.     Objective:     Vital Signs (Most Recent):  Temp: 96.7 °F (35.9 °C) (10/06/17 1559)  Pulse: 88 (10/06/17 1559)  Resp: 18 (10/06/17 1559)  BP: (!) 183/77 (10/06/17 1559)  SpO2: 96 % (10/06/17 1145) Vital Signs (24h Range):  Temp:  [96 °F (35.6 °C)-99.1 °F (37.3 °C)] 96.7 °F (35.9 °C)  Pulse:  [82-88] 88  Resp:  [17-18] 18  SpO2:  [95 %-96 %] 96 %  BP: (131-183)/(67-85) 183/77     Weight: 93 kg (205 lb)  Body mass index is 31.17 kg/m².    Intake/Output Summary (Last 24 hours) at 10/06/17 1827  Last data filed at 10/06/17 0500   Gross per 24 hour   Intake              360 ml   Output              150 ml   Net              210 ml      Physical Exam   Constitutional: He is oriented to person, place, and time. He appears well-developed and well-nourished. No distress.   Neck: Carotid bruit is not present.   Cardiovascular: Normal rate and regular rhythm.    No murmur heard.  Pulmonary/Chest: Effort normal and breath sounds normal. No respiratory distress. He has no wheezes.   Abdominal: Soft. Bowel sounds are normal. He exhibits no distension.  There is no splenomegaly or hepatomegaly. There is no tenderness.   Musculoskeletal: Normal range of motion. He exhibits edema (nonpitting ).   Neurological: He is alert and oriented to person, place, and time. No cranial nerve deficit.   Skin: Skin is warm and dry. No rash noted. He is not diaphoretic. No erythema.   Psychiatric: He has a normal mood and affect. His behavior is normal.   Nursing note and vitals reviewed.      Significant Labs: All pertinent labs within the past 24 hours have been reviewed.    Significant Imaging: I have reviewed all pertinent imaging results/findings within the past 24 hours.

## 2017-10-06 NOTE — PLAN OF CARE
Problem: Patient Care Overview  Goal: Plan of Care Review  Outcome: Ongoing (interventions implemented as appropriate)  Pt continues on anticoagulation

## 2017-10-06 NOTE — PT/OT/SLP PROGRESS
"Occupational Therapy  Treatment    Tona Dey   MRN: [id number]   Admitting Diagnosis: Acute deep vein thrombosis (DVT) of popliteal vein of left lower extremity    OT Date of Treatment: 10/06/17   OT Start Time: 1110  OT Stop Time: 1123  OT Total Time (min): 13 min    Billable Minutes:  Therapeutic Activity 13 minutes    General Precautions: Standard, fall    Do you have any cultural, spiritual, Restoration conflicts, given your current situation?: none stated    Subjective:  Communicated with RN prior to session.  "Ok give me a second."  Pain/Comfort  Pain Rating 1: 0/10  Pain Rating Post-Intervention 1: 0/10    Objective:  Patient found with: telemetry, pt found supine in bed and agreeable to therapy.     Functional Mobility:  Bed Mobility:  Supine to Sit: Minimum Assistance    Transfers:  Sit <> Stand Assistance: Moderate Assistance  Sit <> Stand Assistive Device: No Assistive Device  Bed <> Chair Technique: Stand Pivot  Bed <> Chair Transfer Assistance: Moderate Assistance  Bed <> Chair Assistive Device: No Assistive Device    Functional Ambulation: Mod A stand pivot transfer with no AD.    Activities of Daily Living:    UE Dressing Level of Assistance: Moderate assistance (gown as robe)  LE Dressing Level of Assistance: Total assistance (pt confused; slippers)    Balance:   Static Sit: FAIR+: Able to take MINIMAL challenges from all directions  Dynamic Sit: FAIR: Cannot move trunk without losing balance  Static Stand: POOR: Needs MODERATE assist to maintain  Dynamic stand: POOR: N/A    Therapeutic Activities and Exercises:  Pt ed re OT role and POC. Pt performed supine to sit with Min A. Pt required Mod A to don gown as robe and Total A to don slippers. Pt performed stand pivot t/f with Mod A and no AD.    AM-PAC 6 CLICK ADL   How much help from another person does this patient currently need?   1 = Unable, Total/Dependent Assistance  2 = A lot, Maximum/Moderate Assistance  3 = A little, Minimum/Contact " "Guard/Supervision  4 = None, Modified [place]/Independent    Putting on and taking off regular lower body clothing? : 1  Bathing (including washing, rinsing, drying)?: 2  Toileting, which includes using toilet, bedpan, or urinal? : 1  Putting on and taking off regular upper body clothing?: 2  Taking care of personal grooming such as brushing teeth?: 2  Eating meals?: 3  Total Score: 11     AM-PAC Raw Score CMS "G-Code Modifier Level of Impairment Assistance   6 % Total / Unable   7 - 8 CM 80 - 100% Maximal Assist   9-13 CL 60 - 80% Moderate Assist   14 - 19 CK 40 - 60% Moderate Assist   20 - 22 CJ 20 - 40% Minimal Assist   23 CI 1-20% SBA / CGA   24 CH 0% Independent/ Mod I       Patient left up in chair with call button in reach and wife present    ASSESSMENT:  Tona Dey is a 85 y.o. male with a medical diagnosis of Acute deep vein thrombosis (DVT) of popliteal vein of left lower extremity and presents with the impairments listed below. Pt participates well with verbal and tactile cues. Pt appears confused, but is easily re-directed and able to follow simple commands. Pt has declined in function slightly, but would still benefit from cont OT and SNF placement to improve mobility and (I)ce in self care.    Rehab identified problem list/impairments: Rehab identified problem list/impairments: weakness, impaired endurance, impaired self care skills, impaired functional mobilty, gait instability, impaired balance, decreased upper extremity function, impaired cognition, decreased coordination, decreased lower extremity function, decreased safety awareness, decreased ROM, impaired skin, edema    Rehab potential is good.    Activity tolerance: Fair    Discharge recommendations: Discharge Facility/Level Of Care Needs: nursing facility, skilled     Barriers to discharge: Barriers to Discharge: Decreased caregiver support    Equipment recommendations: none     GOALS:    Occupational Therapy Goals        " Problem: Occupational Therapy Goal    Goal Priority Disciplines Outcome Interventions   Occupational Therapy Goal     OT, PT/OT Ongoing (interventions implemented as appropriate)    Description:  Goals to be met by: 10/18/17     Patient will increase functional independence with ADLs by performing:    REVISED:    UE Dressing with Minimal Assistance.  Grooming while standing with Minimal Assistance.  Toileting from bedside commode with Moderate Assistance for hygiene and clothing management.   Supine to sit with Stand-by Assistance.  Stand pivot transfers with Minimal Assistance.                     Plan:  Patient to be seen 3 x/week to address the above listed problems via self-care/home management, therapeutic activities, therapeutic exercises, neuromuscular re-education, cognitive retraining  Plan of Care expires: 11/03/17  Plan of Care reviewed with: patient, spouse    DANIS Beasley  10/6/2017  Pager: [phone]

## 2017-10-06 NOTE — PLAN OF CARE
SW has faxed nursing home orders to N, [facility] and [facility] through [facility] faxing system.    Per Nanci, admission coordinator for [facility], pt has been medically accepted awaiting auth from [facility].    Adele Davison, [facility]  [phone]

## 2017-10-06 NOTE — PLAN OF CARE
Problem: Occupational Therapy Goal  Goal: Occupational Therapy Goal  Goals to be met by: 10/18/17     Patient will increase functional independence with ADLs by performing:    REVISED:    UE Dressing with Minimal Assistance.  Grooming while standing with Minimal Assistance.  Toileting from bedside commode with Moderate Assistance for hygiene and clothing management.   Supine to sit with Stand-by Assistance.  Stand pivot transfers with Minimal Assistance.   Outcome: Ongoing (interventions implemented as appropriate)  Goals remain appropriate. Cont POC.    DANIS Beasley  10/6/2017  Pager: [phone]

## 2017-10-06 NOTE — PLAN OF CARE
Problem: Physical Therapy Goal  Goal: Physical Therapy Goal  Goals to be met by: 10/12/17    Patient will increase functional independence with mobility by performin. Supine to sit with Stand-by Assistance  2. Sit to supine with Stand-by Assistance  3. Sit to stand transfer with Contact Guard Assistance w/ RW  4. Bed to chair transfer with Minimal Assistance using Rolling Walker  5. Gait  x 30 feet with Minimal Assistance using Rolling Walker.   6. Lower extremity exercise program x15 reps per handout, with supervision     Goals remain appropriate at time. Continue with PT POC as indicated.

## 2017-10-06 NOTE — PLAN OF CARE
Referrals have been faxed to [facility], [facility] and [facility] in [place].    [facility] has declined pt for admission per [facility] faxing system.    [facility] per Nanci, admission, [phone], pt is under review (nursing).    [facility] per Jocelyn, admission, [phone], in the process of checking benefits.    Adele Davison, [facility]  [phone]

## 2017-10-06 NOTE — ASSESSMENT & PLAN NOTE
- BLE US to rule out DVTs as patient reports decreased ambulation  - US reveals DVT in L popliteal  - Discussed risks/benefits of anticoagulation with wife and patient; started coumadin/lovenox bridge  - INR daily; pharm D following  10/6: INR 1.3. Continue coumadin 7.5 mg x2 nights before adjusting further per pharm d.  - Placed referral to coumadin clinic

## 2017-10-07 PROBLEM — G93.41 ENCEPHALOPATHY, METABOLIC: Status: ACTIVE | Noted: 2017-10-07

## 2017-10-07 PROBLEM — G20.C PARKINSONISM: Status: ACTIVE | Noted: 2017-10-07

## 2017-10-07 LAB
ALBUMIN SERPL BCP-MCNC: 3 G/DL
ALP SERPL-CCNC: 59 U/L
ALT SERPL W/O P-5'-P-CCNC: 45 U/L
ANION GAP SERPL CALC-SCNC: 8 MMOL/L
AST SERPL-CCNC: 44 U/L
BASOPHILS # BLD AUTO: 0.01 K/UL
BASOPHILS NFR BLD: 0.1 %
BILIRUB SERPL-MCNC: 1 MG/DL
BUN SERPL-MCNC: 11 MG/DL
CALCIUM SERPL-MCNC: 9 MG/DL
CHLORIDE SERPL-SCNC: 106 MMOL/L
CO2 SERPL-SCNC: 25 MMOL/L
CREAT SERPL-MCNC: 0.9 MG/DL
DIFFERENTIAL METHOD: ABNORMAL
EOSINOPHIL # BLD AUTO: 0.1 K/UL
EOSINOPHIL NFR BLD: 1.5 %
ERYTHROCYTE [DISTWIDTH] IN BLOOD BY AUTOMATED COUNT: 12.9 %
EST. GFR  (AFRICAN AMERICAN): >60 ML/MIN/1.73 M^2
EST. GFR  (NON AFRICAN AMERICAN): >60 ML/MIN/1.73 M^2
GLUCOSE SERPL-MCNC: 84 MG/DL
HCT VFR BLD AUTO: 34.9 %
HGB BLD-MCNC: 11.5 G/DL
INR PPP: 1.6
LYMPHOCYTES # BLD AUTO: 1.2 K/UL
LYMPHOCYTES NFR BLD: 16.4 %
MAGNESIUM SERPL-MCNC: 2.2 MG/DL
MCH RBC QN AUTO: 31.7 PG
MCHC RBC AUTO-ENTMCNC: 33 G/DL
MCV RBC AUTO: 96 FL
MONOCYTES # BLD AUTO: 0.4 K/UL
MONOCYTES NFR BLD: 5.7 %
NEUTROPHILS # BLD AUTO: 5.5 K/UL
NEUTROPHILS NFR BLD: 75.9 %
PHOSPHATE SERPL-MCNC: 2.8 MG/DL
PLATELET # BLD AUTO: 165 K/UL
PMV BLD AUTO: 9.7 FL
POCT GLUCOSE: 89 MG/DL (ref 70–110)
POTASSIUM SERPL-SCNC: 4.2 MMOL/L
PROT SERPL-MCNC: 6.7 G/DL
PROTHROMBIN TIME: 16 SEC
RBC # BLD AUTO: 3.63 M/UL
SODIUM SERPL-SCNC: 139 MMOL/L
WBC # BLD AUTO: 7.19 K/UL

## 2017-10-07 PROCEDURE — 25000003 PHARM REV CODE 250: Performed by: PHYSICIAN ASSISTANT

## 2017-10-07 PROCEDURE — 36415 COLL VENOUS BLD VENIPUNCTURE: CPT

## 2017-10-07 PROCEDURE — 80053 COMPREHEN METABOLIC PANEL: CPT

## 2017-10-07 PROCEDURE — 99223 1ST HOSP IP/OBS HIGH 75: CPT | Mod: ,,, | Performed by: PSYCHIATRY & NEUROLOGY

## 2017-10-07 PROCEDURE — 85610 PROTHROMBIN TIME: CPT

## 2017-10-07 PROCEDURE — 84100 ASSAY OF PHOSPHORUS: CPT

## 2017-10-07 PROCEDURE — 85025 COMPLETE CBC W/AUTO DIFF WBC: CPT

## 2017-10-07 PROCEDURE — 11000001 HC ACUTE MED/SURG PRIVATE ROOM

## 2017-10-07 PROCEDURE — 83735 ASSAY OF MAGNESIUM: CPT

## 2017-10-07 PROCEDURE — 63600175 PHARM REV CODE 636 W HCPCS: Performed by: PHYSICIAN ASSISTANT

## 2017-10-07 PROCEDURE — 99233 SBSQ HOSP IP/OBS HIGH 50: CPT | Mod: ,,, | Performed by: PHYSICIAN ASSISTANT

## 2017-10-07 RX ORDER — BENZONATATE 100 MG/1
100 CAPSULE ORAL 3 TIMES DAILY PRN
Status: DISCONTINUED | OUTPATIENT
Start: 2017-10-07 | End: 2017-10-10 | Stop reason: HOSPADM

## 2017-10-07 RX ADMIN — ENOXAPARIN SODIUM 100 MG: 100 INJECTION SUBCUTANEOUS at 09:10

## 2017-10-07 RX ADMIN — STANDARDIZED SENNA CONCENTRATE AND DOCUSATE SODIUM 1 TABLET: 8.6; 5 TABLET, FILM COATED ORAL at 09:10

## 2017-10-07 RX ADMIN — MIDODRINE HYDROCHLORIDE 5 MG: 5 TABLET ORAL at 09:10

## 2017-10-07 RX ADMIN — WARFARIN SODIUM 7.5 MG: 2.5 TABLET ORAL at 05:10

## 2017-10-07 RX ADMIN — ENOXAPARIN SODIUM 100 MG: 100 INJECTION SUBCUTANEOUS at 10:10

## 2017-10-07 NOTE — PROGRESS NOTES
10/6/17 23:00 pt AAOx4 and interacting with wife at bedside. VSS    10/07/17 00:20 pt resting, arouses to voice and answered question with yes and no answers, but very lethargic.    10/07/17 pt resting open eyes to voice, very lethargic.  No verbal response.    10/07/17 05:00 pt not responding to sternal rub, and vigorous stimuli. After several mins of vigorous stimuli, pt opened eyes slightly, I asked pt to state his name and date of birth and pt was dee to state his name and date of birth in dysthargic voice. Pt then closed his eyes again and I was unable to get pt to open eyes, pt would withdrawal from pain..charge nurse notified, EARLENE notified. VSS, BG 89. Pt taken to CT for scan.    10/07/17 08:00 Pt wife notified of Pt change in LOC.

## 2017-10-07 NOTE — SIGNIFICANT EVENT
Called to bedside for altered mental status - does not follow commands - grimaces to tactile stimuli - pupils appear equal at 2mm with slightly disconjugate gaze - code stroke called and evaluated by neurovascular - CT head without obvious signs of intracranial hemorrhage.   Differentials stroke, seizure, metabolic encephalopathy - MRI brain pending, EEG requested, labs ordered - VS-S maintaining airway without difficulty - tele requested

## 2017-10-07 NOTE — HPI
84 y/o male who was last seen normal at 2300 on 10-6-17 when he knew his name and . Stroke code was called at 0600 due to unresponsiveness. Evaluated on CT scan table as patient in bed on way to CT scan. Patient is moving all extremities to painful stimuli and on his own but not opening eyes or verbal. Patient admitted for LE edema and increase in SOB and has DVT on Coumadin. Nurse states that the patient has espisodes of confusion. Had been in a SNF recently and was not very ambulatory mainly in wheelchair.   Unable to get 18ga angio so no CTA head and neck multiphase done. CT scan head no acute changes.  Risk factors DVT,

## 2017-10-07 NOTE — CONSULTS
[facility]  Vascular Neurology  Comprehensive Stroke Center  Consult Note    Consults Stroke Code  Assessment/Plan:     Patient is a 85 y.o. year old male with:    Encephalopathy, metabolic    Check labs for metabolic cause  Cultures  EEG  MRI Brain w/o contrast  Neuro checks and continue to monitor call for any changes            Thrombolysis Candidate? No  1. Contraindications: Outside of treament window    Interventional Revascularization Candidate?  No    Research Candidate? No    Subjective:     History of Present Illness:  86 y/o male who was last seen normal at 2300 on 10-6-17 when he knew his name and . Stroke code was called at 0600 due to unresponsiveness. Evaluated on CT scan table as patient in bed on way to CT scan. Patient is moving all extremities to painful stimuli and on his own but not opening eyes or verbal. Patient admitted for LE edema and increase in SOB and has DVT on Coumadin. Nurse states that the patient has espisodes of confusion. Had been in a SNF recently and was not very ambulatory mainly in wheelchair.   Unable to get 18ga angio so no CTA head and neck multiphase done. CT scan head no acute changes.  Risk factors DVT,          History reviewed. No pertinent past medical history.  Past Surgical History:   Procedure Laterality Date    BACK SURGERY       Family History   Problem Relation Age of Onset    No Known Problems Mother     Cancer Father      Social History   Substance Use Topics    Smoking status: Former Smoker     Quit date: 1985    Smokeless tobacco: Never Used    Alcohol use 1.2 oz/week     2 Shots of liquor per week     Review of patient's allergies indicates:  No Known Allergies  Medications: I have reviewed the current medication administration record.    Prescriptions Prior to Admission   Medication Sig Dispense Refill Last Dose    [DISCONTINUED] midodrine (PROAMATINE) 10 MG tablet Take 1 tablet (10 mg total) by mouth 3 (three) times  daily. 90 tablet 0 10/1/2017    [DISCONTINUED] tramadol (ULTRAM) 50 mg tablet Take 50 mg by mouth every 6 (six) hours as needed for Pain.   10/1/2017    [DISCONTINUED] fluticasone (FLONASE) 50 mcg/actuation nasal spray SPRAY 2 SPRAYS IN EACH NOSTRIL ONCE DAILY 16 g 2     [DISCONTINUED] ranitidine (ZANTAC) 300 MG tablet   4 More than a month       Review of Systems   Unable to perform ROS: Acuity of condition     Objective:     Vital Signs (Most Recent):  Temp: 98.4 °F (36.9 °C) (10/07/17 0549)  Pulse: 89 (10/07/17 0549)  Resp: 16 (10/07/17 0528)  BP: (!) 177/75 (10/07/17 0549)  SpO2: 98 % (10/07/17 0549)    Vital Signs Range (Last 24H):  Temp:  [96.2 °F (35.7 °C)-98.7 °F (37.1 °C)]   Pulse:  [75-89]   Resp:  [16-18]   BP: (131-187)/(75-86)   SpO2:  [95 %-98 %]     Physical Exam   Constitutional: He appears well-developed and well-nourished.   HENT:   Head: Normocephalic and atraumatic.   Eyes: Pupils are equal, round, and reactive to light.   Neck: Normal range of motion.   Cardiovascular: Normal rate.    Atilio LE edema   Pulmonary/Chest: Effort normal.   Abdominal: Soft.   Neurological: GCS eye subscore is 1 - none. GCS verbal subscore is 1 - none. GCS motor subscore is 5 - localizes pain.   Not responding verbally but will move to painful stimuli all extremities   Skin: Skin is warm and dry.       Neurological Exam:   LOC: does not follow requests and lethargic  Language: Mute  Speech: Mute  Orientation: Mute  Memory: Mute  Visual Fields (CN II): Full  EOM (CN III, IV, VI): Full/intact  Oculocephalics: normal  Pupils (CN III, IV, VI): PERRL  Facial Sensation (CN V): Corneal reflex intact  Facial Movement (CN VII): unable to test  Hearing (CN VIII): unable to test  Gag Reflex (CN IX, X): normal/symmetric  Shoulder/Neck (CN XI): SCM-Left: Not Done ; SCM-Right: Not Done ; Shoulder Shrug: Not done  Tongue (CN XII): to midline  Reflexes: flexor plantar responses bilaterally  Motor*: moves all extremities to painful  stimuli  Cerebellar*: Not testable due to laying in bed  Sensation: intact to light touch, temperature and vibration  Tone: Arm-Left: flaccid; Leg-Left: flaccid; Arm-Right: flaccid; Leg-Right: flaccid    NIH Stroke Scale:  Interval: baseline (upon arrival/admit)  Level of Consciousness: 1 - drowsy  LOC Questions: 2 - answers none correctly  LOC Commands: 2 - performs neither correctly  Best Gaze: 0 - normal  Visual: 0 - no visual loss  Facial Palsy: 0 - normal  Motor Left Arm: 4 - no movement  Motor Right Arm: 4 - no movement  Motor Left Le - no movement  Motor Right Le - no movement  Limb Ataxia: 0 - absent  Sensory: 0 - normal  Best Language: 3 - mute  Dysarthria: 0 - normal articulation  Extinction and Inattention: 0 - no neglect  NIH Stroke Scale Total: 24  [place] Coma Scale:  Best Eye Response: 1 - none  Best Motor Response: 5 - localizes pain  Best Verbal Response: 1 - none  Sandra Coma Scale Total: 7  Modified Bourbon Scale:   Timeline: Prior to symptoms onset  Modified Bourbon Score: 3 - moderate disability        Laboratory:  CMP: No results for input(s): GLUCOSE, CALCIUM, ALBUMIN, PROT, NA, K, CO2, CL, BUN, CREATININE, ALKPHOS, ALT, AST, BILITOT in the last 24 hours.  CBC:   Recent Labs  Lab 10/07/17  0708   WBC 7.19   RBC 3.63*   HGB 11.5*   HCT 34.9*      MCV 96   MCH 31.7*   MCHC 33.0     Lipid Panel: No results for input(s): CHOL, LDLCALC, HDL, TRIG in the last 168 hours.  Coagulation:   Recent Labs  Lab 10/07/17  0453   INR 1.6*     Platelet Aggregation Study: No results for input(s): PLTAGG, PLTAGINTERP, PLTAGREGLACO, ADPPLTAGGREG in the last 168 hours.  Hgb A1C: No results for input(s): HGBA1C in the last 168 hours.  TSH:   Recent Labs  Lab 10/01/17  2206   TSH 2.222       Diagnostic Results:  Brain Imaging:   CT Head w/o contrast 10-7-17 results:  No evidence of acute major vascular distribution infarct or intracranial hemorrhage.     Chronic microvascular ischemic  disease.    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    Cardiac Evaluation:   2D Echo 10-2-17 results:  The left atrial volume index is normal    1 - Normal left ventricular systolic function (EF 60-65%).     2 - No wall motion abnormalities.     3 - Normal left ventricular diastolic function.     4 - Normal right ventricular systolic function .     5 - Trivial to mild tricuspid regurgitation.     6 - Trivial pulmonic regurgitation.     7 - Intermediate central venous pressure.     8 - The estimated PA systolic pressure is 39 mmHg.     EKG/Telemetry: 10-1-17 results:  Normal sinus rhythm  Nonspecific T wave abnormality  Abnormal ECG  When compared with ECG of 31-AUG-2017 21:41,  No significant change was found  Confirmed by Ben Lopez MD ([id number]) on 10/2/2017 3:43:23 PM      Danielle Aviles NP  [facility]  Department of Vascular Neurology   [facility]

## 2017-10-07 NOTE — NURSING TRANSFER
Pt. Moved from 909 to 1108 for airborne precautions.  Pt. Handled transport well.  Family and nurse at bedside.

## 2017-10-07 NOTE — NURSING
Assumed care from Delores Heck RN from 9th floor. BRETT OVALLES. Bed locked, side rails x2 up, call light in reach. Daughter at bedside.

## 2017-10-07 NOTE — ASSESSMENT & PLAN NOTE
Check labs for metabolic cause  Cultures  EEG  MRI Brain w/o contrast  Neuro checks and continue to monitor call for any changes

## 2017-10-07 NOTE — PROGRESS NOTES
Pt. Was sitting up, alert,and talking upon last assessment.  Assessments/ vitals per flowsheet.  Will continue to monitor.

## 2017-10-07 NOTE — SUBJECTIVE & OBJECTIVE
History reviewed. No pertinent past medical history.  Past Surgical History:   Procedure Laterality Date    BACK SURGERY  1961     Family History   Problem Relation Age of Onset    No Known Problems Mother     Cancer Father      Social History   Substance Use Topics    Smoking status: Former Smoker     Quit date: 8/25/1985    Smokeless tobacco: Never Used    Alcohol use 1.2 oz/week     2 Shots of liquor per week     Review of patient's allergies indicates:  No Known Allergies  Medications: I have reviewed the current medication administration record.    Prescriptions Prior to Admission   Medication Sig Dispense Refill Last Dose    [DISCONTINUED] midodrine (PROAMATINE) 10 MG tablet Take 1 tablet (10 mg total) by mouth 3 (three) times daily. 90 tablet 0 10/1/2017    [DISCONTINUED] tramadol (ULTRAM) 50 mg tablet Take 50 mg by mouth every 6 (six) hours as needed for Pain.   10/1/2017    [DISCONTINUED] fluticasone (FLONASE) 50 mcg/actuation nasal spray SPRAY 2 SPRAYS IN EACH NOSTRIL ONCE DAILY 16 g 2     [DISCONTINUED] ranitidine (ZANTAC) 300 MG tablet   4 More than a month       Review of Systems   Unable to perform ROS: Acuity of condition     Objective:     Vital Signs (Most Recent):  Temp: 98.4 °F (36.9 °C) (10/07/17 0549)  Pulse: 89 (10/07/17 0549)  Resp: 16 (10/07/17 0528)  BP: (!) 177/75 (10/07/17 0549)  SpO2: 98 % (10/07/17 0549)    Vital Signs Range (Last 24H):  Temp:  [96.2 °F (35.7 °C)-98.7 °F (37.1 °C)]   Pulse:  [75-89]   Resp:  [16-18]   BP: (131-187)/(75-86)   SpO2:  [95 %-98 %]     Physical Exam   Constitutional: He appears well-developed and well-nourished.   HENT:   Head: Normocephalic and atraumatic.   Eyes: Pupils are equal, round, and reactive to light.   Neck: Normal range of motion.   Cardiovascular: Normal rate.    Atilio LE edema   Pulmonary/Chest: Effort normal.   Abdominal: Soft.   Neurological: GCS eye subscore is 1 - none. GCS verbal subscore is 1 - none. GCS motor subscore is 5 -  localizes pain.   Not responding verbally but will move to painful stimuli all extremities   Skin: Skin is warm and dry.       Neurological Exam:   LOC: does not follow requests and lethargic  Language: Mute  Speech: Mute  Orientation: Mute  Memory: Mute  Visual Fields (CN II): Full  EOM (CN III, IV, VI): Full/intact  Oculocephalics: normal  Pupils (CN III, IV, VI): PERRL  Facial Sensation (CN V): Corneal reflex intact  Facial Movement (CN VII): unable to test  Hearing (CN VIII): unable to test  Gag Reflex (CN IX, X): normal/symmetric  Shoulder/Neck (CN XI): SCM-Left: Not Done ; SCM-Right: Not Done ; Shoulder Shrug: Not done  Tongue (CN XII): to midline  Reflexes: flexor plantar responses bilaterally  Motor*: moves all extremities to painful stimuli  Cerebellar*: Not testable due to laying in bed  Sensation: intact to light touch, temperature and vibration  Tone: Arm-Left: flaccid; Leg-Left: flaccid; Arm-Right: flaccid; Leg-Right: flaccid    NIH Stroke Scale:  Interval: baseline (upon arrival/admit)  Level of Consciousness: 1 - drowsy  LOC Questions: 2 - answers none correctly  LOC Commands: 2 - performs neither correctly  Best Gaze: 0 - normal  Visual: 0 - no visual loss  Facial Palsy: 0 - normal  Motor Left Arm: 4 - no movement  Motor Right Arm: 4 - no movement  Motor Left Le - no movement  Motor Right Le - no movement  Limb Ataxia: 0 - absent  Sensory: 0 - normal  Best Language: 3 - mute  Dysarthria: 0 - normal articulation  Extinction and Inattention: 0 - no neglect  NIH Stroke Scale Total: 24  [place] Coma Scale:  Best Eye Response: 1 - none  Best Motor Response: 5 - localizes pain  Best Verbal Response: 1 - none  Sandra Coma Scale Total: 7  Modified [place]well Scale:   Timeline: Prior to symptoms onset  Modified Anna Marie Score: 3 - moderate disability        Laboratory:  CMP: No results for input(s): GLUCOSE, CALCIUM, ALBUMIN, PROT, NA, K, CO2, CL, BUN, CREATININE, ALKPHOS, ALT, AST, BILITOT in the last 24  hours.  CBC:   Recent Labs  Lab 10/07/17  0708   WBC 7.19   RBC 3.63*   HGB 11.5*   HCT 34.9*      MCV 96   MCH 31.7*   MCHC 33.0     Lipid Panel: No results for input(s): CHOL, LDLCALC, HDL, TRIG in the last 168 hours.  Coagulation:   Recent Labs  Lab 10/07/17  0453   INR 1.6*     Platelet Aggregation Study: No results for input(s): PLTAGG, PLTAGINTERP, PLTAGREGLACO, ADPPLTAGGREG in the last 168 hours.  Hgb A1C: No results for input(s): HGBA1C in the last 168 hours.  TSH:   Recent Labs  Lab 10/01/17  2206   TSH 2.222       Diagnostic Results:  Brain Imaging:   CT Head w/o contrast 10-7-17 results:  No evidence of acute major vascular distribution infarct or intracranial hemorrhage.     Chronic microvascular ischemic disease.    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    Cardiac Evaluation:   2D Echo 10-2-17 results:  The left atrial volume index is normal    1 - Normal left ventricular systolic function (EF 60-65%).     2 - No wall motion abnormalities.     3 - Normal left ventricular diastolic function.     4 - Normal right ventricular systolic function .     5 - Trivial to mild tricuspid regurgitation.     6 - Trivial pulmonic regurgitation.     7 - Intermediate central venous pressure.     8 - The estimated PA systolic pressure is 39 mmHg.     EKG/Telemetry: 10-1-17 results:  Normal sinus rhythm  Nonspecific T wave abnormality  Abnormal ECG  When compared with ECG of 31-AUG-2017 21:41,  No significant change was found  Confirmed by Jessica PERSAUD, Ben ([id number]) on 10/2/2017 3:43:23 PM

## 2017-10-07 NOTE — HOSPITAL COURSE
86 y/o male who was LSN 10-6-17 at 2300 was unresponsive. Is responding to painful stimuli all extremities and moving all on his own. CT head no acute changes. Would look for metabolic cause of encephalopathy, may do MRI brain for completion as unable to get CTA head, labs, cultures, could get EEG to check for possible seizure activity.  Discussed with Dr Pierce

## 2017-10-07 NOTE — PROGRESS NOTES
IV access has been lost.  Team has been paged.  2 failed attempts.  PICC team has been called and message left.  PICC consult initiated.  Will continue to monitor

## 2017-10-08 LAB
ANION GAP SERPL CALC-SCNC: 8 MMOL/L
BUN SERPL-MCNC: 14 MG/DL
CALCIUM SERPL-MCNC: 8.8 MG/DL
CHLORIDE SERPL-SCNC: 107 MMOL/L
CO2 SERPL-SCNC: 25 MMOL/L
CREAT SERPL-MCNC: 0.9 MG/DL
EST. GFR  (AFRICAN AMERICAN): >60 ML/MIN/1.73 M^2
EST. GFR  (NON AFRICAN AMERICAN): >60 ML/MIN/1.73 M^2
GLUCOSE SERPL-MCNC: 89 MG/DL
INR PPP: 1.8
POTASSIUM SERPL-SCNC: 3.9 MMOL/L
PROTHROMBIN TIME: 18.1 SEC
SODIUM SERPL-SCNC: 140 MMOL/L

## 2017-10-08 PROCEDURE — 80048 BASIC METABOLIC PNL TOTAL CA: CPT

## 2017-10-08 PROCEDURE — 95816 EEG AWAKE AND DROWSY: CPT

## 2017-10-08 PROCEDURE — 11000001 HC ACUTE MED/SURG PRIVATE ROOM

## 2017-10-08 PROCEDURE — 36415 COLL VENOUS BLD VENIPUNCTURE: CPT

## 2017-10-08 PROCEDURE — 63600175 PHARM REV CODE 636 W HCPCS: Performed by: PHYSICIAN ASSISTANT

## 2017-10-08 PROCEDURE — 25000003 PHARM REV CODE 250: Performed by: PHYSICIAN ASSISTANT

## 2017-10-08 PROCEDURE — 85610 PROTHROMBIN TIME: CPT

## 2017-10-08 PROCEDURE — 99232 SBSQ HOSP IP/OBS MODERATE 35: CPT | Mod: ,,, | Performed by: PHYSICIAN ASSISTANT

## 2017-10-08 PROCEDURE — 95816 EEG AWAKE AND DROWSY: CPT | Mod: 26,,, | Performed by: PSYCHIATRY & NEUROLOGY

## 2017-10-08 RX ORDER — CARBIDOPA AND LEVODOPA 25; 100 MG/1; MG/1
1 TABLET ORAL 3 TIMES DAILY
Status: DISCONTINUED | OUTPATIENT
Start: 2017-10-08 | End: 2017-10-10 | Stop reason: HOSPADM

## 2017-10-08 RX ADMIN — WARFARIN SODIUM 7.5 MG: 2.5 TABLET ORAL at 04:10

## 2017-10-08 RX ADMIN — STANDARDIZED SENNA CONCENTRATE AND DOCUSATE SODIUM 1 TABLET: 8.6; 5 TABLET, FILM COATED ORAL at 08:10

## 2017-10-08 RX ADMIN — ENOXAPARIN SODIUM 100 MG: 100 INJECTION SUBCUTANEOUS at 09:10

## 2017-10-08 RX ADMIN — MIDODRINE HYDROCHLORIDE 5 MG: 5 TABLET ORAL at 09:10

## 2017-10-08 RX ADMIN — CARBIDOPA AND LEVODOPA 1 TABLET: 25; 100 TABLET ORAL at 03:10

## 2017-10-08 RX ADMIN — CARBIDOPA AND LEVODOPA 1 TABLET: 25; 100 TABLET ORAL at 09:10

## 2017-10-08 RX ADMIN — STANDARDIZED SENNA CONCENTRATE AND DOCUSATE SODIUM 1 TABLET: 8.6; 5 TABLET, FILM COATED ORAL at 09:10

## 2017-10-08 RX ADMIN — ENOXAPARIN SODIUM 100 MG: 100 INJECTION SUBCUTANEOUS at 08:10

## 2017-10-08 NOTE — PROGRESS NOTES
[facility] Hospital Medicine  Progress Note    Patient Name: Tona Dey  MRN: [id number]  Patient Class: IP- Inpatient   Admission Date: 10/1/2017  Length of Stay: 6 days  Attending Physician: Aracelis Langston MD  Primary Care Provider: Primary Doctor [facility] Medicine Team: [facility] HOSP MED E Ashlie Bush PA-C    Subjective:     Principal Problem:Acute deep vein thrombosis (DVT) of popliteal vein of left lower extremity    HPI:  Patient is an 85 year old gentleman with a h/o autonomic instability.  Patient is a poor historian.  He presents with worsening SOB and BLE swelling.  Patient was admitted to a SNF until two days ago (secondary to shoulder injury), but states that he spent most of his admission there in a wheelchair.  He was previously able to walk about 100ft before getting SOB, but now has HENSON when walking across a room.  Denies abdominal distension, chest pain, dizziness, palpitations, fever/chills, N/V, pain to BLE, cough.    Hospital Course:  Pt admitted to observation for LE edema. CXR unremarkable. BNP WNL. IV lasix 40 mg BID. 2d shows pEF without wall motion abnormalities. BLE US shows occlusive DVT within L popliteal. Coumadin/lovenox bridge started. Upgraded to inpatient for further management. PT/OT rec SNF. Referrals to be placed by . Discharged pending SNF. Per , pt has been medically accepted to [facility] and awaiting auth from [facility].    Interval History: Patient sitting up in bed, awake and alert. He notes some occasional dizziness and expresses wish to talk with wife.     Review of Systems   Constitutional: Positive for activity change. Negative for appetite change, chills, diaphoresis, fatigue, fever and unexpected weight change.   Respiratory: Negative for cough, choking, chest tightness, shortness of breath and wheezing.    Cardiovascular: Positive for leg swelling. Negative for chest pain and palpitations.   Gastrointestinal: Negative for  abdominal distention, abdominal pain, constipation, diarrhea, nausea and vomiting.   Musculoskeletal: Positive for gait problem. Negative for arthralgias, back pain, joint swelling and myalgias.   Neurological: Positive for dizziness and weakness. Negative for light-headedness and numbness.   Psychiatric/Behavioral: Negative for agitation, confusion, hallucinations and suicidal ideas.     Objective:     Vital Signs (Most Recent):  Temp: 97.9 °F (36.6 °C) (10/08/17 1513)  Pulse: 84 (10/08/17 1513)  Resp: 16 (10/08/17 1513)  BP: 138/79 (10/08/17 1513)  SpO2: 98 % (10/08/17 1513) Vital Signs (24h Range):  Temp:  [96.8 °F (36 °C)-98.3 °F (36.8 °C)] 97.9 °F (36.6 °C)  Pulse:  [77-89] 84  Resp:  [16-20] 16  SpO2:  [93 %-98 %] 98 %  BP: (118-156)/(58-79) 138/79     Weight: 91.5 kg (201 lb 11.5 oz)  Body mass index is 30.67 kg/m².  No intake or output data in the 24 hours ending 10/08/17 1819   Physical Exam   Constitutional: He appears well-developed and well-nourished. No distress.   Neck: Carotid bruit is not present.   Cardiovascular: Normal rate and regular rhythm.    No murmur heard.  Pulmonary/Chest: Effort normal and breath sounds normal. No respiratory distress. He has no wheezes.   Abdominal: Soft. Bowel sounds are normal. He exhibits no distension. There is no splenomegaly or hepatomegaly. There is no tenderness.   Musculoskeletal: Normal range of motion. He exhibits edema (nonpitting ).   Neurological: He is alert. No cranial nerve deficit.   Oriented to person and place but not time   Skin: Skin is warm and dry. No rash noted. He is not diaphoretic. No erythema.   Psychiatric: He has a normal mood and affect. His behavior is normal.   Nursing note and vitals reviewed.    Significant Labs:   BMP:   Recent Labs  Lab 10/07/17  0708 10/08/17  0436   GLU 84 89    140   K 4.2 3.9    107   CO2 25 25   BUN 11 14   CREATININE 0.9 0.9   CALCIUM 9.0 8.8   MG 2.2  --      CBC:   Recent Labs  Lab 10/07/17  0780    WBC 7.19   HGB 11.5*   HCT 34.9*        All pertinent labs within the past 24 hours have been reviewed.      Assessment/Plan:      * Acute deep vein thrombosis (DVT) of popliteal vein of left lower extremity    - BLE US reveals DVT in L popliteal  - Discussed risks/benefits of anticoagulation with wife and patient; started coumadin/lovenox bridge  - INR daily; pharm D following  - INR 1.8 and continue current coumadin coumadin dose  - Placed referral to coumadin clinic        Encephalopathy, metabolic    - altered mental status overnight 10/6 and returned to baseline. ? Related to tramadol use vs sundowning.   - EEG and MRI brain pending  - NV consulted           Physical deconditioning    - Patient reports decreased ambulation while at SNF.  - Will consult PT/OT, SW.  - Fall precautions, ambulate with assistance, incentive spirometry.  - PT/OT rec SNF  10/3: Wife agreeable to SNF. SW to place referrals.   10/4: Denied [facility]  10/5: SW placed more referrals for SNF  Discharged pending SNF. Per SW, pt has been medically accepted to [facility] and awaiting auth from [facility].        Lower extremity edema    - BNP 70, no echo on file.  CXR unremarkable.  - Patient received Lasix 40mg IV in ER  - Albumin 2.9.  If no improvement with Lasix, consider adding albumin.  - Patient would likely benefit from compression stockings on discharge, considering h/o autonomic instability and BLE edema.  - Strict I&Os, daily weights, cardiac diet.  Elevate BLE.  - 2d shows pEF without wall motion abnormalities  - BLE edema improved with IV lasix. Will continue to monitor         Autonomic instability    - Decreased midodrine to 5mg TID with parameters and monitoring            VTE Risk Mitigation         Ordered     warfarin tablet 7.5 mg  Daily     Route:  Oral        10/05/17 1001     enoxaparin injection 100 mg  Every 12 hours (non-standard times)     Route:  Subcutaneous        10/02/17 0839     Medium Risk  of VTE  Once      10/02/17 0230     Place TIGRE hose  Until discontinued      10/02/17 0230     Place sequential compression device  Until discontinued      10/02/17 0230              Ashlie Bush PA-C  Department of Hospital Medicine   [facility]

## 2017-10-08 NOTE — ASSESSMENT & PLAN NOTE
- BLE US to rule out DVTs as patient reports decreased ambulation  - US reveals DVT in L popliteal  - Discussed risks/benefits of anticoagulation with wife and patient; started coumadin/lovenox bridge  - INR daily; pharm D following  - INR 1.6 (from 1.3) and continue current coumadin coumadin dose  - Placed referral to coumadin clinic

## 2017-10-08 NOTE — PLAN OF CARE
Problem: Patient Care Overview  Goal: Plan of Care Review  Outcome: Ongoing (interventions implemented as appropriate)  Patient remained free of falls and injury during shift. Patient took medications without complaint.  Spouse of patient stated that patient requires assistance with eating and to make sure he gets it.  No other concerns noted.

## 2017-10-08 NOTE — PROGRESS NOTES
[facility] Hospital Medicine  Progress Note    Patient Name: Tona Dey  MRN: [id number]  Patient Class: IP- Inpatient   Admission Date: 10/1/2017  Length of Stay: 5 days  Attending Physician: Aracelis Langston MD  Primary Care Provider: Primary Doctor [facility] Medicine Team: [facility] Ashlie Bush PA-C    Subjective:     Principal Problem:Acute deep vein thrombosis (DVT) of popliteal vein of left lower extremity    HPI:  Patient is an 85 year old gentleman with a h/o autonomic instability.  Patient is a poor historian.  He presents with worsening SOB and BLE swelling.  Patient was admitted to a SNF until two days ago (secondary to shoulder injury), but states that he spent most of his admission there in a wheelchair.  He was previously able to walk about 100ft before getting SOB, but now has HENSON when walking across a room.  Denies abdominal distension, chest pain, dizziness, palpitations, fever/chills, N/V, pain to BLE, cough.    Hospital Course:  Pt admitted to observation for LE edema. CXR unremarkable. BNP WNL. IV lasix 40 mg BID. 2d shows pEF without wall motion abnormalities. BLE US shows occlusive DVT within L popliteal. Coumadin/lovenox bridge started. Upgraded to inpatient for further management. PT/OT rec SNF. Referrals to be placed by . Discharged pending SNF. Per , pt has been medically accepted to [facility] and awaiting auth from [facility].    Interval History: Noted overnight to be altered and difficult to wake. Wife at bedside this AM. Patient appears back at baseline and able to answer questions appropriately.      Review of Systems   Constitutional: Positive for activity change. Negative for appetite change, chills, diaphoresis, fatigue, fever and unexpected weight change.   Respiratory: Negative for cough, choking, chest tightness, shortness of breath and wheezing.    Cardiovascular: Positive for leg swelling. Negative for chest pain and  palpitations.   Gastrointestinal: Negative for abdominal distention, abdominal pain, constipation, diarrhea, nausea and vomiting.   Musculoskeletal: Positive for gait problem. Negative for arthralgias, back pain, joint swelling and myalgias.   Neurological: Positive for weakness. Negative for dizziness, light-headedness and numbness.   Psychiatric/Behavioral: Negative for agitation, confusion, hallucinations and suicidal ideas.     Objective:     Vital Signs (Most Recent):  Temp: 98 °F (36.7 °C) (10/07/17 1943)  Pulse: 80 (10/07/17 1943)  Resp: 17 (10/07/17 1943)  BP: (!) 118/58 (10/07/17 1943)  SpO2: 97 % (10/07/17 1943) Vital Signs (24h Range):  Temp:  [96.8 °F (36 °C)-98.7 °F (37.1 °C)] 98 °F (36.7 °C)  Pulse:  [] 80  Resp:  [16-18] 17  SpO2:  [84 %-98 %] 97 %  BP: (115-184)/(55-82) 118/58     Weight: 91.5 kg (201 lb 11.5 oz)  Body mass index is 30.67 kg/m².    Intake/Output Summary (Last 24 hours) at 10/07/17 2023  Last data filed at 10/07/17 0528   Gross per 24 hour   Intake              700 ml   Output                0 ml   Net              700 ml      Physical Exam   Constitutional: He appears well-developed and well-nourished. No distress.   Neck: Carotid bruit is not present.   Cardiovascular: Normal rate and regular rhythm.    No murmur heard.  Pulmonary/Chest: Effort normal and breath sounds normal. No respiratory distress. He has no wheezes.   Abdominal: Soft. Bowel sounds are normal. He exhibits no distension. There is no splenomegaly or hepatomegaly. There is no tenderness.   Musculoskeletal: Normal range of motion. He exhibits edema (nonpitting ).   Neurological: He is alert. No cranial nerve deficit.   Oriented to person and place but not time   Skin: Skin is warm and dry. No rash noted. He is not diaphoretic. No erythema.   Psychiatric: He has a normal mood and affect. His behavior is normal.   Nursing note and vitals reviewed.    Significant Labs:   CBC:   Recent Labs  Lab 10/07/17  0708    WBC 7.19   HGB 11.5*   HCT 34.9*        CMP:   Recent Labs  Lab 10/07/17  0708      K 4.2      CO2 25   GLU 84   BUN 11   CREATININE 0.9   CALCIUM 9.0   PROT 6.7   ALBUMIN 3.0*   BILITOT 1.0   ALKPHOS 59   AST 44*   ALT 45*   ANIONGAP 8   EGFRNONAA >60.0     All pertinent labs within the past 24 hours have been reviewed.    Assessment/Plan:      * Acute deep vein thrombosis (DVT) of popliteal vein of left lower extremity    - BLE US to rule out DVTs as patient reports decreased ambulation  - US reveals DVT in L popliteal  - Discussed risks/benefits of anticoagulation with wife and patient; started coumadin/lovenox bridge  - INR daily; pharm D following  - INR 1.6 (from 1.3) and continue current coumadin coumadin dose  - Placed referral to coumadin clinic        Encephalopathy, metabolic    - altered mental status overnight 10/6 and returned to baseline. ? Related to tramadol use vs sundowning.   - EEG and MRI brain pending  - NV consulted           Physical deconditioning    - Patient reports decreased ambulation while at SNF.  - Will consult PT/OT, SW.  - Fall precautions, ambulate with assistance, incentive spirometry.  - PT/OT rec SNF  10/3: Wife agreeable to SNF. SW to place referrals.   10/4: Denied [facility]  10/5: SW placed more referrals for SNF  Discharged pending SNF. Per SW, pt has been medically accepted to [facility] and awaiting auth from [facility].        Lower extremity edema    - BNP 70, no echo on file.  CXR unremarkable.  - Patient received Lasix 40mg IV in ER  - Albumin 2.9.  If no improvement with Lasix, consider adding albumin.  - Patient would likely benefit from compression stockings on discharge, considering h/o autonomic instability and BLE edema.  - Strict I&Os, daily weights, cardiac diet.  Elevate BLE.  - 2d shows pEF without wall motion abnormalities  - BLE edema improved with IV lasix. Will continue to monitor         Autonomic instability    -  Decreased midodrine to 5mg TID with parameters and monitoring            VTE Risk Mitigation         Ordered     warfarin tablet 7.5 mg  Daily     Route:  Oral        10/05/17 1001     enoxaparin injection 100 mg  Every 12 hours (non-standard times)     Route:  Subcutaneous        10/02/17 0839     Medium Risk of VTE  Once      10/02/17 0230     Place TIGRE hose  Until discontinued      10/02/17 0230     Place sequential compression device  Until discontinued      10/02/17 0230          Ashlie Bush PA-C  Department of Hospital Medicine   [facility]

## 2017-10-08 NOTE — PLAN OF CARE
Problem: Patient Care Overview  Goal: Plan of Care Review  Outcome: Ongoing (interventions implemented as appropriate)  Pt turned q2 to prevent skin breakdown. Pt free from falls or injury throughout shift. [employer] at bedside. Pt instructed to call for assistance. Bed alarm on, bed locked, side rails upx2, call light within reach. Will continue to monitor.

## 2017-10-08 NOTE — ASSESSMENT & PLAN NOTE
- Patient reports decreased ambulation while at SNF.  - Will consult PT/OT, SW.  - Fall precautions, ambulate with assistance, incentive spirometry.  - PT/OT rec SNF  10/3: Wife agreeable to SNF. SW to place referrals.   10/4: Denied [facility]  10/5: SW placed more referrals for SNF  Discharged pending SNF. Per SW, pt has been medically accepted to [facility] and awaiting auth from N.

## 2017-10-08 NOTE — SUBJECTIVE & OBJECTIVE
Interval History: Patient sitting up in bed, awake and alert. He notes some occasional dizziness and expresses wish to talk with wife.     Review of Systems   Constitutional: Positive for activity change. Negative for appetite change, chills, diaphoresis, fatigue, fever and unexpected weight change.   Respiratory: Negative for cough, choking, chest tightness, shortness of breath and wheezing.    Cardiovascular: Positive for leg swelling. Negative for chest pain and palpitations.   Gastrointestinal: Negative for abdominal distention, abdominal pain, constipation, diarrhea, nausea and vomiting.   Musculoskeletal: Positive for gait problem. Negative for arthralgias, back pain, joint swelling and myalgias.   Neurological: Positive for dizziness and weakness. Negative for light-headedness and numbness.   Psychiatric/Behavioral: Negative for agitation, confusion, hallucinations and suicidal ideas.     Objective:     Vital Signs (Most Recent):  Temp: 97.9 °F (36.6 °C) (10/08/17 1513)  Pulse: 84 (10/08/17 1513)  Resp: 16 (10/08/17 1513)  BP: 138/79 (10/08/17 1513)  SpO2: 98 % (10/08/17 1513) Vital Signs (24h Range):  Temp:  [96.8 °F (36 °C)-98.3 °F (36.8 °C)] 97.9 °F (36.6 °C)  Pulse:  [77-89] 84  Resp:  [16-20] 16  SpO2:  [93 %-98 %] 98 %  BP: (118-156)/(58-79) 138/79     Weight: 91.5 kg (201 lb 11.5 oz)  Body mass index is 30.67 kg/m².  No intake or output data in the 24 hours ending 10/08/17 1819   Physical Exam   Constitutional: He appears well-developed and well-nourished. No distress.   Neck: Carotid bruit is not present.   Cardiovascular: Normal rate and regular rhythm.    No murmur heard.  Pulmonary/Chest: Effort normal and breath sounds normal. No respiratory distress. He has no wheezes.   Abdominal: Soft. Bowel sounds are normal. He exhibits no distension. There is no splenomegaly or hepatomegaly. There is no tenderness.   Musculoskeletal: Normal range of motion. He exhibits edema (nonpitting ).   Neurological: He  is alert. No cranial nerve deficit.   Oriented to person and place but not time   Skin: Skin is warm and dry. No rash noted. He is not diaphoretic. No erythema.   Psychiatric: He has a normal mood and affect. His behavior is normal.   Nursing note and vitals reviewed.    Significant Labs:   BMP:   Recent Labs  Lab 10/07/17  0708 10/08/17  0436   GLU 84 89    140   K 4.2 3.9    107   CO2 25 25   BUN 11 14   CREATININE 0.9 0.9   CALCIUM 9.0 8.8   MG 2.2  --      CBC:   Recent Labs  Lab 10/07/17  0708   WBC 7.19   HGB 11.5*   HCT 34.9*        All pertinent labs within the past 24 hours have been reviewed.

## 2017-10-08 NOTE — ASSESSMENT & PLAN NOTE
- BLE US reveals DVT in L popliteal  - Discussed risks/benefits of anticoagulation with wife and patient; started coumadin/lovenox bridge  - INR daily; pharm D following  - INR 1.8 and continue current coumadin coumadin dose  - Placed referral to coumadin clinic

## 2017-10-08 NOTE — ASSESSMENT & PLAN NOTE
- altered mental status overnight 10/6 and returned to baseline. ? Related to tramadol use vs sundowning.   - EEG and MRI brain pending  - NV consulted

## 2017-10-08 NOTE — SUBJECTIVE & OBJECTIVE
Interval History: Noted overnight to be altered and difficult to wake. Wife at bedside this AM. Patient appears back at baseline and able to answer questions appropriately.      Review of Systems   Constitutional: Positive for activity change. Negative for appetite change, chills, diaphoresis, fatigue, fever and unexpected weight change.   Respiratory: Negative for cough, choking, chest tightness, shortness of breath and wheezing.    Cardiovascular: Positive for leg swelling. Negative for chest pain and palpitations.   Gastrointestinal: Negative for abdominal distention, abdominal pain, constipation, diarrhea, nausea and vomiting.   Musculoskeletal: Positive for gait problem. Negative for arthralgias, back pain, joint swelling and myalgias.   Neurological: Positive for weakness. Negative for dizziness, light-headedness and numbness.   Psychiatric/Behavioral: Negative for agitation, confusion, hallucinations and suicidal ideas.     Objective:     Vital Signs (Most Recent):  Temp: 98 °F (36.7 °C) (10/07/17 1943)  Pulse: 80 (10/07/17 1943)  Resp: 17 (10/07/17 1943)  BP: (!) 118/58 (10/07/17 1943)  SpO2: 97 % (10/07/17 1943) Vital Signs (24h Range):  Temp:  [96.8 °F (36 °C)-98.7 °F (37.1 °C)] 98 °F (36.7 °C)  Pulse:  [] 80  Resp:  [16-18] 17  SpO2:  [84 %-98 %] 97 %  BP: (115-184)/(55-82) 118/58     Weight: 91.5 kg (201 lb 11.5 oz)  Body mass index is 30.67 kg/m².    Intake/Output Summary (Last 24 hours) at 10/07/17 2023  Last data filed at 10/07/17 0528   Gross per 24 hour   Intake              700 ml   Output                0 ml   Net              700 ml      Physical Exam   Constitutional: He appears well-developed and well-nourished. No distress.   Neck: Carotid bruit is not present.   Cardiovascular: Normal rate and regular rhythm.    No murmur heard.  Pulmonary/Chest: Effort normal and breath sounds normal. No respiratory distress. He has no wheezes.   Abdominal: Soft. Bowel sounds are normal. He exhibits no  distension. There is no splenomegaly or hepatomegaly. There is no tenderness.   Musculoskeletal: Normal range of motion. He exhibits edema (nonpitting ).   Neurological: He is alert. No cranial nerve deficit.   Oriented to person and place but not time   Skin: Skin is warm and dry. No rash noted. He is not diaphoretic. No erythema.   Psychiatric: He has a normal mood and affect. His behavior is normal.   Nursing note and vitals reviewed.    Significant Labs:   CBC:   Recent Labs  Lab 10/07/17  0708   WBC 7.19   HGB 11.5*   HCT 34.9*        CMP:   Recent Labs  Lab 10/07/17  0708      K 4.2      CO2 25   GLU 84   BUN 11   CREATININE 0.9   CALCIUM 9.0   PROT 6.7   ALBUMIN 3.0*   BILITOT 1.0   ALKPHOS 59   AST 44*   ALT 45*   ANIONGAP 8   EGFRNONAA >60.0     All pertinent labs within the past 24 hours have been reviewed.

## 2017-10-08 NOTE — ASSESSMENT & PLAN NOTE
- Patient reports decreased ambulation while at SNF.  - Will consult PT/OT, SW.  - Fall precautions, ambulate with assistance, incentive spirometry.  - PT/OT rec SNF  10/3: Wife agreeable to SNF. SW to place referrals.   10/4: Denied [facility]  10/5: SW placed more referrals for SNF  Discharged pending SNF. Per SW, pt has been medically accepted to [facility] and awaiting auth from N.

## 2017-10-09 PROBLEM — G93.41 ENCEPHALOPATHY, METABOLIC: Status: RESOLVED | Noted: 2017-10-07 | Resolved: 2017-10-09

## 2017-10-09 LAB
ANION GAP SERPL CALC-SCNC: 10 MMOL/L
BUN SERPL-MCNC: 15 MG/DL
CALCIUM SERPL-MCNC: 9 MG/DL
CHLORIDE SERPL-SCNC: 107 MMOL/L
CO2 SERPL-SCNC: 21 MMOL/L
CREAT SERPL-MCNC: 0.9 MG/DL
EST. GFR  (AFRICAN AMERICAN): >60 ML/MIN/1.73 M^2
EST. GFR  (NON AFRICAN AMERICAN): >60 ML/MIN/1.73 M^2
GLUCOSE SERPL-MCNC: 88 MG/DL
INR PPP: 2.1
MAGNESIUM SERPL-MCNC: 2.1 MG/DL
POTASSIUM SERPL-SCNC: 3.9 MMOL/L
PROTHROMBIN TIME: 21.5 SEC
SODIUM SERPL-SCNC: 138 MMOL/L

## 2017-10-09 PROCEDURE — 11000001 HC ACUTE MED/SURG PRIVATE ROOM

## 2017-10-09 PROCEDURE — 83735 ASSAY OF MAGNESIUM: CPT

## 2017-10-09 PROCEDURE — 97110 THERAPEUTIC EXERCISES: CPT

## 2017-10-09 PROCEDURE — 25000003 PHARM REV CODE 250: Performed by: PHYSICIAN ASSISTANT

## 2017-10-09 PROCEDURE — 86480 TB TEST CELL IMMUN MEASURE: CPT

## 2017-10-09 PROCEDURE — 99232 SBSQ HOSP IP/OBS MODERATE 35: CPT | Mod: ,,, | Performed by: PHYSICIAN ASSISTANT

## 2017-10-09 PROCEDURE — 80048 BASIC METABOLIC PNL TOTAL CA: CPT

## 2017-10-09 PROCEDURE — 97530 THERAPEUTIC ACTIVITIES: CPT

## 2017-10-09 PROCEDURE — 36415 COLL VENOUS BLD VENIPUNCTURE: CPT

## 2017-10-09 PROCEDURE — 85610 PROTHROMBIN TIME: CPT

## 2017-10-09 RX ORDER — WARFARIN SODIUM 5 MG/1
5 TABLET ORAL DAILY
Status: DISCONTINUED | OUTPATIENT
Start: 2017-10-09 | End: 2017-10-10 | Stop reason: HOSPADM

## 2017-10-09 RX ORDER — WARFARIN SODIUM 5 MG/1
5 TABLET ORAL DAILY
Qty: 30 TABLET | Refills: 0
Start: 2017-10-09 | End: 2017-10-14

## 2017-10-09 RX ORDER — CARBIDOPA AND LEVODOPA 25; 100 MG/1; MG/1
1 TABLET ORAL 3 TIMES DAILY
Qty: 90 TABLET | Refills: 11
Start: 2017-10-09 | End: 2017-10-14

## 2017-10-09 RX ADMIN — BENZONATATE 100 MG: 100 CAPSULE ORAL at 08:10

## 2017-10-09 RX ADMIN — CARBIDOPA AND LEVODOPA 1 TABLET: 25; 100 TABLET ORAL at 09:10

## 2017-10-09 RX ADMIN — STANDARDIZED SENNA CONCENTRATE AND DOCUSATE SODIUM 1 TABLET: 8.6; 5 TABLET, FILM COATED ORAL at 08:10

## 2017-10-09 RX ADMIN — CARBIDOPA AND LEVODOPA 1 TABLET: 25; 100 TABLET ORAL at 05:10

## 2017-10-09 RX ADMIN — CARBIDOPA AND LEVODOPA 1 TABLET: 25; 100 TABLET ORAL at 02:10

## 2017-10-09 RX ADMIN — BENZONATATE 100 MG: 100 CAPSULE ORAL at 03:10

## 2017-10-09 RX ADMIN — WARFARIN SODIUM 5 MG: 5 TABLET ORAL at 04:10

## 2017-10-09 RX ADMIN — RAMELTEON 8 MG: 8 TABLET, FILM COATED ORAL at 08:10

## 2017-10-09 NOTE — PLAN OF CARE
Problem: Occupational Therapy Goal  Goal: Occupational Therapy Goal  Goals to be met by: 10/18/17     Patient will increase functional independence with ADLs by performing:    REVISED:    UE Dressing with Minimal Assistance.  Grooming while standing with Minimal Assistance.  Toileting from bedside commode with Moderate Assistance for hygiene and clothing management.   Supine to sit with Stand-by Assistance.  Stand pivot transfers with Minimal Assistance.    Outcome: Ongoing (interventions implemented as appropriate)  Con't POC.    DANIS Martin

## 2017-10-09 NOTE — SUBJECTIVE & OBJECTIVE
Interval History: No acute events overnight. Patient was working with PT this AM and getting a bath at time of exam. Awake and alert, no new complaints.   Spoke with SW and patient awaiting N authorization for placement in [place] SNF    Review of Systems   Constitutional: Positive for activity change. Negative for appetite change, chills, diaphoresis, fatigue, fever and unexpected weight change.   Respiratory: Negative for cough, choking, chest tightness, shortness of breath and wheezing.    Cardiovascular: Positive for leg swelling (improved). Negative for chest pain and palpitations.   Gastrointestinal: Negative for abdominal distention, abdominal pain, constipation, diarrhea, nausea and vomiting.   Musculoskeletal: Positive for gait problem. Negative for arthralgias, back pain, joint swelling and myalgias.   Neurological: Positive for weakness. Negative for dizziness, light-headedness and numbness.   Psychiatric/Behavioral: Negative for agitation, confusion, hallucinations and suicidal ideas.     Objective:     Vital Signs (Most Recent):  Temp: 97.7 °F (36.5 °C) (10/09/17 1118)  Pulse: 87 (10/09/17 1136)  Resp: 17 (10/09/17 1118)  BP: (!) 169/78 (10/09/17 1118)  SpO2: 95 % (10/09/17 1118) Vital Signs (24h Range):  Temp:  [96.4 °F (35.8 °C)-98.5 °F (36.9 °C)] 97.7 °F (36.5 °C)  Pulse:  [] 87  Resp:  [16-20] 17  SpO2:  [95 %-98 %] 95 %  BP: (129-169)/(69-94) 169/78     Weight: 91.5 kg (201 lb 11.5 oz)  Body mass index is 30.67 kg/m².    Intake/Output Summary (Last 24 hours) at 10/09/17 1518  Last data filed at 10/09/17 1300   Gross per 24 hour   Intake              440 ml   Output                1 ml   Net              439 ml      Physical Exam   Constitutional: He appears well-developed and well-nourished. No distress.   Neck: Carotid bruit is not present.   Cardiovascular: Normal rate and regular rhythm.    No murmur heard.  Pulmonary/Chest: Effort normal and breath sounds normal. No respiratory distress. He  has no wheezes.   Abdominal: Soft. Bowel sounds are normal. He exhibits no distension. There is no splenomegaly or hepatomegaly. There is no tenderness.   Musculoskeletal: Normal range of motion. He exhibits edema (trace - 1+ and nonpitting).   Neurological: He is alert. No cranial nerve deficit.   Oriented to person and place but not time   Skin: Skin is warm and dry. No rash noted. He is not diaphoretic. No erythema.   Psychiatric: He has a normal mood and affect. His behavior is normal.   Nursing note and vitals reviewed.    Significant Labs:   BMP:   Recent Labs  Lab 10/09/17  0422   GLU 88      K 3.9      CO2 21*   BUN 15   CREATININE 0.9   CALCIUM 9.0   MG 2.1     CBC: No results for input(s): WBC, HGB, HCT, PLT in the last 48 hours.  All pertinent labs within the past 24 hours have been reviewed.

## 2017-10-09 NOTE — PLAN OF CARE
Problem: Patient Care Overview  Goal: Plan of Care Review  Outcome: Ongoing (interventions implemented as appropriate)  Patient remained free of falls and injury during shift. Patient took medications without complaint. Patient remained confused to place and situation during shift and attempted many times to crawl out of bed.  No other concerns noted.

## 2017-10-09 NOTE — PROGRESS NOTES
[facility] Hospital Medicine  Progress Note    Patient Name: Tona Dey  MRN: [id number]  Patient Class: IP- Inpatient   Admission Date: 10/1/2017  Length of Stay: 7 days  Attending Physician: Aracelis Langston MD  Primary Care Provider: Primary Doctor [facility] Medicine Team: [facility] MED E Ashlie Bush PA-C    Subjective:     Principal Problem:Acute deep vein thrombosis (DVT) of popliteal vein of left lower extremity    HPI:  Patient is an 85 year old gentleman with a h/o autonomic instability.  Patient is a poor historian.  He presents with worsening SOB and BLE swelling.  Patient was admitted to a SNF until two days ago (secondary to shoulder injury), but states that he spent most of his admission there in a wheelchair.  He was previously able to walk about 100ft before getting SOB, but now has HENSON when walking across a room.  Denies abdominal distension, chest pain, dizziness, palpitations, fever/chills, N/V, pain to BLE, cough.    Hospital Course:  Pt admitted to observation for LE edema. CXR unremarkable. BNP WNL. IV lasix 40 mg BID and swelling improved. TTE shows pEF without wall motion abnormalities. BLE US shows occlusive DVT within L popliteal. Coumadin/lovenox bridge started and lovenox d/c'd once INR at goal (2.0-3.0). Upgraded to inpatient for further management. PT/OT following [facility] SNF. Referrals to be placed by . Discharged pending SNF. Per , pt has been medically accepted to facilities and awaiting auth from [facility].     Interval History: No acute events overnight. Patient was working with PT this AM and getting a bath at time of exam. Awake and alert, no new complaints.   Spoke with SW and patient awaiting [facility] authorization for placement in [place] SNF    Review of Systems   Constitutional: Positive for activity change. Negative for appetite change, chills, diaphoresis, fatigue, fever and unexpected weight change.   Respiratory: Negative for cough, choking, chest  tightness, shortness of breath and wheezing.    Cardiovascular: Positive for leg swelling (improved). Negative for chest pain and palpitations.   Gastrointestinal: Negative for abdominal distention, abdominal pain, constipation, diarrhea, nausea and vomiting.   Musculoskeletal: Positive for gait problem. Negative for arthralgias, back pain, joint swelling and myalgias.   Neurological: Positive for weakness. Negative for dizziness, light-headedness and numbness.   Psychiatric/Behavioral: Negative for agitation, confusion, hallucinations and suicidal ideas.     Objective:     Vital Signs (Most Recent):  Temp: 97.7 °F (36.5 °C) (10/09/17 1118)  Pulse: 87 (10/09/17 1136)  Resp: 17 (10/09/17 1118)  BP: (!) 169/78 (10/09/17 1118)  SpO2: 95 % (10/09/17 1118) Vital Signs (24h Range):  Temp:  [96.4 °F (35.8 °C)-98.5 °F (36.9 °C)] 97.7 °F (36.5 °C)  Pulse:  [] 87  Resp:  [16-20] 17  SpO2:  [95 %-98 %] 95 %  BP: (129-169)/(69-94) 169/78     Weight: 91.5 kg (201 lb 11.5 oz)  Body mass index is 30.67 kg/m².    Intake/Output Summary (Last 24 hours) at 10/09/17 1518  Last data filed at 10/09/17 1300   Gross per 24 hour   Intake              440 ml   Output                1 ml   Net              439 ml      Physical Exam   Constitutional: He appears well-developed and well-nourished. No distress.   Neck: Carotid bruit is not present.   Cardiovascular: Normal rate and regular rhythm.    No murmur heard.  Pulmonary/Chest: Effort normal and breath sounds normal. No respiratory distress. He has no wheezes.   Abdominal: Soft. Bowel sounds are normal. He exhibits no distension. There is no splenomegaly or hepatomegaly. There is no tenderness.   Musculoskeletal: Normal range of motion. He exhibits edema (trace - 1+ and nonpitting).   Neurological: He is alert. No cranial nerve deficit.   Oriented to person and place but not time   Skin: Skin is warm and dry. No rash noted. He is not diaphoretic. No erythema.   Psychiatric: He has a  normal mood and affect. His behavior is normal.   Nursing note and vitals reviewed.    Significant Labs:   BMP:   Recent Labs  Lab 10/09/17  0422   GLU 88      K 3.9      CO2 21*   BUN 15   CREATININE 0.9   CALCIUM 9.0   MG 2.1     CBC: No results for input(s): WBC, HGB, HCT, PLT in the last 48 hours.  All pertinent labs within the past 24 hours have been reviewed.    Assessment/Plan:      * Acute deep vein thrombosis (DVT) of popliteal vein of left lower extremity    - BLE US reveals DVT in L popliteal  - Discussed risks/benefits of anticoagulation with wife and patient; started coumadin/lovenox bridge  - INR daily; pharm D following  - 10/9: INR 2.1 ; D/C'd lovenox and continue warfarin 5 mg daily  - Placed referral to coumadin clinic        Physical deconditioning    - Patient reports decreased ambulation while at SNF.  - PT/OT following and SW assisting with discharge plan  - Fall precautions, ambulate with assistance, incentive spirometry.  10/3: Wife agreeable to SNF. SW to place referrals.   10/4: Denied [facility]  10/5: SW placed more referrals for SNF  Discharged pending SNF. Per SW, pt has been medically accepted to multiple facilities and awaiting auth from [facility].        Lower extremity edema    - BNP 70, no echo on file.  CXR unremarkable.  - Patient received Lasix 40mg IV in ER  - Patient would likely benefit from compression stockings on discharge to use long term, considering h/o autonomic instability and BLE edema.  - Strict I&Os, daily weights, cardiac diet.  Elevate BLE.  - 2d shows pEF without wall motion abnormalities  - BLE edema improved with IV lasix and now discontinued        Autonomic instability    - Decreased midodrine to 5mg TID with parameters and monitoring  - Fall precautions          VTE Risk Mitigation         Ordered     warfarin tablet 7.5 mg  Daily     Route:  Oral        10/05/17 1001     Medium Risk of VTE  Once      10/02/17 0230     Place TIGRE hose  Until  discontinued      10/02/17 0230     Place sequential compression device  Until discontinued      10/02/17 0230        Ashlie Bush PA-C  Department of Hospital Medicine   [facility]

## 2017-10-09 NOTE — CONSULTS
PharmD Consult received for:  1.) Warfarin dosing (new start):   · Indication: DVT  · INR trend - 1.3 => 1.6 => 1.8 => 2.1  · Lovenox bridge - d/c'ed this am - please ensure INR does not drop <2 tomorrow as ideally bridge is to continue for 24 hrs after attaining INR goal.  · Continue 7.5 mg PO tonight (will give 1 more dose of 7.5 mg before switching to an alternating schedule)  - if patient d/c's to SNF today, would recommend a schedule of 5 mg PO MWF, 7.5 mg all other days.  · INR daily  · Will follow      Thank you for the consult,    Elida Mary, PharmD  [id number]      **Note: Consults are reviewed Monday-Friday 7:30-4pm. The above recommendations are only suggested. The recommendations should be considered in conjunction with all patient factors.**

## 2017-10-09 NOTE — ASSESSMENT & PLAN NOTE
- BLE US reveals DVT in L popliteal  - Discussed risks/benefits of anticoagulation with wife and patient; started coumadin/lovenox bridge  - INR daily; pharm D following  - 10/9: INR 2.1 ; D/C'd lovenox and continue warfarin 5 mg daily  - Placed referral to coumadin clinic

## 2017-10-09 NOTE — PT/OT/SLP PROGRESS
Physical Therapy  Treatment    Tona eDy   MRN: [id number]   Admitting Diagnosis: Acute deep vein thrombosis (DVT) of popliteal vein of left lower extremity    PT Received On: 10/09/17  PT Start Time: 1445     PT Stop Time: 1509    PT Total Time (min): 24 min       Billable Minutes:  Therapeutic Activity 24    Treatment Type: Treatment  PT/PTA: PTA     PTA Visit Number: 2       General Precautions: Standard, fall  Orthopedic Precautions: N/A   Braces: N/A    Do you have any cultural, spiritual, Baptist conflicts, given your current situation?: none stated    Subjective:  Communicated with nursing prior to session.    Pain/Comfort  Pain Rating 1: 0/10  Pain Rating Post-Intervention 1: 0/10    Objective:     Functional Mobility:  Bed Mobility:   Scooting/Bridging: Minimum Assistance, Moderate Assistance (min-mod assist to scoot to EOB)  Supine to Sit: Moderate Assistance, With side rail  Sit to Supine: Moderate Assistance    Transfers:  Sit <> Stand Assistance: Moderate Assistance  Sit <> Stand Assistive Device: No Assistive Device, Rolling Walker    Gait:   Gait Distance:  (Not performed 2* to pt safety. )    Therapeutic Activities and Exercises:  B LE therex x15 reps with assistance: AP, LAQ, and Hip Flexion  Pt sat EOB ~15 min with SBA-CGA with BUE support.  Pt stood ~30 seconds with min-mod A x2 cueing for upright posture.      AM-PAC 6 CLICK MOBILITY  How much help from another person does this patient currently need?   1 = Unable, Total/Dependent Assistance  2 = A lot, Maximum/Moderate Assistance  3 = A little, Minimum/Contact Guard/Supervision  4 = None, Modified [place]/Independent    Turning over in bed (including adjusting bedclothes, sheets and blankets)?: 3  Sitting down on and standing up from a chair with arms (e.g., wheelchair, bedside commode, etc.): 2  Moving from lying on back to sitting on the side of the bed?: 2  Moving to and from a bed to a chair (including a wheelchair)?: 2  Need to  walk in hospital room?: 2  Climbing 3-5 steps with a railing?: 2  Total Score: 13    AM-PAC Raw Score CMS G-Code Modifier Level of Impairment Assistance   6 % Total / Unable   7 - 9 CM 80 - 100% Maximal Assist   10 - 14 CL 60 - 80% Moderate Assist   15 - 19 CK 40 - 60% Moderate Assist   20 - 22 CJ 20 - 40% Minimal Assist   23 CI 1-20% SBA / CGA   24 CH 0% Independent/ Mod I     Patient left supine with all lines intact, call button in reach and spouse present.    Assessment:  Tona Dey is a 85 y.o. male with a medical diagnosis of Acute deep vein thrombosis (DVT) of popliteal vein of left lower extremity and presents with all deficits noted below. Pt fair to treatment session, and will continue to benefit from skilled PT services at this time. Continue with PT POC as indicated.    Rehab identified problem list/impairments: Rehab identified problem list/impairments: weakness, impaired self care skills, impaired balance, decreased coordination, decreased safety awareness, decreased upper extremity function, impaired endurance, gait instability, impaired cognition, decreased lower extremity function    Rehab potential is fair.    Activity tolerance: Fair    Discharge recommendations: Discharge Facility/Level Of Care Needs: nursing facility, skilled     Barriers to discharge: Barriers to Discharge: Decreased caregiver support    Equipment recommendations: Equipment Needed After Discharge: none     GOALS:    Physical Therapy Goals        Problem: Physical Therapy Goal    Goal Priority Disciplines Outcome Goal Variances Interventions   Physical Therapy Goal     PT/OT, PT Ongoing (interventions implemented as appropriate)     Description:  Goals to be met by: 10/12/17    Patient will increase functional independence with mobility by performin. Supine to sit with Stand-by Assistance  2. Sit to supine with Stand-by Assistance  3. Sit to stand transfer with Contact Guard Assistance w/ RW  4. Bed to chair  transfer with Minimal Assistance using Rolling Walker  5. Gait  x 30 feet with Minimal Assistance using Rolling Walker.   6. Lower extremity exercise program x15 reps per handout, with supervision                      PLAN:    Patient to be seen 3 x/week  to address the above listed problems via gait training, therapeutic activities, therapeutic exercises, neuromuscular re-education  Plan of Care expires: 11/01/17  Plan of Care reviewed with: patient         Nadeen Bowman, PTA  10/09/2017

## 2017-10-09 NOTE — PROCEDURES
DATE OF PROCEDURE:  10/08/2017    EEG NUMBER:  [id number]    REQUESTED BY:  Aracelis Langston M.D.    LOCATION OF SERVICE:  1108    ELECTROENCEPHALOGRAM REPORT      METHODOLOGY:  Electroencephalographic (EEG) recording is recorded with   electrodes placed according to the International 10-20 placement system.  Thirty   two (32) channels of digital signal (sampling rate of 512/sec), including T1   and T2, were simultaneously recorded from the scalp and may include EKG, EMG,   and/or eye monitors.  Recording band pass was 0.1 to 512 Hz.  Digital video   recording of the patient is simultaneously recorded with the EEG.  The patient   is instructed to report clinical symptoms which may occur during the recording   session.  EEG and video recording are stored and archived in digital format.    Activation procedures, which include photic stimulation, hyperventilation and   instructing patients to perform simple tasks, are done in selected patients.    The EEG is displayed on a monitor screen and can be reviewed using different   montages.  Computer assisted-analysis is employed to detect spike and   electrographic seizure activity.  The entire record is submitted for computer   analysis.  The entire recording is visually reviewed, and the times identified   by computer analysis as being spikes or seizures are reviewed again.    Compressed spectral analysis (CSA) is also performed on the activity recorded   from each individual channel.  This is displayed as a power display of   frequencies from 0 to 30 Hz over time.  The CSA is reviewed looking for   asymmetries in power between homologous areas of the scalp, then compared with   the original EEG recording.    Lamoda software was also utilized in the review of this study.  This software   suite analyzes the EEG recording in multiple domains.  Coherence and rhythmicity   are computed to identify EEG sections which may contain organized seizures.    Each channel undergoes  analysis to detect the presence of spike and sharp waves   which have special and morphological characteristics of epileptic activity.  The   routine EEG recording is converted from special into frequency domain.  This is   then displayed comparing homologous areas to identify areas of significant   asymmetry.  Algorithm to identify non-cortically generated artifact is used to   separate artifact from the EEG.    EEG FINDINGS:  The patient was awake during the recording.  Background was a   somewhat irregular 8 Hz posterior dominant rhythm present in the occipital,   parietal, posterior temporal and central regions bilaterally.  Irregular theta   with superimposed diffusely.  Some intermixed theta was seen bilaterally and   this was symmetrical over both sides.  There were no lateralized or focal   findings on the recording and no spike or sharp wave activity was seen.  There   were no rhythmic buildups to indicate subclinical or non-convulsive seizures.    The patient was asked questions and correctly responded with his name, his   birthday; however, he describes the president as Ari.  He identified   correctly the year, the month, his location as being in [place] and that he   was in the hospital.  He correctly added 5+5 giving the answer of 10.    IMPRESSION:  Mildly abnormal EEG.  There is some scattered theta noted   indicative of a mild encephalopathy, which is nonfocal and nonspecific.    CLINICAL CORRELATION:  The patient is an 85-year-old gentleman who was last seen   in his usual baseline state on October 6.  He apparently was found unresponsive   and stroke code was called.  This EEG does show some mild theta intermixed with   a normal posterior dominant rhythm and no focal changes and no epileptic   activity.      RR/HN  dd: 10/09/2017 11:09:25 (CDT)  td: 10/09/2017 11:29:34 (CDT)  Doc ID   #[id number]  Job ID #[id number]    CC:

## 2017-10-09 NOTE — ASSESSMENT & PLAN NOTE
- BNP 70, no echo on file.  CXR unremarkable.  - Patient received Lasix 40mg IV in ER  - Patient would likely benefit from compression stockings on discharge to use long term, considering h/o autonomic instability and BLE edema.  - Strict I&Os, daily weights, cardiac diet.  Elevate BLE.  - 2d shows pEF without wall motion abnormalities  - BLE edema improved with IV lasix and now discontinued

## 2017-10-09 NOTE — PT/OT/SLP PROGRESS
Occupational Therapy  Treatment    Tona Dey   MRN: [id number]   Admitting Diagnosis: Acute deep vein thrombosis (DVT) of popliteal vein of left lower extremity    OT Date of Treatment: 10/09/17   OT Start Time: 0836  OT Stop Time: 0902  OT Total Time (min): 26 min    Billable Minutes:  Therapeutic Activity 16 and Therapeutic Exercise 10    General Precautions: Standard, fall  Orthopedic Precautions:    Braces:      Do you have any cultural, spiritual, Taoism conflicts, given your current situation?: none stated    Subjective:  Communicated with RN prior to session.  Pain/Comfort  Pain Rating 1: 0/10    Objective:        Functional Mobility:  Bed Mobility:  Rolling/Turning to Left: Contact guard assistance  Scooting/Bridging: Moderate Assistance  Supine to Sit: Contact Guard Assistance  Sit to Supine: Minimum Assistance    Transfers:   Sit <> Stand Assistance: Minimum Assistance  Sit <> Stand Assistive Device: Rolling Walker    Functional Ambulation: Pt stood at bedside and took 3 sidesteps to the HOB with Min A using a RW.    Activities of Daily Living:        LE Dressing Level of Assistance: Total assistance     Grooming Level of Assistance:  (pt declined.)     Balance:   Static Sit: GOOD: Takes MODERATE challenges from all directions  Dynamic Sit: GOOD-: Maintains balance through MODERATE excursions of active trunk movement,     Static Stand: FAIR+: Takes MINIMAL challenges from all directions  Dynamic stand: FAIR: Needs CONTACT GUARD during gait    Therapeutic Activities and Exercises:  From EOB, performed BUE therex --- shld presses and elbow flex/ext, x 15 reps each.  Took sidesteps toward HOB with Min A for RW management.  Pt attempted to don socks EOB but not able.    AM-PAC 6 CLICK ADL   How much help from another person does this patient currently need?   1 = Unable, Total/Dependent Assistance  2 = A lot, Maximum/Moderate Assistance  3 = A little, Minimum/Contact Guard/Supervision  4 = None, Modified  "[place]/Independent    Putting on and taking off regular lower body clothing? : 1  Bathing (including washing, rinsing, drying)?: 2  Toileting, which includes using toilet, bedpan, or urinal? : 1  Putting on and taking off regular upper body clothing?: 2  Taking care of personal grooming such as brushing teeth?: 2  Eating meals?: 3  Total Score: 11     AM-PAC Raw Score CMS "G-Code Modifier Level of Impairment Assistance   6 % Total / Unable   7 - 8 CM 80 - 100% Maximal Assist   9-13 CL 60 - 80% Moderate Assist   14 - 19 CK 40 - 60% Moderate Assist   20 - 22 CJ 20 - 40% Minimal Assist   23 CI 1-20% SBA / CGA   24 CH 0% Independent/ Mod I       Patient left supine with call button in reach and PCT\ present    ASSESSMENT:  Tona Dey is a 85 y.o. male with a medical diagnosis of Acute deep vein thrombosis (DVT) of popliteal vein of left lower extremity and continues with decreased (I) in multiple ADL areas, functional mobility & t/fs as well as decreased overall safety awareness, strength, ROM, endurance and balance. Pt follows simple commands for the most part but has decreased cognition is still very debilitated and unable to function independently. Pt would benefit from skilled OT services as well as SNF placement to address these deficits and to facilitate improving (I) with daily tasks.    Rehab identified problem list/impairments: Rehab identified problem list/impairments: weakness, impaired self care skills, impaired balance, decreased coordination, decreased safety awareness, decreased upper extremity function, impaired endurance, gait instability, impaired cognition, decreased lower extremity function    Rehab potential is fair.    Activity tolerance: Fair    Discharge recommendations: Discharge Facility/Level Of Care Needs: nursing facility, skilled     Barriers to discharge: Barriers to Discharge: Decreased caregiver support    Equipment recommendations: none     GOALS:    Occupational " Therapy Goals        Problem: Occupational Therapy Goal    Goal Priority Disciplines Outcome Interventions   Occupational Therapy Goal     OT, PT/OT Ongoing (interventions implemented as appropriate)    Description:  Goals to be met by: 10/18/17     Patient will increase functional independence with ADLs by performing:    REVISED:    UE Dressing with Minimal Assistance.  Grooming while standing with Minimal Assistance.  Toileting from bedside commode with Moderate Assistance for hygiene and clothing management.   Supine to sit with Stand-by Assistance.  Stand pivot transfers with Minimal Assistance.                     Plan:  Patient to be seen 3 x/week to address the above listed problems via self-care/home management, neuromuscular re-education, therapeutic activities, therapeutic exercises  Plan of Care expires: 11/03/17  Plan of Care reviewed with: patient         DANIS Martin  10/09/2017

## 2017-10-09 NOTE — PLAN OF CARE
Hospital day # 8. PT recommending SNF. Pt. has been accepted to [facility] awaiting approval from N. SW/CM will continue to follow and facilitate placement.        10/09/17 0649   Right Care Assessment   Can the patient answer the patient profile reliably? Yes, cognitively intact   How often would a person be available to care for the patient? Whenever needed   Describe the patient's ability to walk at the present time. Major restrictions/daily assistance from another person   How does the patient rate their overall health at the present time? Fair   Number of comorbid conditions (as recorded on the chart) Two   During the past month, has the patient often been bothered by feeling down, depressed or hopeless? No   During the past month, has the patient often been bothered by little interest or pleasure in doing things? No

## 2017-10-09 NOTE — PLAN OF CARE
Per DOC Senior, pt has been approved for a short stay SNF placement.  SW has left a vmsg for Wife (Alem) for a selection on SNF placement as pt has been approved by 2 facilities: [facility] and [facility].    3:00pm - Wife has selected [facility] for SNF placement.  Nanci admission coordinator has been notified.  Wife needs to sign paperwork.    Adele Davison, DAX  [phone]

## 2017-10-09 NOTE — ASSESSMENT & PLAN NOTE
- altered mental status overnight 10/6 and returned to baseline. ? Related to tramadol use vs sundowning.   - NV consulted   - EEG and MRI brain cancelled as neuro exam returned to baseline and no focal deficits

## 2017-10-09 NOTE — ASSESSMENT & PLAN NOTE
- Patient reports decreased ambulation while at SNF.  - PT/OT following and SW assisting with discharge plan  - Fall precautions, ambulate with assistance, incentive spirometry.  10/3: Wife agreeable to SNF. SW to place referrals.   10/4: Denied [facility]  10/5: SW placed more referrals for SNF  Discharged pending SNF. Per SW, pt has been medically accepted to multiple facilities and awaiting auth from [facility].

## 2017-10-10 VITALS
WEIGHT: 201.75 LBS | TEMPERATURE: 98 F | BODY MASS INDEX: 30.58 KG/M2 | OXYGEN SATURATION: 98 % | DIASTOLIC BLOOD PRESSURE: 67 MMHG | SYSTOLIC BLOOD PRESSURE: 154 MMHG | HEIGHT: 68 IN | RESPIRATION RATE: 18 BRPM | HEART RATE: 73 BPM

## 2017-10-10 PROBLEM — R76.11 PPD POSITIVE: Status: ACTIVE | Noted: 2017-10-10

## 2017-10-10 LAB
INR PPP: 2.4
PROTHROMBIN TIME: 23.8 SEC

## 2017-10-10 PROCEDURE — 25000003 PHARM REV CODE 250: Performed by: PHYSICIAN ASSISTANT

## 2017-10-10 PROCEDURE — 85610 PROTHROMBIN TIME: CPT

## 2017-10-10 PROCEDURE — 36415 COLL VENOUS BLD VENIPUNCTURE: CPT

## 2017-10-10 PROCEDURE — 99239 HOSP IP/OBS DSCHRG MGMT >30: CPT | Mod: ,,, | Performed by: PHYSICIAN ASSISTANT

## 2017-10-10 RX ADMIN — CARBIDOPA AND LEVODOPA 1 TABLET: 25; 100 TABLET ORAL at 03:10

## 2017-10-10 RX ADMIN — MIDODRINE HYDROCHLORIDE 5 MG: 5 TABLET ORAL at 03:10

## 2017-10-10 RX ADMIN — WARFARIN SODIUM 5 MG: 5 TABLET ORAL at 04:10

## 2017-10-10 RX ADMIN — CARBIDOPA AND LEVODOPA 1 TABLET: 25; 100 TABLET ORAL at 05:10

## 2017-10-10 NOTE — PLAN OF CARE
Patient resting quietly in bed when CM rounded. No family at the bedside. Patient has been accepted to [facility]. Awaiting admission paperwork to be completed by the patient's spouse Alem Dey ([phone]). Will continue to follow.

## 2017-10-10 NOTE — PLAN OF CARE
Transportation has been arranged w/[facility] for w/c transport to [facility] for Short SNF Stay.  Pt has been assigned room 218-A.  Taylor, dgr, and Alem, wife, have been notified of transfer and are agreeable to placement. Christine, nurse [id number], has been given # [phone] for report and will hand off to nurse for above mentioned room #.  EST for w/c van  is 3:00pm.    Adele Davison LMSW  Ext. [phone]

## 2017-10-10 NOTE — PLAN OF CARE
BALAJI received a return call from the patient's spouse, Alem Dey ([phone]), as requested. IMM explained to Alem, phone consent obtained, & form placed in the patient's chart. Alem stated that she signed admission paperwork at [facility] & is in agreement with plan to discharge patient today. Will continue to follow.

## 2017-10-10 NOTE — ASSESSMENT & PLAN NOTE
- PPD done for SNF placement; +PPD  - CXR without acute findings  - Pt asymptomatic  - Pt with no risk factors  - No need for ppx at this time  - Discussed with staff Dr. Escalante

## 2017-10-10 NOTE — NURSING
Patient discharged via [facility] transportation with personal belongings, called & spoke to Mrs. Dey to discuss estimated pickup & general discharge instructions, spouse verbalizes understanding & is aware of facility & room number

## 2017-10-10 NOTE — PLAN OF CARE
Per Nanci, admission coordinator for [facility], wife has completed paperwork.  PPD/CXR need to be faxed.  Pt is expected to transfer today.    Adele Davison, ENDY  [phone]

## 2017-10-11 ENCOUNTER — ANTI-COAG VISIT (OUTPATIENT)
Dept: CARDIOLOGY | Facility: CLINIC | Age: 82
End: 2017-10-11

## 2017-10-11 DIAGNOSIS — I82.432 ACUTE DEEP VEIN THROMBOSIS (DVT) OF POPLITEAL VEIN OF LEFT LOWER EXTREMITY: ICD-10-CM

## 2017-10-11 DIAGNOSIS — Z79.01 LONG-TERM (CURRENT) USE OF ANTICOAGULANTS: ICD-10-CM

## 2017-10-11 NOTE — PROGRESS NOTES
86yo M recently admitted from 10/1 through 10/10 due to Acute deep vein thrombosis (DVT) of popliteal vein of left lower extremity.  Pt's PMHx: metabolic encephalopathy, lower extremity edema, CKD, anemia and autonomic instability.  See calendar for recent INRs and Warfarin doses.  Per his med card, he was discharged with Warfarin 5mg tablets.  The pt was discharged to [facility] ([phone]).  I called and verified that they are currently monitoring coumadin for the pt and have no discharge date set.  We will c/s for his discharge from this facility.    Of note, the pt will need to establish care with an [facility] staff PCP for us to monitor warfarin for him.  He is booked in the IM clinic for an appt on 10/12.

## 2017-10-11 NOTE — PLAN OF CARE
10/11/17 0734   Final Note   Assessment Type Final Discharge Note     Patient discharged to [facility] 10/10/17.

## 2017-10-12 ENCOUNTER — PATIENT OUTREACH (OUTPATIENT)
Dept: ADMINISTRATIVE | Facility: CLINIC | Age: 82
End: 2017-10-12

## 2017-10-14 ENCOUNTER — HOSPITAL ENCOUNTER (EMERGENCY)
Facility: HOSPITAL | Age: 82
Discharge: HOME | End: 2017-10-14
Attending: EMERGENCY MEDICINE
Payer: MEDICARE

## 2017-10-14 VITALS
HEIGHT: 72 IN | OXYGEN SATURATION: 100 % | RESPIRATION RATE: 20 BRPM | SYSTOLIC BLOOD PRESSURE: 138 MMHG | DIASTOLIC BLOOD PRESSURE: 65 MMHG | WEIGHT: 200 LBS | TEMPERATURE: 98 F | BODY MASS INDEX: 27.09 KG/M2 | HEART RATE: 86 BPM

## 2017-10-14 DIAGNOSIS — M79.89 LEG SWELLING: ICD-10-CM

## 2017-10-14 DIAGNOSIS — M54.9 BACK PAIN: ICD-10-CM

## 2017-10-14 DIAGNOSIS — M54.50 CHRONIC RIGHT-SIDED LOW BACK PAIN WITHOUT SCIATICA: Primary | ICD-10-CM

## 2017-10-14 DIAGNOSIS — M25.562 ACUTE PAIN OF LEFT KNEE: ICD-10-CM

## 2017-10-14 DIAGNOSIS — G89.29 CHRONIC RIGHT-SIDED LOW BACK PAIN WITHOUT SCIATICA: Primary | ICD-10-CM

## 2017-10-14 DIAGNOSIS — M25.569 KNEE PAIN: ICD-10-CM

## 2017-10-14 DIAGNOSIS — Z86.718 HISTORY OF DVT (DEEP VEIN THROMBOSIS): ICD-10-CM

## 2017-10-14 LAB
ALBUMIN SERPL BCP-MCNC: 3.1 G/DL
ALP SERPL-CCNC: 56 U/L
ALT SERPL W/O P-5'-P-CCNC: 26 U/L
ANION GAP SERPL CALC-SCNC: 8 MMOL/L
APTT BLDCRRT: <21 SEC
AST SERPL-CCNC: 14 U/L
BASOPHILS # BLD AUTO: 0.01 K/UL
BASOPHILS NFR BLD: 0.2 %
BILIRUB SERPL-MCNC: 0.9 MG/DL
BILIRUB UR QL STRIP: NEGATIVE
BNP SERPL-MCNC: 74 PG/ML
BUN SERPL-MCNC: 11 MG/DL
CALCIUM SERPL-MCNC: 9 MG/DL
CHLORIDE SERPL-SCNC: 108 MMOL/L
CLARITY UR REFRACT.AUTO: CLEAR
CO2 SERPL-SCNC: 23 MMOL/L
COLOR UR AUTO: YELLOW
CREAT SERPL-MCNC: 0.8 MG/DL
DIFFERENTIAL METHOD: ABNORMAL
EOSINOPHIL # BLD AUTO: 0.1 K/UL
EOSINOPHIL NFR BLD: 1.9 %
ERYTHROCYTE [DISTWIDTH] IN BLOOD BY AUTOMATED COUNT: 12.7 %
EST. GFR  (AFRICAN AMERICAN): >60 ML/MIN/1.73 M^2
EST. GFR  (NON AFRICAN AMERICAN): >60 ML/MIN/1.73 M^2
GLUCOSE SERPL-MCNC: 104 MG/DL
GLUCOSE UR QL STRIP: NEGATIVE
HCT VFR BLD AUTO: 36.5 %
HGB BLD-MCNC: 11.8 G/DL
HGB UR QL STRIP: NEGATIVE
INR PPP: 2.1
KETONES UR QL STRIP: NEGATIVE
LEUKOCYTE ESTERASE UR QL STRIP: NEGATIVE
LYMPHOCYTES # BLD AUTO: 2.1 K/UL
LYMPHOCYTES NFR BLD: 50.1 %
MCH RBC QN AUTO: 31.9 PG
MCHC RBC AUTO-ENTMCNC: 32.3 G/DL
MCV RBC AUTO: 99 FL
MONOCYTES # BLD AUTO: 0.3 K/UL
MONOCYTES NFR BLD: 6.7 %
NEUTROPHILS # BLD AUTO: 1.7 K/UL
NEUTROPHILS NFR BLD: 40.6 %
NITRITE UR QL STRIP: NEGATIVE
PH UR STRIP: 5 [PH] (ref 5–8)
PLATELET # BLD AUTO: 203 K/UL
PMV BLD AUTO: 10 FL
POTASSIUM SERPL-SCNC: 4.3 MMOL/L
PROT SERPL-MCNC: 6.9 G/DL
PROT UR QL STRIP: NEGATIVE
PROTHROMBIN TIME: 20.6 SEC
RBC # BLD AUTO: 3.7 M/UL
SODIUM SERPL-SCNC: 139 MMOL/L
SP GR UR STRIP: 1.02 (ref 1–1.03)
URN SPEC COLLECT METH UR: NORMAL
UROBILINOGEN UR STRIP-ACNC: NEGATIVE EU/DL
WBC # BLD AUTO: 4.15 K/UL

## 2017-10-14 PROCEDURE — 80053 COMPREHEN METABOLIC PANEL: CPT

## 2017-10-14 PROCEDURE — 81003 URINALYSIS AUTO W/O SCOPE: CPT

## 2017-10-14 PROCEDURE — 93010 ELECTROCARDIOGRAM REPORT: CPT | Mod: ,,, | Performed by: INTERNAL MEDICINE

## 2017-10-14 PROCEDURE — 85610 PROTHROMBIN TIME: CPT

## 2017-10-14 PROCEDURE — 25000003 PHARM REV CODE 250: Performed by: EMERGENCY MEDICINE

## 2017-10-14 PROCEDURE — 85730 THROMBOPLASTIN TIME PARTIAL: CPT

## 2017-10-14 PROCEDURE — 99285 EMERGENCY DEPT VISIT HI MDM: CPT

## 2017-10-14 PROCEDURE — 85025 COMPLETE CBC W/AUTO DIFF WBC: CPT

## 2017-10-14 PROCEDURE — 99285 EMERGENCY DEPT VISIT HI MDM: CPT | Mod: ,,, | Performed by: EMERGENCY MEDICINE

## 2017-10-14 PROCEDURE — 83880 ASSAY OF NATRIURETIC PEPTIDE: CPT

## 2017-10-14 RX ORDER — WARFARIN SODIUM 5 MG/1
5 TABLET ORAL DAILY
Qty: 30 TABLET | Refills: 0 | Status: SHIPPED | OUTPATIENT
Start: 2017-10-14 | End: 2017-10-16 | Stop reason: SDUPTHER

## 2017-10-14 RX ORDER — ACETAMINOPHEN 325 MG/1
650 TABLET ORAL
Status: DISCONTINUED | OUTPATIENT
Start: 2017-10-14 | End: 2017-10-14 | Stop reason: HOSPADM

## 2017-10-14 RX ORDER — WARFARIN SODIUM 5 MG/1
5 TABLET ORAL DAILY
Qty: 30 TABLET | Refills: 0 | Status: ON HOLD | OUTPATIENT
Start: 2017-10-14 | End: 2017-11-06 | Stop reason: HOSPADM

## 2017-10-14 RX ORDER — TRAMADOL HYDROCHLORIDE 50 MG/1
50 TABLET ORAL
Status: COMPLETED | OUTPATIENT
Start: 2017-10-14 | End: 2017-10-14

## 2017-10-14 RX ORDER — CARBIDOPA AND LEVODOPA 25; 100 MG/1; MG/1
1 TABLET ORAL 3 TIMES DAILY
Qty: 30 TABLET | Refills: 0 | Status: SHIPPED | OUTPATIENT
Start: 2017-10-14 | End: 2017-10-16 | Stop reason: SDUPTHER

## 2017-10-14 RX ORDER — TRAMADOL HYDROCHLORIDE 50 MG/1
50 TABLET ORAL EVERY 12 HOURS PRN
Qty: 20 TABLET | Refills: 0 | Status: SHIPPED | OUTPATIENT
Start: 2017-10-14 | End: 2017-10-24

## 2017-10-14 RX ORDER — CARBIDOPA AND LEVODOPA 25; 100 MG/1; MG/1
1 TABLET ORAL 3 TIMES DAILY
Qty: 90 TABLET | Refills: 0 | Status: ON HOLD | OUTPATIENT
Start: 2017-10-14 | End: 2018-02-02 | Stop reason: HOSPADM

## 2017-10-14 RX ORDER — ACETAMINOPHEN 325 MG/1
650 TABLET ORAL EVERY 4 HOURS PRN
Qty: 30 TABLET | Refills: 0 | Status: SHIPPED | OUTPATIENT
Start: 2017-10-14

## 2017-10-14 RX ORDER — LIDOCAINE 50 MG/G
1 PATCH TOPICAL DAILY
Qty: 30 PATCH | Refills: 0 | Status: ON HOLD | OUTPATIENT
Start: 2017-10-14 | End: 2017-11-05

## 2017-10-14 RX ADMIN — TRAMADOL HYDROCHLORIDE 50 MG: 50 TABLET, FILM COATED ORAL at 09:10

## 2017-10-14 NOTE — ED PROVIDER NOTES
"Encounter Date: 10/14/2017       History     Chief Complaint   Patient presents with    Back Pain     c/o back pain for 4-5 days (no support in bed) - left knee pain 5 days. reports no trauma.      86 y/o obese M with h/o anemia, chronic lumbar back pain, orthostatic hypotension dysautonomic syndrome, PD, renal dz, recent LE swelling with dx LLE popliteal DVT on coumadin p/w worsened atraumatic R sided lower back pain and L knee pain x yest, as well as 3d of worsened BLE swelling. Pt is v poor historian, here with his wife. He was recently admitted earlier this month for his DVT and transferred to [facility] for coumadin monitoring with rehab; has apparently been sleeping on a box spring mattress while there which he and daughter state has caused his back pain to recur. They left the facility today against medical advice, citing that their , and adamant about continuing care at home. Per his BLE swelling- he had a similar presentation earlier this month as well however TTE and BNP were WNL and he was dx with dependent edema. He was given IV lasix, compression stockings. They deny falls, SOB, CP, pre-syncope / syncope, weakness/numbness or focal neuro changes, difficulty with urinating, changes in bowel habits (LBM yest), f/c, subjective saddle anesth, and other complaints. Daughter states that they only had been giving him Tyl there, which did not help; and that ultram helped him with his shoulder pain in the past and he tolerated it well. Wife states that "the clot should be gone by now" though he is supposed to be on coumadin x 3 mos.                Review of patient's allergies indicates:  No Known Allergies  Past Medical History:   Diagnosis Date    Anemia     Chronic back pain     Hypertension     Lumbago     Orthostatic hypotension dysautonomic syndrome     Parkinson disease     Renal disorder      Past Surgical History:   Procedure Laterality Date    BACK SURGERY  1961    ROTATOR CUFF REPAIR   "     Family History   Problem Relation Age of Onset    No Known Problems Mother     Cancer Father      Social History   Substance Use Topics    Smoking status: Former Smoker     Quit date: 8/25/1985    Smokeless tobacco: Never Used    Alcohol use 1.2 oz/week     2 Shots of liquor per week     Review of Systems   Constitutional: Negative for chills, diaphoresis, fatigue and fever.   HENT: Negative for congestion, rhinorrhea and sore throat.    Eyes: Negative for visual disturbance.   Respiratory: Negative for cough and shortness of breath.    Cardiovascular: Positive for leg swelling. Negative for chest pain and palpitations.   Gastrointestinal: Negative for abdominal distention, abdominal pain, blood in stool, constipation, diarrhea, nausea and vomiting.   Genitourinary: Negative for difficulty urinating, dysuria, flank pain, hematuria and urgency.   Musculoskeletal: Positive for arthralgias, back pain and joint swelling. Negative for neck pain and neck stiffness.   Skin: Negative for rash.   Neurological: Negative for dizziness, syncope, weakness, light-headedness, numbness and headaches.   Hematological: Does not bruise/bleed easily.   Psychiatric/Behavioral: Negative for behavioral problems and confusion.       Physical Exam     Initial Vitals [10/14/17 0741]   BP Pulse Resp Temp SpO2   (!) 156/85 83 16 97.8 °F (36.6 °C) 96 %      MAP       108.67         Physical Exam    Nursing note and vitals reviewed.  Constitutional: He is not diaphoretic. No distress.   Non-toxic alert obese elderly BM at baseline mental status, in NAD, with wife at bedside   HENT:   Head: Normocephalic and atraumatic.   Mouth/Throat: Oropharynx is clear and moist. No oropharyngeal exudate.   Eyes: Conjunctivae and EOM are normal. Pupils are equal, round, and reactive to light. No scleral icterus.   Neck: Normal range of motion. Neck supple. No tracheal deviation present. No JVD present.   Cardiovascular: Normal rate, regular rhythm and  intact distal pulses.   No murmur heard.  Pulmonary/Chest: Breath sounds normal. No stridor. He has no wheezes. He has no rhonchi. He has no rales.   Abdominal: Soft. He exhibits no distension and no mass (No palpable pulsatile mass). There is no tenderness.   Musculoskeletal: He exhibits edema (Diffuse BLE edema L > R with 3+ pitting) and tenderness (L knee, mild TTP medially; FROM).   +mod R lumbar back TTP; reproducible with elevateion of legs; neg SLR and crossed SLR tests; no overlying skin changes; no midline c/t/l spine TTP stepoff or deformity   Neurological: He is alert and oriented to person, place, and time. He has normal strength. No cranial nerve deficit or sensory deficit.   5/5 strength x all jts BUE/BLE; SILT; unable to appreciate distal pulses 2/2 edema but CR brisk, no discoloration / coolness   Skin: Skin is warm and dry. Capillary refill takes less than 2 seconds. No rash noted.   Psychiatric: He has a normal mood and affect. Thought content normal.       Vargas Malin MD I PGY-4 EM  10/14/2017 9:42 AM    ED Course   Procedures  Labs Reviewed   CBC W/ AUTO DIFFERENTIAL - Abnormal; Notable for the following:        Result Value    RBC 3.70 (*)     Hemoglobin 11.8 (*)     Hematocrit 36.5 (*)     MCV 99 (*)     MCH 31.9 (*)     Gran # 1.7 (*)     Lymph% 50.1 (*)     All other components within normal limits   COMPREHENSIVE METABOLIC PANEL - Abnormal; Notable for the following:     Albumin 3.1 (*)     All other components within normal limits   PROTIME-INR - Abnormal; Notable for the following:     Prothrombin Time 20.6 (*)     INR 2.1 (*)     All other components within normal limits   B-TYPE NATRIURETIC PEPTIDE   URINALYSIS, REFLEX TO URINE CULTURE    Narrative:     Preferred Collection Type->Urine, Clean Catch   APTT     EKG Readings: (Independently Interpreted)   Initial Reading: No STEMI. Rhythm: Normal Sinus Rhythm. Heart Rate: 80. Ectopy: No Ectopy. Conduction: Normal. Axis: Normal. Clinical  Impression: Normal Sinus Rhythm       X-Rays:   Independently Interpreted Readings:   Chest X-Ray: Normal heart size. Grossly unremarkable CXR           APC / Resident Notes:   HO-IV MDM: Pt presents as above- with initial ddx incl but not limited to MSK, neuropathy, renal dz, hypoAlb, CHF, dependent edema among others; much lower suspicion for dissection, UTI/pyelo, nephrolithiasis- una as pt's quality of back pain intermittent and described as similar to prior episodes with likely exacerbating factor his mattress situation. No clinical e/o PE thus far. Giving ultram 2/2 pt's reported positive response and tolerance. Given that he is an elderly poor historian with complicated medical hx, repeating CHF w/u here; checking INR; obtaining XRs L-spine and knee, and will reassess. Deferring US for now; if pt is therapeutic with INR, then will likely not require US as would not alter mgmt. Anticipate that if w/u unremarkable then can be discharged.  Case and plan d/w staff Dr. Ricketts.  Vargas Malin MD I PGY-4 EM  10/14/2017 9:43 AM    HOIV update: PVR 150s which is not c/w acute urinary obstrucn. Labs including BNP unremarkable; imaging unremarkable as well. INR therapeutic at 2.1. Pain somewhat but incompletely improved with ultram. No acute events since being monitored in ED. I spoke with SW/CM service re: pt, they stated pt cannot get coumadin clinic appt until  Pt and wife were reassured re: our findings; that his pain is most likely an exacerbn of his chronic lumbago, dependent edema (which he apparently has not been using compression stockings for), and non-acute knee pain. Rx lidoderm patch, ultram PRN; compression stockings and routine LE elevation; stretching/ROM exercises encouraged; 1-mo supply of coumadin and sinemet (requested per wife). Referral info given for [facility] coumadin clinic; pt's wife will call for appt with their [employer] MD Dr. Marilu Mcginnis to be seen this week as early as Monday. Discharging with strict  return precautions.  Vargas Malin MD I PGY-4 EM  10/14/2017 12:27 PM           Attending Attestation:   Physician Attestation Statement for Resident:  As the supervising MD  I agree with the above history. -:   As the supervising MD I agree with the above PE.    As the supervising MD I agree with the above treatment, course, plan, and disposition.  I have reviewed and agree with the residents interpretation of the following: lab data, x-rays and EKG.  I have reviewed the following: old records at this facility.                    ED Course      Clinical Impression:   The primary encounter diagnosis was Chronic right-sided low back pain without sciatica. Diagnoses of Back pain, Leg swelling, Knee pain, Acute pain of left knee, and History of DVT (deep vein thrombosis) were also pertinent to this visit.    Disposition:   Disposition: Discharged  Condition: Stable          Vargas Malin MD  Resident  10/14/17 1249       Vargas Malin MD  Resident  10/14/17 1309       Luis Angel Guevara MD  11/13/17 5765

## 2017-10-14 NOTE — ED TRIAGE NOTES
Wife states pt was lying on a box spring at the nursing ( [facility])  And now pt has back pain and his legs are all swollen again. Pt has 3+ pitting edema to both legs.  Pt is awake and alert. Pt  Is AMA from home. Pt's wife took pt out and wants pt placed in his home with a hospital bed. Does not feel that he is receiving the care he needs at [facility]. Pt is AAOx4, skin w/d, respirations even and unlabored. Wife states pt was transferred to [facility] after admit here for DVT in left leg.

## 2017-10-14 NOTE — ED NOTES
Pt placed on cardiac monitor, continuous pulse ox, cycling blood pressures. Side rails up x2, call bell in reach, bed in low position with brake engaged. Wife at bedside.

## 2017-10-14 NOTE — ED NOTES
Pt resting quietly on stretcher; remains on continuous pulse ox monitoring with non-invasive blood pressure to cycle every 30 minutes.  VS stable.  Pt and wife updated on wait for lab results.  Bed locked in lowest position; side rails up and locked x 2; call light, bedside table, and personal belongings within reach.  Pt denies needs or complaints at this time; will continue to monitor pt.

## 2017-10-14 NOTE — ED NOTES
Per ems back pain increases with movement and ROM - left knee pain with movement and palpation. Reports that he is in nursing home after diagnosed with blood clot to left leg and is on coumadin. Wanted him to be in nursing home for monitoring.

## 2017-10-14 NOTE — ED NOTES
LOC: The patient is awake and alert; oriented x 3 and speaking appropriately.  APPEARANCE: Patient resting comfortably, patient is clean and well groomed  SKIN: warm and dry, normal skin turgor & moist mucus membranes, skin intact, no breakdown noted.  MUSCULOSKELETAL: Patient moving all extremities well, no obvious swelling or deformities noted, c/o lower back and left knee pain   RESPIRATORY: Airway is open and patent, breath sounds clear throughout all lung fields; respirations are spontaneous, normal effort and rate  CARDIAC: Patient has a normal rate, peripheral edema in lower extremities noted, capillary refill < 3 seconds; No complaints of chest pain   ABDOMEN: Soft and non tender to palpation, no distention noted. Bowel sounds present x 4

## 2017-10-16 ENCOUNTER — PATIENT OUTREACH (OUTPATIENT)
Dept: ADMINISTRATIVE | Facility: CLINIC | Age: 82
End: 2017-10-16

## 2017-10-16 NOTE — PATIENT INSTRUCTIONS
D-Dimer  Does this test have other names?  Fragment D-dimer, fibrin degradation fragment  What is this test?  This is a blood test to look for a substance called D-dimer. This test is used to rule out a blood clot.  D-dimer is a protein fragment from the break down of a blood clot. Blood clots generally begin to slowly break down after they are formed, and this process releases D-dimer into the blood.   Why do I need this test?  You may need this test if your health care provider suspects you have a dangerous blood clot. A blood clot that forms in a deep vein is called a deep vein thrombosis (DVT). A DVT is most commonly found in the legs. If the clot travels to the lungs, it's called a pulmonary embolism (PE). In the lungs, the clot can cut off the flow of blood. This is a medical emergency and may cause death.  Symptoms of a blood clot include:  · Leg swelling (edema)  · Leg pain or tenderness  · Leg redness  If you have these symptoms, call your health care provider.  You may also have a D-dimer test if you have symptoms of a blood clot in the lungs, such as:  · Trouble breathing  · Coughing (may cough up blood)  · Fast heartbeat  · Chest pain   · Sweating  · Fainting  If you have these symptoms, call 911 or get emergency help.  People with blood clots often have 1 or more risk factors. These risk factors include:  · Major surgery (for example, hip surgery) or injury (for example, a broken leg)  · Not being able to move for long periods of time (for example, from being in the hospital, or taking long trips by plane or car)  · Pregnancy or recent childbirth  · Certain cancers  · Antiphospholipid syndrome  · Inherited clotting disorder, such as factor V Leiden mutation  You may also need this test to help diagnose and monitor treatment for other conditions.  What other tests might I have along with this test?  You may need more blood tests, including platelet count, fibrinogen, and prothrombin time.   If your  D-dimer test is positive, you will have other tests. You may have the following imaging tests to look for blood clots:  · Ultrasonography  · Computed tomography (CT) angiography  · Pulmonary angiography  · Ventilation/perfusion scanning   These tests help your health care provider diagnose blood clots in your legs, arms, lungs, or other parts of the body.  What do my test results mean?  Many things may affect your lab test results. These include the method each lab uses to do the test. Even if your test results are different from the normal value, you may not have a problem. To learn what the results mean for you, talk with your health care provider.  An elevated D-dimer level is not normal. It is usually found after a clot has formed and is in the process of breaking down. If you are having significant formation and breakdown of blood clot in your body, your D-dimer may be elevated.  A negative D-dimer test means that a blood clot is highly unlikely.  A positive D-dimer test doesn't mean that you have a clot. There may be other reasons it is positive. More testing is usually needed.  How is this test done?  The test requires a blood sample, which is drawn through a needle from a vein in your arm.  Does this test pose any risks?  Taking a blood sample with a needle carries risks that include bleeding, infection, bruising, or feeling dizzy. When the needle pricks your arm, you may feel a slight stinging sensation or pain. Afterward, the site may be slightly sore.   What might affect my test results?  Levels may be higher in people with other problems. They include infection, heart attack (myocardial infarction), liver disease, cancer, and injury.  How do I get ready for this test?  You don't need to prepare for this test.   © 3478-2417 [employer]. [address] [place] [address], [place] [address]. All rights reserved. This information is not intended as a substitute for professional medical care. Always follow  your healthcare professional's instructions.

## 2017-10-18 NOTE — PT/OT/SLP DISCHARGE
Occupational Therapy Discharge Summary    Tona Dey  MRN: [id number]   Acute deep vein thrombosis (DVT) of popliteal vein of left lower extremity   Patient Discharged from acute Occupational Therapy on 10/10/17.  Please refer to prior OT note dated on 10/9/17 for functional status.     Assessment:   Patient appropriate for care in another setting.  GOALS:    Occupational Therapy Goals        Problem: Occupational Therapy Goal    Goal Priority Disciplines Outcome Interventions   Occupational Therapy Goal     OT, PT/OT Ongoing (interventions implemented as appropriate)    Description:  Goals to be met by: 10/18/17     Patient will increase functional independence with ADLs by performing:    REVISED:    UE Dressing with Minimal Assistance.  Grooming while standing with Minimal Assistance.  Toileting from bedside commode with Moderate Assistance for hygiene and clothing management.   Supine to sit with Stand-by Assistance.  Stand pivot transfers with Minimal Assistance.                   Reasons for Discontinuation of Therapy Services  Transfer to alternate level of care.      Plan:  Patient Discharged to: Skilled Nursing Facility.

## 2017-10-27 ENCOUNTER — HOSPITAL ENCOUNTER (OUTPATIENT)
Facility: HOSPITAL | Age: 82
Discharge: HOME | End: 2017-10-29
Attending: EMERGENCY MEDICINE | Admitting: HOSPITALIST
Payer: MEDICARE

## 2017-10-27 DIAGNOSIS — G20.C PARKINSONISM, UNSPECIFIED PARKINSONISM TYPE: ICD-10-CM

## 2017-10-27 DIAGNOSIS — Z79.01 LONG-TERM (CURRENT) USE OF ANTICOAGULANTS: ICD-10-CM

## 2017-10-27 DIAGNOSIS — R40.20 LOC (LOSS OF CONSCIOUSNESS): Primary | ICD-10-CM

## 2017-10-27 DIAGNOSIS — I82.432 ACUTE DEEP VEIN THROMBOSIS (DVT) OF POPLITEAL VEIN OF LEFT LOWER EXTREMITY: ICD-10-CM

## 2017-10-27 DIAGNOSIS — N18.2 CKD (CHRONIC KIDNEY DISEASE) STAGE 2, GFR 60-89 ML/MIN: Chronic | ICD-10-CM

## 2017-10-27 DIAGNOSIS — G90.9 AUTONOMIC INSTABILITY: Chronic | ICD-10-CM

## 2017-10-27 DIAGNOSIS — R05.9 COUGH: ICD-10-CM

## 2017-10-27 LAB
ALBUMIN SERPL BCP-MCNC: 3.1 G/DL
ALP SERPL-CCNC: 63 U/L
ALT SERPL W/O P-5'-P-CCNC: 14 U/L
ANION GAP SERPL CALC-SCNC: 8 MMOL/L
AST SERPL-CCNC: 14 U/L
BACTERIA #/AREA URNS AUTO: NORMAL /HPF
BASOPHILS # BLD AUTO: 0.05 K/UL
BASOPHILS NFR BLD: 0.8 %
BILIRUB SERPL-MCNC: 1.4 MG/DL
BILIRUB UR QL STRIP: NEGATIVE
BNP SERPL-MCNC: 100 PG/ML
BUN SERPL-MCNC: 16 MG/DL
CALCIUM SERPL-MCNC: 9.3 MG/DL
CHLORIDE SERPL-SCNC: 107 MMOL/L
CLARITY UR REFRACT.AUTO: CLEAR
CO2 SERPL-SCNC: 25 MMOL/L
COLOR UR AUTO: ABNORMAL
CREAT SERPL-MCNC: 1.1 MG/DL
DIFFERENTIAL METHOD: ABNORMAL
EOSINOPHIL # BLD AUTO: 0.1 K/UL
EOSINOPHIL NFR BLD: 1 %
ERYTHROCYTE [DISTWIDTH] IN BLOOD BY AUTOMATED COUNT: 12.4 %
EST. GFR  (AFRICAN AMERICAN): >60 ML/MIN/1.73 M^2
EST. GFR  (NON AFRICAN AMERICAN): >60 ML/MIN/1.73 M^2
GLUCOSE SERPL-MCNC: 87 MG/DL
GLUCOSE UR QL STRIP: NEGATIVE
HCT VFR BLD AUTO: 37.1 %
HGB BLD-MCNC: 11.6 G/DL
HGB UR QL STRIP: NEGATIVE
IMM GRANULOCYTES # BLD AUTO: 0.07 K/UL
IMM GRANULOCYTES NFR BLD AUTO: 1.2 %
INR PPP: 2
KETONES UR QL STRIP: NEGATIVE
LEUKOCYTE ESTERASE UR QL STRIP: ABNORMAL
LYMPHOCYTES # BLD AUTO: 1.8 K/UL
LYMPHOCYTES NFR BLD: 30.1 %
MAGNESIUM SERPL-MCNC: 1.7 MG/DL
MCH RBC QN AUTO: 30.6 PG
MCHC RBC AUTO-ENTMCNC: 31.3 G/DL
MCV RBC AUTO: 98 FL
MICROSCOPIC COMMENT: NORMAL
MONOCYTES # BLD AUTO: 0.2 K/UL
MONOCYTES NFR BLD: 4.1 %
NEUTROPHILS # BLD AUTO: 3.7 K/UL
NEUTROPHILS NFR BLD: 62.8 %
NITRITE UR QL STRIP: NEGATIVE
NRBC BLD-RTO: 0 /100 WBC
PH UR STRIP: 7 [PH] (ref 5–8)
PHOSPHATE SERPL-MCNC: 2.6 MG/DL
PLATELET # BLD AUTO: 154 K/UL
PMV BLD AUTO: 10.3 FL
POCT GLUCOSE: 97 MG/DL (ref 70–110)
POTASSIUM SERPL-SCNC: 4.4 MMOL/L
PROT SERPL-MCNC: 6.9 G/DL
PROT UR QL STRIP: NEGATIVE
PROTHROMBIN TIME: 20.5 SEC
RBC # BLD AUTO: 3.79 M/UL
RBC #/AREA URNS AUTO: 2 /HPF (ref 0–4)
SODIUM SERPL-SCNC: 140 MMOL/L
SP GR UR STRIP: 1.01 (ref 1–1.03)
SQUAMOUS #/AREA URNS AUTO: 1 /HPF
TROPONIN I SERPL DL<=0.01 NG/ML-MCNC: <0.006 NG/ML
URN SPEC COLLECT METH UR: ABNORMAL
UROBILINOGEN UR STRIP-ACNC: NEGATIVE EU/DL
WBC # BLD AUTO: 5.92 K/UL
WBC #/AREA URNS AUTO: 2 /HPF (ref 0–5)

## 2017-10-27 PROCEDURE — 99285 EMERGENCY DEPT VISIT HI MDM: CPT | Mod: ,,, | Performed by: PHYSICIAN ASSISTANT

## 2017-10-27 PROCEDURE — 82962 GLUCOSE BLOOD TEST: CPT

## 2017-10-27 PROCEDURE — 93010 ELECTROCARDIOGRAM REPORT: CPT | Mod: ,,, | Performed by: INTERNAL MEDICINE

## 2017-10-27 PROCEDURE — A4216 STERILE WATER/SALINE, 10 ML: HCPCS | Performed by: PHYSICIAN ASSISTANT

## 2017-10-27 PROCEDURE — 96360 HYDRATION IV INFUSION INIT: CPT

## 2017-10-27 PROCEDURE — 93005 ELECTROCARDIOGRAM TRACING: CPT

## 2017-10-27 PROCEDURE — 96361 HYDRATE IV INFUSION ADD-ON: CPT

## 2017-10-27 PROCEDURE — 83880 ASSAY OF NATRIURETIC PEPTIDE: CPT

## 2017-10-27 PROCEDURE — 99219 PR INITIAL OBSERVATION CARE,LEVL II: CPT | Mod: ,,, | Performed by: PHYSICIAN ASSISTANT

## 2017-10-27 PROCEDURE — 84484 ASSAY OF TROPONIN QUANT: CPT

## 2017-10-27 PROCEDURE — 80053 COMPREHEN METABOLIC PANEL: CPT

## 2017-10-27 PROCEDURE — 85610 PROTHROMBIN TIME: CPT

## 2017-10-27 PROCEDURE — 81001 URINALYSIS AUTO W/SCOPE: CPT

## 2017-10-27 PROCEDURE — 25000003 PHARM REV CODE 250: Performed by: PHYSICIAN ASSISTANT

## 2017-10-27 PROCEDURE — 85025 COMPLETE CBC W/AUTO DIFF WBC: CPT

## 2017-10-27 PROCEDURE — G0378 HOSPITAL OBSERVATION PER HR: HCPCS

## 2017-10-27 PROCEDURE — 83735 ASSAY OF MAGNESIUM: CPT

## 2017-10-27 PROCEDURE — 84100 ASSAY OF PHOSPHORUS: CPT

## 2017-10-27 PROCEDURE — 99285 EMERGENCY DEPT VISIT HI MDM: CPT | Mod: 25

## 2017-10-27 RX ORDER — SODIUM,POTASSIUM PHOSPHATES 280-250MG
1 POWDER IN PACKET (EA) ORAL ONCE
Status: COMPLETED | OUTPATIENT
Start: 2017-10-27 | End: 2017-10-27

## 2017-10-27 RX ORDER — ONDANSETRON 8 MG/1
8 TABLET, ORALLY DISINTEGRATING ORAL EVERY 8 HOURS PRN
Status: DISCONTINUED | OUTPATIENT
Start: 2017-10-27 | End: 2017-10-29 | Stop reason: HOSPADM

## 2017-10-27 RX ORDER — ACETAMINOPHEN 325 MG/1
650 TABLET ORAL EVERY 6 HOURS PRN
Status: DISCONTINUED | OUTPATIENT
Start: 2017-10-27 | End: 2017-10-29 | Stop reason: HOSPADM

## 2017-10-27 RX ORDER — TRAMADOL HYDROCHLORIDE 50 MG/1
50 TABLET ORAL EVERY 12 HOURS PRN
Status: ON HOLD | COMMUNITY
End: 2018-02-02 | Stop reason: HOSPADM

## 2017-10-27 RX ORDER — SODIUM CHLORIDE 0.9 % (FLUSH) 0.9 %
3 SYRINGE (ML) INJECTION EVERY 8 HOURS
Status: DISCONTINUED | OUTPATIENT
Start: 2017-10-27 | End: 2017-10-29 | Stop reason: HOSPADM

## 2017-10-27 RX ORDER — TRAMADOL HYDROCHLORIDE 50 MG/1
50 TABLET ORAL
Status: COMPLETED | OUTPATIENT
Start: 2017-10-27 | End: 2017-10-27

## 2017-10-27 RX ORDER — ONDANSETRON 2 MG/ML
4 INJECTION INTRAMUSCULAR; INTRAVENOUS EVERY 8 HOURS PRN
Status: DISCONTINUED | OUTPATIENT
Start: 2017-10-27 | End: 2017-10-29 | Stop reason: HOSPADM

## 2017-10-27 RX ORDER — MIDODRINE HYDROCHLORIDE 5 MG/1
5 TABLET ORAL 3 TIMES DAILY
Status: DISCONTINUED | OUTPATIENT
Start: 2017-10-27 | End: 2017-10-29 | Stop reason: HOSPADM

## 2017-10-27 RX ORDER — WARFARIN SODIUM 5 MG/1
5 TABLET ORAL DAILY
Status: DISCONTINUED | OUTPATIENT
Start: 2017-10-27 | End: 2017-10-29 | Stop reason: HOSPADM

## 2017-10-27 RX ORDER — CARBIDOPA AND LEVODOPA 25; 100 MG/1; MG/1
1 TABLET ORAL 3 TIMES DAILY
Status: DISCONTINUED | OUTPATIENT
Start: 2017-10-27 | End: 2017-10-29 | Stop reason: HOSPADM

## 2017-10-27 RX ADMIN — CARBIDOPA AND LEVODOPA 1 TABLET: 25; 100 TABLET ORAL at 09:10

## 2017-10-27 RX ADMIN — SODIUM CHLORIDE, PRESERVATIVE FREE 3 ML: 5 INJECTION INTRAVENOUS at 09:10

## 2017-10-27 RX ADMIN — TRAMADOL HYDROCHLORIDE 50 MG: 50 TABLET, FILM COATED ORAL at 12:10

## 2017-10-27 RX ADMIN — WARFARIN SODIUM 5 MG: 5 TABLET ORAL at 05:10

## 2017-10-27 RX ADMIN — POTASSIUM & SODIUM PHOSPHATES POWDER PACK 280-160-250 MG 1 PACKET: 280-160-250 PACK at 05:10

## 2017-10-27 RX ADMIN — MIDODRINE HYDROCHLORIDE 5 MG: 5 TABLET ORAL at 09:10

## 2017-10-27 RX ADMIN — SODIUM CHLORIDE 1000 ML: 0.9 INJECTION, SOLUTION INTRAVENOUS at 03:10

## 2017-10-27 NOTE — ED NOTES
Pt transported to room 1123 A via stretcher with A Spako,PCT Pt condition stable on transport, pt belonging are with wife and pt 's wife notified of room nuimber

## 2017-10-27 NOTE — ED PROVIDER NOTES
Encounter Date: 10/27/2017       History     Chief Complaint   Patient presents with    Loss of Consciousness     Pt passed out while on the toilet having a BM.      HPI  Review of patient's allergies indicates:  No Known Allergies  Past Medical History:   Diagnosis Date    Anemia     Chronic back pain     Hypertension     Lumbago     Orthostatic hypotension dysautonomic syndrome     Parkinson disease     Renal disorder      Past Surgical History:   Procedure Laterality Date    BACK SURGERY  1961    ROTATOR CUFF REPAIR       Family History   Problem Relation Age of Onset    No Known Problems Mother     Cancer Father      Social History   Substance Use Topics    Smoking status: Former Smoker     Quit date: 8/25/1985    Smokeless tobacco: Never Used    Alcohol use 1.2 oz/week     2 Shots of liquor per week     Review of Systems    Physical Exam     Initial Vitals [10/27/17 0929]   BP Pulse Resp Temp SpO2   135/74 76 16 98.2 °F (36.8 °C) 97 %      MAP       94.33         Physical Exam    ED Course   Procedures  Labs Reviewed   CBC W/ AUTO DIFFERENTIAL - Abnormal; Notable for the following:        Result Value    RBC 3.79 (*)     Hemoglobin 11.6 (*)     Hematocrit 37.1 (*)     MCHC 31.3 (*)     All other components within normal limits   COMPREHENSIVE METABOLIC PANEL - Abnormal; Notable for the following:     Albumin 3.1 (*)     Total Bilirubin 1.4 (*)     All other components within normal limits   B-TYPE NATRIURETIC PEPTIDE - Abnormal; Notable for the following:      (*)     All other components within normal limits   URINALYSIS, REFLEX TO URINE CULTURE - Abnormal; Notable for the following:     Leukocytes, UA Trace (*)     All other components within normal limits    Narrative:     Preferred Collection Type->Urine, Clean Catch   PROTIME-INR - Abnormal; Notable for the following:     Prothrombin Time 20.5 (*)     INR 2.0 (*)     All other components within normal limits   PHOSPHORUS - Abnormal;  Notable for the following:     Phosphorus 2.6 (*)     All other components within normal limits   TROPONIN I   MAGNESIUM   URINALYSIS MICROSCOPIC    Narrative:     Preferred Collection Type->Urine, Clean Catch   POCT GLUCOSE   POCT GLUCOSE MONITORING CONTINUOUS                               ED Course      Clinical Impression:   {Add your Clinical Impression here. If you haven't documented one yet, please pend the note, finalize a Clinical Impression, and refresh your note before signing.:[id number]}

## 2017-10-27 NOTE — ED PROVIDER NOTES
"Encounter Date: 10/27/2017    SCRIBE #1 NOTE: I, Dary Mejía, am scribing for, and in the presence of,  Dr. De La Garza. I have scribed the following portions of the note - the EKG reading.       History     Chief Complaint   Patient presents with    Loss of Consciousness     Pt passed out while on the toilet having a BM.      Patient is a 86-year-old male with a past medical history of Parkinson's disease, orthostatic hypotension, anemia, CKD, Parkinson's, who presents the ED with LOC.  Patient was brought in by EMS.  Wife states that patient was with home health nurse during this episode.  Patient has been having episodes of tremors since being diagnosed with Parkinson's.  I called home health nurse Wayne on the phone, and he states that patient was sitting on the toilet, "went back and stiff" and saw the "white of his eyes".  Wayne states that patient had the tremors that he normally has and denies any significant jerky movements.  This episode lasted approximately 40 seconds.  He states patient never fell.  Wayne brought patient back to the bed and patient seemed weak. Patient states that he recalls the episode and had weakness and fatigue at the time, but denies any pain.  Currently he only complains of left hip and knee pains but has been able to continue physical therapy. He endorses a cough for years that is a little worse.  He denies any fever, chills, chest pain, SOB, urinary difficulties, abdominal pain.          Review of patient's allergies indicates:  No Known Allergies  Past Medical History:   Diagnosis Date    Anemia     Chronic back pain     Hypertension     Lumbago     Orthostatic hypotension dysautonomic syndrome     Parkinson disease     Renal disorder      Past Surgical History:   Procedure Laterality Date    BACK SURGERY  1961    ROTATOR CUFF REPAIR       Family History   Problem Relation Age of Onset    No Known Problems Mother     Cancer Father      Social History   Substance Use " Topics    Smoking status: Former Smoker     Quit date: 8/25/1985    Smokeless tobacco: Never Used    Alcohol use 1.2 oz/week     2 Shots of liquor per week     Review of Systems   Constitutional: Positive for fatigue. Negative for fever.   HENT: Negative for nosebleeds and sore throat.    Eyes: Negative for redness.   Respiratory: Positive for cough. Negative for shortness of breath.    Cardiovascular: Negative for chest pain and leg swelling.   Gastrointestinal: Negative for abdominal pain and vomiting.   Endocrine: Negative for polyphagia.   Genitourinary: Negative for hematuria.   Musculoskeletal: Negative for back pain.   Skin: Negative for rash.   Neurological: Positive for tremors and weakness.       Physical Exam     Initial Vitals [10/27/17 0929]   BP Pulse Resp Temp SpO2   135/74 76 16 98.2 °F (36.8 °C) 97 %      MAP       94.33         Physical Exam    Vitals reviewed.  Constitutional: He appears well-developed and well-nourished. He is not diaphoretic. No distress.   HENT:   Head: Normocephalic and atraumatic.   Nose: Nose normal.   Eyes: Conjunctivae and EOM are normal.   Neck: Normal range of motion.   Cardiovascular: Normal rate, regular rhythm and normal heart sounds. Exam reveals no friction rub.    No murmur heard.  Pulses:       Dorsalis pedis pulses are 2+ on the right side, and 2+ on the left side.   No peripheral edema.   Pulmonary/Chest: Breath sounds normal. No respiratory distress. He has no wheezes. He has no rales.   Abdominal: Soft. Bowel sounds are normal. He exhibits no distension. There is no tenderness.   Musculoskeletal: Normal range of motion.   Neurological: He is alert and oriented to person, place, and time. He has normal strength. No sensory deficit.   Alert and Oriented to time, month, and person. Not place (thinks he is at [facility]).   Skin: Skin is warm and dry. No erythema.   Psychiatric: He has a normal mood and affect. Thought content normal.         ED Course    Procedures  Labs Reviewed   CBC W/ AUTO DIFFERENTIAL - Abnormal; Notable for the following:        Result Value    RBC 3.79 (*)     Hemoglobin 11.6 (*)     Hematocrit 37.1 (*)     MCHC 31.3 (*)     Immature Granulocytes 1.2 (*)     Immature Grans (Abs) 0.07 (*)     Mono # 0.2 (*)     All other components within normal limits   COMPREHENSIVE METABOLIC PANEL - Abnormal; Notable for the following:     Albumin 3.1 (*)     Total Bilirubin 1.4 (*)     All other components within normal limits   B-TYPE NATRIURETIC PEPTIDE - Abnormal; Notable for the following:      (*)     All other components within normal limits   URINALYSIS, REFLEX TO URINE CULTURE - Abnormal; Notable for the following:     Leukocytes, UA Trace (*)     All other components within normal limits    Narrative:     Preferred Collection Type->Urine, Clean Catch   PROTIME-INR - Abnormal; Notable for the following:     Prothrombin Time 20.5 (*)     INR 2.0 (*)     All other components within normal limits   PHOSPHORUS - Abnormal; Notable for the following:     Phosphorus 2.6 (*)     All other components within normal limits   TROPONIN I   MAGNESIUM   URINALYSIS MICROSCOPIC    Narrative:     Preferred Collection Type->Urine, Clean Catch   POCT GLUCOSE   POCT GLUCOSE MONITORING CONTINUOUS     EKG Readings: (Independently Interpreted)   Rhythm: Normal Sinus Rhythm. Heart Rate: 81 bpm.   No ST segment or T wave changes.          Medical Decision Making:   History:   Old Medical Records: I decided to obtain old medical records.  Independently Interpreted Test(s):   I have ordered and independently interpreted EKG Reading(s) - see prior notes  Clinical Tests:   Lab Tests: Ordered and Reviewed  Radiological Study: Ordered and Reviewed  Medical Tests: Ordered and Reviewed    Imaging Results          CT Head Without Contrast (Final result)  Result time 10/27/17 15:58:31    Final result by Casimiro Flores MD (10/27/17 15:58:31)                 Impression:         No evidence of acute intracranial pathology.    Mild chronic microvascular ischemic changes.    Chronic left sphenoid sinusitis.   ______________________________________     Electronically signed by resident: CANELO SIMON  Date:     10/27/17  Time:    15:31            As the supervising and teaching physician, I personally reviewed the images and resident's interpretation and I agree with the findings.          Electronically signed by: MALIA KELLY MD  Date:     10/27/17  Time:    15:58              Narrative:    CLINICAL INDICATION: 86 year old M with loss of consciousness at home.     TECHNIQUE: CT head without contrast. Axial CT images obtained throughout the head without intravenous contrast. Sagittal and coronal reconstructions were performed.    COMPARISON: CT head 10/07/2017 and 08/31/2017    FINDINGS:  Intracranial compartment:    Ventricles and sulci are stable in size and there is no evidence of hydrocephalus.  Stable mild patchy hypoattenuation within the supra-tentorial periventricular white matter most compatible with chronic microvascular ischemic change.  No parenchymal mass, hemorrhage, or recent or remote major vascular distribution infarct.     There is scattered atherosclerotic calcification. No extra-axial blood or fluid collections.      Skull/extracranial contents (limited evaluation): No fracture. Mastoid air cells are clear.  Left sphenoid sinus demonstrates stable complete opacification with surrounding osseous thickening and sclerosis.  Rest of the paranasal sinuses are clear.                             X-Ray Chest 1 View (Final result)  Result time 10/27/17 11:49:42    Final result by Abundio Berrios MD (10/27/17 11:49:42)                 Impression:     No significant intrathoracic abnormality.  Allowing for a poorer inspiratory depth on the current examination, no significant detrimental interval change in the appearance of the chest since 10/14/17 is appreciated.      Electronically  signed by: Abundio Berrios MD  Date:     10/27/17  Time:    11:49              Narrative:    Comparison is made to 10/14/17.     The heart size is normal, as is the appearance of the pulmonary vascularity.  Cardiomediastinal silhouette demonstrates no detrimental change since the prior exam referenced above, with note again made of some tortuosity of the thoracic aorta.  Lung zones are clear, and free of significant airspace consolidation or volume loss.  No pleural fluid.  No pneumothorax.  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                                 APC / Resident Notes:   On exam, vital signs stable.  Alert and oriented.  Regular rate and rhythm.  Lungs clear to auscultations bilaterally.  Abdomen soft, nontender nondistended.  No facial asymmetry.  Good finger .  Full range of motion and good strength throughout.  DDX includes but is not limited to electrolyte abnormalities, dehydration, heart failure, ACS, arrhythmias, vasovagal, orthostatic hypotension, kidney injury, seizure, post-ictal, intracerebral hemorrhage or lesion.  Will order labs and imaging, give fluids, and continue to monitor.    Blood pressure has been maintained above 120 mmHg systolic BP throughout ED visit.    UA with no signs of infection.  CBC with no leukocytosis.  CMP with anemia at baseline with H&H at 11/37.  CMP with no electrolyte abnormalities.  Creatinine at 1.1.  BNP at 100.  Negative troponin.  INR therapeutic at 2.0.    Chest x-ray with no significant intrathoracic abnormality.  CT head with no evidence of acute intracranial pathology.    No clear etiology of LOC at this time given normal work up. Patient will be placed in observation with hospital medicine for further evaluation and management of loss of consciousness.       Scribe Attestation:   Scribe #1: I performed the above scribed  service and the documentation accurately describes the services I performed. I attest to the accuracy of the note.            ED Course      Clinical Impression:   The primary encounter diagnosis was LOC (loss of consciousness). A diagnosis of Cough was also pertinent to this visit.    Disposition:   Disposition: Placed in Observation                        Sarah Bates PA-C  10/27/17 [id number]

## 2017-10-27 NOTE — H&P
"[facility] Hospital Medicine  History & Physical    Patient Name: Tona Dey  MRN: [id number]  Admission Date: 10/27/2017  Attending Physician: Giovanni Delgado MD   Primary Care Provider: Primary Doctor [facility] Medicine Team: [facility] MED  Elida Denton PA-C     Patient information was obtained from patient, spouse/SO and ER records.     Subjective:     Principal Problem:LOC (loss of consciousness)    Chief Complaint:   Chief Complaint   Patient presents with    Loss of Consciousness     Pt passed out while on the toilet having a BM.         HPI: 86 year old male with a PMHx of anemia, CKD, Parkinson's, and orthostatic hypotension presenting to the ED by EMS with wife at bedside after reported episode of LOC at home. Wife reports home health nurse was present during episode. Per HH nurse,  patient was sitting on the toilet having a BM when the patient "went back and stiff." HH nurse denies abnormal tremors (tremors at baseline 2/2 Parkinson's) and jerking movements. Episode lasted ~40 seconds. Pt did not fall or experience trauma during episode. Per wife, HH nurse called for her after moving patient back into bed; SBP ~ "80s." Patient states that he recalls the episode and had weakness and fatigue at the time. Wife states "it was probably his blood pressure." At the time of my exam, pt endorses myalgias (chronic L hip and knee pain ). Pt has been working with HH PT. Pt denies chest pain, SOB, abdominal pain, N/V/D, fever, chills, weakness, and urinary complaints.      In the ED, VSS. Afebrile without leukocytosis. Labs remarkable for Hgb 11.6 (at baseline) and phos 2.6. Trop WNL. . EKG shows NSR (81). CT head without acute findings. CXR shows stable findings. UA unremarkable.    Past Medical History:   Diagnosis Date    Anemia     Chronic back pain     Hypertension     Lumbago     Orthostatic hypotension dysautonomic syndrome     Parkinson disease     Renal disorder  "       Past Surgical History:   Procedure Laterality Date    BACK SURGERY  1961    ROTATOR CUFF REPAIR         Review of patient's allergies indicates:  No Known Allergies    No current facility-administered medications on file prior to encounter.      Current Outpatient Prescriptions on File Prior to Encounter   Medication Sig    carbidopa-levodopa  mg (SINEMET)  mg per tablet Take 1 tablet by mouth 3 (three) times daily.    midodrine (PROAMATINE) 10 MG tablet Take 0.5 tablets (5 mg total) by mouth 3 (three) times daily.    warfarin (COUMADIN) 5 MG tablet Take 1 tablet (5 mg total) by mouth Daily.    acetaminophen (TYLENOL) 325 MG tablet Take 2 tablets (650 mg total) by mouth every 4 (four) hours as needed for Pain.    comp.stocking,knee,long,medium Misc 1 application by Misc.(Non-Drug; Combo Route) route daily as needed (Leg swelling).    lidocaine (LIDODERM) 5 % Place 1 patch onto the skin once daily. Remove & Discard patch within 12 hours or as directed by MD     Family History     Problem Relation (Age of Onset)    Cancer Father    No Known Problems Mother        Social History Main Topics    Smoking status: Former Smoker     Quit date: 8/25/1985    Smokeless tobacco: Never Used    Alcohol use 1.2 oz/week     2 Shots of liquor per week    Drug use: Unknown    Sexual activity: Yes     Partners: Female     Review of Systems   Constitutional: Negative for chills, diaphoresis, fatigue and fever.   HENT: Negative for congestion, hearing loss, sinus pressure, sore throat and trouble swallowing.    Eyes: Negative for photophobia and visual disturbance.   Respiratory: Negative for cough, chest tightness, shortness of breath and wheezing.    Cardiovascular: Negative for chest pain, palpitations and leg swelling.   Gastrointestinal: Negative for abdominal distention, abdominal pain, diarrhea, nausea and vomiting.   Endocrine: Negative for cold intolerance and heat intolerance.   Genitourinary:  Negative for difficulty urinating, dysuria, flank pain, frequency and hematuria.   Musculoskeletal: Positive for gait problem and myalgias. Negative for back pain and neck pain.   Skin: Negative for rash and wound.   Allergic/Immunologic: Negative for immunocompromised state.   Neurological: Positive for tremors and weakness. Negative for dizziness, syncope, numbness and headaches.   Hematological: Negative for adenopathy. Does not bruise/bleed easily.   Psychiatric/Behavioral: Negative for agitation, confusion and dysphoric mood. The patient is not nervous/anxious.      Objective:     Vital Signs (Most Recent):  Temp: 98.2 °F (36.8 °C) (10/27/17 1642)  Pulse: 78 (10/27/17 1642)  Resp: 20 (10/27/17 1642)  BP: (!) 148/83 (10/27/17 1642)  SpO2: 97 % (10/27/17 1642) Vital Signs (24h Range):  Temp:  [97.5 °F (36.4 °C)-98.2 °F (36.8 °C)] 98.2 °F (36.8 °C)  Pulse:  [76-88] 78  Resp:  [12-20] 20  SpO2:  [97 %-99 %] 97 %  BP: (126-182)/(65-98) 148/83     Weight: 96.6 kg (213 lb)  Body mass index is 32.39 kg/m².    Physical Exam   Constitutional: He is oriented to person, place, and time. He appears well-developed. No distress.   HENT:   Head: Normocephalic and atraumatic.   Eyes: Conjunctivae and EOM are normal. Right eye exhibits no discharge. Left eye exhibits no discharge. No scleral icterus.   Neck: Normal range of motion. Neck supple. No JVD present. No tracheal deviation present.   Cardiovascular: Normal rate, regular rhythm, normal heart sounds and intact distal pulses.    No murmur heard.  Pulmonary/Chest: Effort normal and breath sounds normal. No respiratory distress. He has no wheezes.   Abdominal: Soft. Bowel sounds are normal. He exhibits no distension. There is no tenderness.   Musculoskeletal: Normal range of motion. He exhibits edema (trace). He exhibits no tenderness.   Neurological: He is alert and oriented to person, place, and time. No cranial nerve deficit.   Skin: Skin is warm and dry. He is not  diaphoretic. No erythema.   Psychiatric: He has a normal mood and affect. His behavior is normal.   Vitals reviewed.       Significant Labs: All pertinent labs within the past 24 hours have been reviewed.    Significant Imaging: I have reviewed all pertinent imaging results/findings within the past 24 hours.    Assessment/Plan:     * LOC (loss of consciousness)    Likely vagal in setting of known orthostatic hypotension  - Episode of LOC during BM on toilet  - SBP in 80s s/p episode   - Afebrile without leukocytosis  - CXR shows stable findings. UA unremarkable  - EKG shows NSR (81). Trop WNL  - CT head without acute findings  - 2d CFD from 10/2/2017 shows pEF with normal diastolic function; will not repeat  - Will get EEG although clinic suspicion for seizure is very low  - Telemetry         Long-term (current) use of anticoagulants    - INR 2.0 on admit  - Will monitor daily          Acute deep vein thrombosis (DVT) of popliteal vein of left lower extremity    - Continue home coumadin 5 mg daily          CKD (chronic kidney disease) stage 2, GFR 60-89 ml/min    - Cr 1.1 on admit  - Avoid nephrotoxic agents  - Will continue to monitor          Parkinsonism    - Continue home Sinemet  - Needs outpatient f/u with neurology          Autonomic instability    - Midodrine for SBP <140            VTE Risk Mitigation         Ordered     warfarin (COUMADIN) tablet 5 mg  Daily     Route:  Oral        10/27/17 1635     High Risk of VTE  Once      10/27/17 1635     Place TIGRE hose  Until discontinued      10/27/17 1635     Place sequential compression device  Until discontinued      10/27/17 1635     Reason for No Pharmacological VTE Prophylaxis  Once      10/27/17 1635             Elida Denton PA-C  Department of Hospital Medicine   [facility]

## 2017-10-27 NOTE — ASSESSMENT & PLAN NOTE
Likely vagal in setting of known orthostatic hypotension  - Episode of LOC during BM on toilet  - SBP in 80s s/p episode   - Afebrile without leukocytosis  - CXR shows stable findings. UA unremarkable  - EKG shows NSR (81). Trop WNL  - CT head without acute findings  - 2d CFD from 10/2/2017 shows pEF with normal diastolic function; will not repeat  - Will get EEG although clinic suspicion for seizure is very low  - Telemetry

## 2017-10-27 NOTE — HPI
"86 year old male with a PMHx of anemia, CKD, Parkinson's, and orthostatic hypotension presenting to the ED by EMS with wife at bedside after reported episode of LOC at home. Wife reports home health nurse was present during episode. Per HH nurse,  patient was sitting on the toilet having a BM when the patient "went back and stiff." HH nurse denies abnormal tremors (tremors at baseline 2/2 Parkinson's) and jerking movements. Episode lasted ~40 seconds. Pt did not fall or experience trauma during episode. Per wife, HH nurse called for her after moving patient back into bed; SBP ~ "80s." Patient states that he recalls the episode and had weakness and fatigue at the time. Wife states "it was probably his blood pressure." At the time of my exam, pt endorses myalgias (chronic L hip and knee pain ). Pt has been working with  PT. Pt denies chest pain, SOB, abdominal pain, N/V/D, fever, chills, weakness, and urinary complaints.      In the ED, VSS. Afebrile without leukocytosis. Labs remarkable for Hgb 11.6 (at baseline) and phos 2.6. Trop WNL. . EKG shows NSR (81). CT head without acute findings. CXR shows stable findings. UA unremarkable.  "

## 2017-10-27 NOTE — SUBJECTIVE & OBJECTIVE
Past Medical History:   Diagnosis Date    Anemia     Chronic back pain     Hypertension     Lumbago     Orthostatic hypotension dysautonomic syndrome     Parkinson disease     Renal disorder        Past Surgical History:   Procedure Laterality Date    BACK SURGERY  1961    ROTATOR CUFF REPAIR         Review of patient's allergies indicates:  No Known Allergies    No current facility-administered medications on file prior to encounter.      Current Outpatient Prescriptions on File Prior to Encounter   Medication Sig    carbidopa-levodopa  mg (SINEMET)  mg per tablet Take 1 tablet by mouth 3 (three) times daily.    midodrine (PROAMATINE) 10 MG tablet Take 0.5 tablets (5 mg total) by mouth 3 (three) times daily.    warfarin (COUMADIN) 5 MG tablet Take 1 tablet (5 mg total) by mouth Daily.    acetaminophen (TYLENOL) 325 MG tablet Take 2 tablets (650 mg total) by mouth every 4 (four) hours as needed for Pain.    comp.stocking,knee,long,medium Misc 1 application by Misc.(Non-Drug; Combo Route) route daily as needed (Leg swelling).    lidocaine (LIDODERM) 5 % Place 1 patch onto the skin once daily. Remove & Discard patch within 12 hours or as directed by MD     Family History     Problem Relation (Age of Onset)    Cancer Father    No Known Problems Mother        Social History Main Topics    Smoking status: Former Smoker     Quit date: 8/25/1985    Smokeless tobacco: Never Used    Alcohol use 1.2 oz/week     2 Shots of liquor per week    Drug use: Unknown    Sexual activity: Yes     Partners: Female     Review of Systems   Constitutional: Negative for chills, diaphoresis, fatigue and fever.   HENT: Negative for congestion, hearing loss, sinus pressure, sore throat and trouble swallowing.    Eyes: Negative for photophobia and visual disturbance.   Respiratory: Negative for cough, chest tightness, shortness of breath and wheezing.    Cardiovascular: Negative for chest pain, palpitations and  leg swelling.   Gastrointestinal: Negative for abdominal distention, abdominal pain, diarrhea, nausea and vomiting.   Endocrine: Negative for cold intolerance and heat intolerance.   Genitourinary: Negative for difficulty urinating, dysuria, flank pain, frequency and hematuria.   Musculoskeletal: Positive for gait problem and myalgias. Negative for back pain and neck pain.   Skin: Negative for rash and wound.   Allergic/Immunologic: Negative for immunocompromised state.   Neurological: Positive for tremors and weakness. Negative for dizziness, syncope, numbness and headaches.   Hematological: Negative for adenopathy. Does not bruise/bleed easily.   Psychiatric/Behavioral: Negative for agitation, confusion and dysphoric mood. The patient is not nervous/anxious.      Objective:     Vital Signs (Most Recent):  Temp: 98.2 °F (36.8 °C) (10/27/17 1642)  Pulse: 78 (10/27/17 1642)  Resp: 20 (10/27/17 1642)  BP: (!) 148/83 (10/27/17 1642)  SpO2: 97 % (10/27/17 1642) Vital Signs (24h Range):  Temp:  [97.5 °F (36.4 °C)-98.2 °F (36.8 °C)] 98.2 °F (36.8 °C)  Pulse:  [76-88] 78  Resp:  [12-20] 20  SpO2:  [97 %-99 %] 97 %  BP: (126-182)/(65-98) 148/83     Weight: 96.6 kg (213 lb)  Body mass index is 32.39 kg/m².    Physical Exam   Constitutional: He is oriented to person, place, and time. He appears well-developed. No distress.   HENT:   Head: Normocephalic and atraumatic.   Eyes: Conjunctivae and EOM are normal. Right eye exhibits no discharge. Left eye exhibits no discharge. No scleral icterus.   Neck: Normal range of motion. Neck supple. No JVD present. No tracheal deviation present.   Cardiovascular: Normal rate, regular rhythm, normal heart sounds and intact distal pulses.    No murmur heard.  Pulmonary/Chest: Effort normal and breath sounds normal. No respiratory distress. He has no wheezes.   Abdominal: Soft. Bowel sounds are normal. He exhibits no distension. There is no tenderness.   Musculoskeletal: Normal range of  motion. He exhibits edema (trace). He exhibits no tenderness.   Neurological: He is alert and oriented to person, place, and time. No cranial nerve deficit.   Skin: Skin is warm and dry. He is not diaphoretic. No erythema.   Psychiatric: He has a normal mood and affect. His behavior is normal.   Vitals reviewed.       Significant Labs: All pertinent labs within the past 24 hours have been reviewed.    Significant Imaging: I have reviewed all pertinent imaging results/findings within the past 24 hours.

## 2017-10-27 NOTE — ED NOTES
Pt identifiers Tona Dey were checked and are correct  LOC: The patient is awake, alert, aware of environment with an appropriate affect. Oriented x3, speaking appropriately  APPEARANCE: Pt resting comfortably, in no acute distress, pt is clean and well groomed, clothing properly fastened  SKIN: Skin warm, dry and intact, normal skin turgor, moist mucus membranes  RESPIRATORY: Airway is open and patent, respirations are spontaneous, even and unlabored, normal effort and rate Breath sounds clear angela to all lung fields on auscultation  CARDIAC: Normal rate and rhythm, 1 + peripheral edema noted, capillary refill < 3 seconds, bilateral radial pulses 2+  ABDOMEN: Soft, nontender, nondistended. Bowel sounds present to all four quad of abd on auscultation   NEUROLOGIC: PERRL, facial expression is symmetrical, patient moving all extremities spontaneously, normal sensation in all extremities when touched with a finger.  Follows all commands appropriately  MUSCULOSKELETAL States he fractured his right shoulder on month ago

## 2017-10-27 NOTE — ED TRIAGE NOTES
"09:35 Pt arrived via ambulance per stretcher from his residence  EMS reports pt was on the toilet attempting to have a bowel movement when he had a syncopal spell and passed out for approx. 1 min.  EMS states family reported pt has a history of "vagally down " and having a low blood pressure then passing out   "

## 2017-10-28 PROBLEM — G31.84 MILD COGNITIVE IMPAIRMENT: Status: ACTIVE | Noted: 2017-10-28

## 2017-10-28 LAB
ALBUMIN SERPL BCP-MCNC: 3.1 G/DL
ALP SERPL-CCNC: 61 U/L
ALT SERPL W/O P-5'-P-CCNC: <5 U/L
ANION GAP SERPL CALC-SCNC: 7 MMOL/L
AST SERPL-CCNC: 13 U/L
BASOPHILS # BLD AUTO: 0.02 K/UL
BASOPHILS NFR BLD: 0.5 %
BILIRUB SERPL-MCNC: 0.9 MG/DL
BUN SERPL-MCNC: 14 MG/DL
CALCIUM SERPL-MCNC: 9.3 MG/DL
CHLORIDE SERPL-SCNC: 106 MMOL/L
CO2 SERPL-SCNC: 26 MMOL/L
CREAT SERPL-MCNC: 0.9 MG/DL
DIFFERENTIAL METHOD: ABNORMAL
EOSINOPHIL # BLD AUTO: 0.1 K/UL
EOSINOPHIL NFR BLD: 2.3 %
ERYTHROCYTE [DISTWIDTH] IN BLOOD BY AUTOMATED COUNT: 12.7 %
EST. GFR  (AFRICAN AMERICAN): >60 ML/MIN/1.73 M^2
EST. GFR  (NON AFRICAN AMERICAN): >60 ML/MIN/1.73 M^2
GLUCOSE SERPL-MCNC: 94 MG/DL
HCT VFR BLD AUTO: 37.2 %
HGB BLD-MCNC: 11.6 G/DL
IMM GRANULOCYTES # BLD AUTO: 0.05 K/UL
IMM GRANULOCYTES NFR BLD AUTO: 1.1 %
INR PPP: 1.9
LYMPHOCYTES # BLD AUTO: 1.8 K/UL
LYMPHOCYTES NFR BLD: 41.4 %
MAGNESIUM SERPL-MCNC: 1.8 MG/DL
MCH RBC QN AUTO: 32 PG
MCHC RBC AUTO-ENTMCNC: 31.2 G/DL
MCV RBC AUTO: 103 FL
MONOCYTES # BLD AUTO: 0.3 K/UL
MONOCYTES NFR BLD: 6.5 %
NEUTROPHILS # BLD AUTO: 2.1 K/UL
NEUTROPHILS NFR BLD: 48.2 %
NRBC BLD-RTO: 0 /100 WBC
PHOSPHATE SERPL-MCNC: 2.8 MG/DL
PLATELET # BLD AUTO: 193 K/UL
PMV BLD AUTO: 9.7 FL
POTASSIUM SERPL-SCNC: 4.7 MMOL/L
PROT SERPL-MCNC: 6.6 G/DL
PROTHROMBIN TIME: 19.2 SEC
RBC # BLD AUTO: 3.62 M/UL
SODIUM SERPL-SCNC: 139 MMOL/L
WBC # BLD AUTO: 4.44 K/UL

## 2017-10-28 PROCEDURE — A4216 STERILE WATER/SALINE, 10 ML: HCPCS | Performed by: PHYSICIAN ASSISTANT

## 2017-10-28 PROCEDURE — 84100 ASSAY OF PHOSPHORUS: CPT

## 2017-10-28 PROCEDURE — 95816 EEG AWAKE AND DROWSY: CPT

## 2017-10-28 PROCEDURE — G0378 HOSPITAL OBSERVATION PER HR: HCPCS

## 2017-10-28 PROCEDURE — 95816 EEG AWAKE AND DROWSY: CPT | Mod: 26,,, | Performed by: PSYCHIATRY & NEUROLOGY

## 2017-10-28 PROCEDURE — 80053 COMPREHEN METABOLIC PANEL: CPT

## 2017-10-28 PROCEDURE — 99225 PR SUBSEQUENT OBSERVATION CARE,LEVEL II: CPT | Mod: ,,, | Performed by: PHYSICIAN ASSISTANT

## 2017-10-28 PROCEDURE — 85025 COMPLETE CBC W/AUTO DIFF WBC: CPT

## 2017-10-28 PROCEDURE — 83735 ASSAY OF MAGNESIUM: CPT

## 2017-10-28 PROCEDURE — 85610 PROTHROMBIN TIME: CPT

## 2017-10-28 PROCEDURE — 36415 COLL VENOUS BLD VENIPUNCTURE: CPT

## 2017-10-28 PROCEDURE — 25000003 PHARM REV CODE 250: Performed by: PHYSICIAN ASSISTANT

## 2017-10-28 RX ORDER — POLYETHYLENE GLYCOL 3350 17 G/17G
17 POWDER, FOR SOLUTION ORAL 2 TIMES DAILY
Status: DISCONTINUED | OUTPATIENT
Start: 2017-10-28 | End: 2017-10-29 | Stop reason: HOSPADM

## 2017-10-28 RX ADMIN — WARFARIN SODIUM 5 MG: 5 TABLET ORAL at 06:10

## 2017-10-28 RX ADMIN — POLYETHYLENE GLYCOL 3350 17 G: 17 POWDER, FOR SOLUTION ORAL at 08:10

## 2017-10-28 RX ADMIN — ACETAMINOPHEN 650 MG: 325 TABLET ORAL at 04:10

## 2017-10-28 RX ADMIN — CARBIDOPA AND LEVODOPA 1 TABLET: 25; 100 TABLET ORAL at 09:10

## 2017-10-28 RX ADMIN — MIDODRINE HYDROCHLORIDE 5 MG: 5 TABLET ORAL at 09:10

## 2017-10-28 RX ADMIN — SODIUM CHLORIDE, PRESERVATIVE FREE 3 ML: 5 INJECTION INTRAVENOUS at 02:10

## 2017-10-28 RX ADMIN — POLYETHYLENE GLYCOL 3350 17 G: 17 POWDER, FOR SOLUTION ORAL at 03:10

## 2017-10-28 RX ADMIN — CARBIDOPA AND LEVODOPA 1 TABLET: 25; 100 TABLET ORAL at 02:10

## 2017-10-28 NOTE — PLAN OF CARE
Spoke to Bala with [facility] ([phone]), advised pt needs w/c van tx home. He stated he will have someone here as soon as he can.     1819- Called [facility], spoke to Bala, inquired on the ETA for this pt's p/u, he stated he can not give me an ETA at this time. He stated his units have been tied up with emergencies.     1824- Spoke to Kimberly at [facility]'s w/c van transport, she stated she can be here in 30 mins.    1825- Called [facility] back, spoke to Delores, advised her to cx the call.     1827- Up-date given to charge nurse Aidan and Torres.

## 2017-10-28 NOTE — PROGRESS NOTES
Call received from pt's wife stating she has to go to the hospital due her high blood pressure and nose bleeding. Mrs. Dey request charge nurse to keep Mr. Dey at the hospital , because no one is at the house to receive him. Charge nurse informed staff nurse of call.

## 2017-10-28 NOTE — PROGRESS NOTES
Call placed to EEG tech pager # [phone] to inform of need for EEG prior to discharge, awaiting return call.

## 2017-10-28 NOTE — ASSESSMENT & PLAN NOTE
- Intermittent episodes of MCI; consistent with past admissions  - Typically early in the morning or late evening, consistent with sundowning; suspect underlying dementia vs age related changes  - CT head shows no acute process  - No s/s of infectious or metabolic etiology. Afebrile without leukocytosis. CXR and UA unremarkable. Electrolytes WNL  - EEG shows mild generalized slowing of overall waking; no evidence of seizures captured and no evidence of focal cerebral dysfunction  - Discussed with neurology; agreed no further workup as inpatient warranted. Neurology to schedule outpatient follow up

## 2017-10-28 NOTE — DISCHARGE SUMMARY
"[facility] Hospital Medicine  Discharge Summary      Patient Name: Tona Dey  MRN: [id number]  Admission Date: 10/27/2017  Hospital Length of Stay: 0 days  Discharge Date and Time: No discharge date for patient encounter.  Attending Physician: Giovanni Delgado MD   Discharging Provider: Elida Denton PA-C  Primary Care Provider: [facility] Medicine Team: [facility]OSP MED F Elida Denton PA-C    HPI:   86 year old male with a PMHx of anemia, CKD, Parkinson's, and orthostatic hypotension presenting to the ED by EMS with wife at bedside after reported episode of LOC at home. Wife reports home health nurse was present during episode. Per HH nurse,  patient was sitting on the toilet having a BM when the patient "went back and stiff." HH nurse denies abnormal tremors (tremors at baseline 2/2 Parkinson's) and jerking movements. Episode lasted ~40 seconds. Pt did not fall or experience trauma during episode. Per wife,  nurse called for her after moving patient back into bed; SBP ~ "80s." Patient states that he recalls the episode and had weakness and fatigue at the time. Wife states "it was probably his blood pressure." At the time of my exam, pt endorses myalgias (chronic L hip and knee pain ). Pt has been working with  PT. Pt denies chest pain, SOB, abdominal pain, N/V/D, fever, chills, weakness, and urinary complaints.      In the ED, VSS. Afebrile without leukocytosis. Labs remarkable for Hgb 11.6 (at baseline) and phos 2.6. Trop WNL. . EKG shows NSR (81). CT head without acute findings. CXR shows stable findings. UA unremarkable.    * No surgery found *      Hospital Course:   Pt admitted to observation after episode of LOC at home while having BM; suspected to be vasovagal in setting of known orthostatic hypotension. CT head without acute findings. No s/s of infectious etiology. CXR shows stable findings. UA unremarkable. EKG shows NSR (81). Trop WNL. Obtained EEG " although clinic suspicion for seizure is very low; EEG shows mild generalized slowing of overall waking; no evidence of seizures captured and no evidence of focal cerebral dysfunction. Pt discharged home to follow up with neurology as an outpatient.        * LOC (loss of consciousness)    Likely vagal in setting of known orthostatic hypotension  - Episode of LOC during BM on toilet  - SBP in 80s s/p episode   - Afebrile without leukocytosis  - CXR shows stable findings. UA unremarkable  - EKG shows NSR (81). Trop WNL  - CT head without acute findings  - 2d CFD from 10/2/2017 shows pEF with normal diastolic function; will not repeat  - Will get EEG although clinic suspicion for seizure is very low  10/28: EEG shows mild generalized slowing of overall waking; no evidence of seizures captured and no evidence of focal cerebral dysfunction. Pt discharged home to follow up with neurology as an outpatient.         Long-term (current) use of anticoagulants    - INR 2.0 on admit  - Follows with coumadin clinic          Acute deep vein thrombosis (DVT) of popliteal vein of left lower extremity    - Continue home coumadin 5 mg daily  - Follows with coumadin clinic          CKD (chronic kidney disease) stage 2, GFR 60-89 ml/min    - Cr 1.1 on admit  - Avoid nephrotoxic agents  - Will continue to monitor          Mild cognitive impairment    - Intermittent episodes of MCI; consistent with past admissions  - Typically early in the morning or late evening, consistent with sundowning; suspect underlying dementia vs age related changes  - CT head shows no acute process  - No s/s of infectious or metabolic etiology. Afebrile without leukocytosis. CXR and UA unremarkable. Electrolytes WNL  - EEG shows mild generalized slowing of overall waking; no evidence of seizures captured and no evidence of focal cerebral dysfunction  - Discussed with neurology; agreed no further workup as inpatient warranted. Neurology to schedule outpatient  follow up          Parkinsonism    - Continue home Sinemet  - Outpatient f/u with neurology          Autonomic instability    - Midodrine for SBP <140            Final Active Diagnoses:    Diagnosis Date Noted POA    PRINCIPAL PROBLEM:  LOC (loss of consciousness) [R40.20] 10/27/2017 Yes    Long-term (current) use of anticoagulants [Z79.01] 10/11/2017 Not Applicable    Acute deep vein thrombosis (DVT) of popliteal vein of left lower extremity [I82.432] 10/05/2017 Yes    CKD (chronic kidney disease) stage 2, GFR 60-89 ml/min [N18.2] 07/22/2016 Yes     Chronic    Mild cognitive impairment [G31.84] 10/28/2017 Yes    Parkinsonism [G20] 10/07/2017 Yes    Autonomic instability [G90.9] 09/01/2017 Yes     Chronic      Problems Resolved During this Admission:    Diagnosis Date Noted Date Resolved POA       Discharged Condition: stable    Disposition: Home or Self Care    Follow Up:    Patient Instructions:     Diet general     Activity as tolerated     Call MD for:  temperature >100.4     Call MD for:  redness, tenderness, or signs of infection (pain, swelling, redness, odor or green/yellow discharge around incision site)         Significant Diagnostic Studies: Radiology: X-Ray: CXR: X-Ray Chest 1 View (CXR):   Results for orders placed or performed during the hospital encounter of 10/27/17   X-Ray Chest 1 View    Narrative    Comparison is made to 10/14/17.     The heart size is normal, as is the appearance of the pulmonary vascularity.  Cardiomediastinal silhouette demonstrates no detrimental change since the prior exam referenced above, with note again made of some tortuosity of the thoracic aorta.  Lung zones are clear, and free of significant airspace consolidation or volume loss.  No pleural fluid.  No pneumothorax.  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    Impression     No  significant intrathoracic abnormality.  Allowing for a poorer inspiratory depth on the current examination, no significant detrimental interval change in the appearance of the chest since 10/14/17 is appreciated.      Electronically signed by: Abundio Berrios MD  Date:     10/27/17  Time:    11:49      CT scan: Head  Cardiac Graphics: ECG  EEG    Pending Diagnostic Studies:     None         Medications:  Reconciled Home Medications:   Current Discharge Medication List      CONTINUE these medications which have NOT CHANGED    Details   carbidopa-levodopa  mg (SINEMET)  mg per tablet Take 1 tablet by mouth 3 (three) times daily.  Qty: 90 tablet, Refills: 0      midodrine (PROAMATINE) 10 MG tablet Take 0.5 tablets (5 mg total) by mouth 3 (three) times daily.  Qty: 90 tablet, Refills: 0             warfarin (COUMADIN) 5 MG tablet Take 1 tablet (5 mg total) by mouth Daily.  Qty: 30 tablet, Refills: 0      acetaminophen (TYLENOL) 325 MG tablet Take 2 tablets (650 mg total) by mouth every 4 (four) hours as needed for Pain.  Qty: 30 tablet, Refills: 0      comp.stocking,knee,long,medium Misc 1 application by Misc.(Non-Drug; Combo Route) route daily as needed (Leg swelling).  Qty: 10 application, Refills: 0      lidocaine (LIDODERM) 5 % Place 1 patch onto the skin once daily. Remove & Discard patch within 12 hours or as directed by MD  Qty: 30 patch, Refills: 0             Indwelling Lines/Drains at time of discharge:   Lines/Drains/Airways          No matching active lines, drains, or airways          Time spent on the discharge of patient: 30 minutes  Patient was seen and examined on the date of discharge and determined to be suitable for discharge.         Elida Denton PA-C  Department of Hospital Medicine  [facility]

## 2017-10-28 NOTE — PROCEDURES
DATE OF STUDY:  10/28/2017    DURATION:  26 minutes and 29 seconds.    ELECTROENCEPHALOGRAM REPORT    METHODOLOGY:  Electroencephalographic (EEG) recording is recorded with   electrodes placed according to the International 10-20 placement system.  Thirty   two (32) channels of digital signal (sampling rate of 512/sec), including T1   and T2, were simultaneously recorded from the scalp and may include EKG, EMG,   and/or eye monitors.  Recording band pass was 0.1 to 512 Hz.  Digital video   recording of the patient is simultaneously recorded with the EEG.  The patient   is instructed to report clinical symptoms which may occur during the recording   session.  EEG and video recording are stored and archived in digital format.    Activation procedures, which include photic stimulation, hyperventilation and   instructing patients to perform simple tasks, are done in selected patients.    The EEG is displayed on a monitor screen and can be reviewed using different   montages.  Computer assisted-analysis is employed to detect spike and   electrographic seizure activity.  The entire record is submitted for computer   analysis.  The entire recording is visually reviewed, and the times identified   by computer analysis as being spikes or seizures are reviewed again.    Compressed spectral analysis (CSA) is also performed on the activity recorded   from each individual channel.  This is displayed as a power display of   frequencies from 0 to 30 Hz over time.  The CSA is reviewed looking for   asymmetries in power between homologous areas of the scalp, then compared with   the original EEG recording.    [employer] software was also utilized in the review of this study.  This software   suite analyzes the EEG recording in multiple domains.  Coherence and rhythmicity   are computed to identify EEG sections which may contain organized seizures.    Each channel undergoes analysis to detect the presence of spike and sharp waves    which have special and morphological characteristics of epileptic activity.  The   routine EEG recording is converted from special into frequency domain.  This is   then displayed comparing homologous areas to identify areas of significant   asymmetry.  Algorithm to identify non-cortically generated artifact is used to   separate artifact from the EEG.    EEG FINDINGS:  The background of this record contains low to medium amplitude   activity in the theta range primarily with some admixed alpha frequencies as   well.  The theta activity is in the 6 Hz to 8 Hz range with occasional admixed   generalized rhythmic delta activity and frontal rhythmic delta activity seen as   well at 2 Hz.  There are scattered faster frequency activities in both the alpha   and beta range at times, but a well-defined posterior dominant rhythm is not   really appreciated.  The patient is awake.  In the accompanying video, there are   some movements.  There is appropriate EMG artifact and eye blink artifact.  The   patient does respond to the technologist, but answers some questions correctly   and some incorrectly.    Sleep was not seen.  There were no epileptiform discharges.  There were no   electrographic seizures.  There were no focal findings.  There were no marked   push-button events.    INTERPRETATION:  Abnormal EEG due to mild generalized slowing of overall waking   background rhythms in this 86-year-old hospitalized patient.  There was no   evidence of seizures captured and no evidence of focal cerebral dysfunction.      NBB/IN  dd: 10/28/2017 13:45:07 (CDT)  td: 10/28/2017 14:53:39 (CDT)  Doc ID   #[id number]  Job ID #[id number]    CC:

## 2017-10-28 NOTE — PLAN OF CARE
Problem: Patient Care Overview  Goal: Plan of Care Review  Outcome: Ongoing (interventions implemented as appropriate)  Pt remained free from injury/falls this shift. VSS, afebrile. PRN medication administered for back pain. Pt reoriented multiple times throughout shift d/t confusion. Skin remains intact, no new breakdown noted. Bed locked in lowest position, SR upx2. Call light within reach. Will continue to monitor.

## 2017-10-28 NOTE — ASSESSMENT & PLAN NOTE
Likely vagal in setting of known orthostatic hypotension  - Episode of LOC during BM on toilet  - SBP in 80s s/p episode   - Afebrile without leukocytosis  - CXR shows stable findings. UA unremarkable  - EKG shows NSR (81). Trop WNL  - CT head without acute findings  - 2d CFD from 10/2/2017 shows pEF with normal diastolic function; will not repeat  - Will get EEG although clinic suspicion for seizure is very low  10/28: EEG shows mild generalized slowing of overall waking; no evidence of seizures captured and no evidence of focal cerebral dysfunction. Pt discharged home to follow up with neurology as an outpatient.

## 2017-10-28 NOTE — PLAN OF CARE
CM asked by ANDI Denton to arrange W/C van tx for this pt to go home as per his wife's request. Elida stated she just spoke with the pt's wife and she is aware he is discharged and will be coming home this afternoon.       1515- Called pt's wife Alem ([phone]), phone rang 3X then sent to . Left her a  MSG advising her that w/c van has been arranged and I needed to confirm she will be home to recv him. Left her my name and contact number for her to return my call.     1524- Spoke to pt's wife Alem, confirmed address on the face sheet is correct, and she is home and ready to recv the pt.     1530- Pt's nurse Ayush made aware of d/c and [facility] will be here within the hour. D/C packet placed at nurses station.

## 2017-10-28 NOTE — PROGRESS NOTES
Pt /76, HR 77. BP steadily increasing since receiving midodrine @2100. On-call for ANDREA, ANDI Arnett notified. Pt asymptomatic at this time, all other VSS. Will continue to monitor.    Reassessed BP with pt sitting upright. 146/77, HR 80. Will continue to monitor.

## 2017-10-28 NOTE — HOSPITAL COURSE
Pt admitted to observation after episode of LOC at home while having BM; suspected to be vasovagal in setting of known orthostatic hypotension. CT head without acute findings. No s/s of infectious etiology. CXR shows stable findings. UA unremarkable. EKG shows NSR (81). Trop WNL. Obtained EEG although clinic suspicion for seizure is very low; EEG shows mild generalized slowing of overall waking; no evidence of seizures captured and no evidence of focal cerebral dysfunction. Pt discharged home to follow up with neurology as an outpatient. Delay in discharge 2/2 wife (hair appointments, doctor appointments, requesting to keep patient in hospital for the weekend). Discussed and offered assistance in long term placement for patient; pt and wife refuse. Of note, patient left [facility] AMA; patient and wife adamant about continuing care at home.

## 2017-10-28 NOTE — PROGRESS NOTES
Patient's wife called and said she will not be able to be home to receive patient when he is dropped off by transport as she has a nose bleed and is going to the emergency room.  Called and spoke with ANDI Marrero and notified her of what wife had said.  She verbalized understanding.

## 2017-10-29 VITALS
DIASTOLIC BLOOD PRESSURE: 107 MMHG | WEIGHT: 213 LBS | SYSTOLIC BLOOD PRESSURE: 177 MMHG | RESPIRATION RATE: 18 BRPM | BODY MASS INDEX: 32.28 KG/M2 | HEART RATE: 81 BPM | TEMPERATURE: 98 F | OXYGEN SATURATION: 99 % | HEIGHT: 68 IN

## 2017-10-29 LAB
ALBUMIN SERPL BCP-MCNC: 3.1 G/DL
ALP SERPL-CCNC: 55 U/L
ALT SERPL W/O P-5'-P-CCNC: 7 U/L
ANION GAP SERPL CALC-SCNC: 7 MMOL/L
AST SERPL-CCNC: 15 U/L
BASOPHILS # BLD AUTO: 0.03 K/UL
BASOPHILS NFR BLD: 0.5 %
BILIRUB SERPL-MCNC: 1.3 MG/DL
BUN SERPL-MCNC: 12 MG/DL
CALCIUM SERPL-MCNC: 9.3 MG/DL
CHLORIDE SERPL-SCNC: 108 MMOL/L
CO2 SERPL-SCNC: 23 MMOL/L
CREAT SERPL-MCNC: 0.9 MG/DL
DIFFERENTIAL METHOD: ABNORMAL
EOSINOPHIL # BLD AUTO: 0.1 K/UL
EOSINOPHIL NFR BLD: 2.1 %
ERYTHROCYTE [DISTWIDTH] IN BLOOD BY AUTOMATED COUNT: 12.3 %
EST. GFR  (AFRICAN AMERICAN): >60 ML/MIN/1.73 M^2
EST. GFR  (NON AFRICAN AMERICAN): >60 ML/MIN/1.73 M^2
GLUCOSE SERPL-MCNC: 87 MG/DL
HCT VFR BLD AUTO: 35.4 %
HGB BLD-MCNC: 11.2 G/DL
IMM GRANULOCYTES # BLD AUTO: 0.03 K/UL
IMM GRANULOCYTES NFR BLD AUTO: 0.5 %
INR PPP: 2
LYMPHOCYTES # BLD AUTO: 2.5 K/UL
LYMPHOCYTES NFR BLD: 41 %
MAGNESIUM SERPL-MCNC: 1.8 MG/DL
MCH RBC QN AUTO: 30.9 PG
MCHC RBC AUTO-ENTMCNC: 31.6 G/DL
MCV RBC AUTO: 98 FL
MONOCYTES # BLD AUTO: 0.4 K/UL
MONOCYTES NFR BLD: 6.3 %
NEUTROPHILS # BLD AUTO: 3.1 K/UL
NEUTROPHILS NFR BLD: 49.6 %
NRBC BLD-RTO: 0 /100 WBC
PHOSPHATE SERPL-MCNC: 3 MG/DL
PLATELET # BLD AUTO: 192 K/UL
PMV BLD AUTO: 9.8 FL
POTASSIUM SERPL-SCNC: 4.2 MMOL/L
PROT SERPL-MCNC: 6.6 G/DL
PROTHROMBIN TIME: 19.6 SEC
RBC # BLD AUTO: 3.63 M/UL
SODIUM SERPL-SCNC: 138 MMOL/L
WBC # BLD AUTO: 6.15 K/UL

## 2017-10-29 PROCEDURE — 80053 COMPREHEN METABOLIC PANEL: CPT

## 2017-10-29 PROCEDURE — 36415 COLL VENOUS BLD VENIPUNCTURE: CPT

## 2017-10-29 PROCEDURE — 85610 PROTHROMBIN TIME: CPT

## 2017-10-29 PROCEDURE — G0378 HOSPITAL OBSERVATION PER HR: HCPCS

## 2017-10-29 PROCEDURE — 84100 ASSAY OF PHOSPHORUS: CPT

## 2017-10-29 PROCEDURE — 99217 PR OBSERVATION CARE DISCHARGE: CPT | Mod: ,,, | Performed by: PHYSICIAN ASSISTANT

## 2017-10-29 PROCEDURE — 85025 COMPLETE CBC W/AUTO DIFF WBC: CPT

## 2017-10-29 PROCEDURE — 25000003 PHARM REV CODE 250: Performed by: PHYSICIAN ASSISTANT

## 2017-10-29 PROCEDURE — 83735 ASSAY OF MAGNESIUM: CPT

## 2017-10-29 RX ADMIN — CARBIDOPA AND LEVODOPA 1 TABLET: 25; 100 TABLET ORAL at 05:10

## 2017-10-29 NOTE — DISCHARGE SUMMARY
"[facility] Hospital Medicine  Discharge Summary      Patient Name: Tona Dey  MRN: [id number]  Admission Date: 10/27/2017  Hospital Length of Stay: 0 days  Discharge Date and Time: 10/29/2017  9:03 AM  Attending Physician: Kylie att. providers found   Discharging Provider: Elida Denton PA-C  Primary Care Provider: Primary Doctor Kylie  [facility] Medicine Team: [facility] MED F Elida Denton PA-C    HPI:   86 year old male with a PMHx of anemia, CKD, Parkinson's, and orthostatic hypotension presenting to the ED by EMS with wife at bedside after reported episode of LOC at home. Wife reports home health nurse was present during episode. Per HH nurse,  patient was sitting on the toilet having a BM when the patient "went back and stiff." HH nurse denies abnormal tremors (tremors at baseline 2/2 Parkinson's) and jerking movements. Episode lasted ~40 seconds. Pt did not fall or experience trauma during episode. Per wife,  nurse called for her after moving patient back into bed; SBP ~ "80s." Patient states that he recalls the episode and had weakness and fatigue at the time. Wife states "it was probably his blood pressure." At the time of my exam, pt endorses myalgias (chronic L hip and knee pain ). Pt has been working with  PT. Pt denies chest pain, SOB, abdominal pain, N/V/D, fever, chills, weakness, and urinary complaints.      In the ED, VSS. Afebrile without leukocytosis. Labs remarkable for Hgb 11.6 (at baseline) and phos 2.6. Trop WNL. . EKG shows NSR (81). CT head without acute findings. CXR shows stable findings. UA unremarkable.    * No surgery found *      Hospital Course:   Pt admitted to observation after episode of LOC at home while having BM; suspected to be vasovagal in setting of known orthostatic hypotension. CT head without acute findings. No s/s of infectious etiology. CXR shows stable findings. UA unremarkable. EKG shows NSR (81). Trop WNL. Obtained EEG although clinic " "suspicion for seizure is very low; EEG shows mild generalized slowing of overall waking; no evidence of seizures captured and no evidence of focal cerebral dysfunction. Pt discharged home to follow up with neurology as an outpatient. Delay in discharge 2/2 wife (not answering phone, unable to transport 2/2 wife has "hair and doctor appointments", wife requesting to keep patient in hospital for the weekend). Discussed and offered assistance in long term placement for patient; pt and wife refuse. Of note, patient left Located within [facility] AMA; patient and wife adamant about continuing care at home with [facility] and 24/7 RN.       * LOC (loss of consciousness)    Likely vagal in setting of known orthostatic hypotension  - Episode of LOC during BM on toilet  - SBP in 80s s/p episode   - Afebrile without leukocytosis  - CXR shows stable findings. UA unremarkable  - EKG shows NSR (81). Trop WNL  - CT head without acute findings  - 2d CFD from 10/2/2017 shows pEF with normal diastolic function; will not repeat  - Will get EEG although clinic suspicion for seizure is very low  10/28: EEG shows mild generalized slowing of overall waking; no evidence of seizures captured and no evidence of focal cerebral dysfunction. Pt discharged home to follow up with neurology as an outpatient.         Long-term (current) use of anticoagulants    - INR 2.0 on admit  - Follows with coumadin clinic          Acute deep vein thrombosis (DVT) of popliteal vein of left lower extremity    - Continue home coumadin 5 mg daily  - Follows with coumadin clinic          CKD (chronic kidney disease) stage 2, GFR 60-89 ml/min    - Cr 1.1 on admit  - Avoid nephrotoxic agents  - Will continue to monitor          Mild cognitive impairment    - Intermittent episodes of MCI; consistent with past admissions  - Typically early in the morning or late evening, consistent with sundowning; suspect underlying dementia vs age related changes  - CT head shows no acute process  - No " s/s of infectious or metabolic etiology. Afebrile without leukocytosis. CXR and UA unremarkable. Electrolytes WNL  - EEG shows mild generalized slowing of overall waking; no evidence of seizures captured and no evidence of focal cerebral dysfunction  - Discussed with neurology; agreed no further workup as inpatient warranted. Neurology to schedule outpatient follow up          Parkinsonism    - Continue home Sinemet  - Outpatient f/u with neurology          Autonomic instability    - Midodrine for SBP <140            Final Active Diagnoses:    Diagnosis Date Noted POA    PRINCIPAL PROBLEM:  LOC (loss of consciousness) [R40.20] 10/27/2017 Yes    Long-term (current) use of anticoagulants [Z79.01] 10/11/2017 Not Applicable    Acute deep vein thrombosis (DVT) of popliteal vein of left lower extremity [I82.432] 10/05/2017 Yes    CKD (chronic kidney disease) stage 2, GFR 60-89 ml/min [N18.2] 07/22/2016 Yes     Chronic    Mild cognitive impairment [G31.84] 10/28/2017 Yes    Parkinsonism [G20] 10/07/2017 Yes    Autonomic instability [G90.9] 09/01/2017 Yes     Chronic      Problems Resolved During this Admission:    Diagnosis Date Noted Date Resolved POA       Discharged Condition: stable    Disposition: Home or Self Care    Follow Up:    Patient Instructions:     Diet general     Activity as tolerated     Call MD for:  temperature >100.4     Call MD for:  redness, tenderness, or signs of infection (pain, swelling, redness, odor or green/yellow discharge around incision site)         Significant Diagnostic Studies: Radiology: X-Ray: CXR: X-Ray Chest 1 View (CXR):   Results for orders placed or performed during the hospital encounter of 10/27/17   X-Ray Chest 1 View    Narrative    Comparison is made to 10/14/17.     The heart size is normal, as is the appearance of the pulmonary vascularity.  Cardiomediastinal silhouette demonstrates no detrimental change since the prior exam referenced above, with note again made of  some tortuosity of the thoracic aorta.  Lung zones are clear, and free of significant airspace consolidation or volume loss.  No pleural fluid.  No pneumothorax.  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    Impression     No significant intrathoracic abnormality.  Allowing for a poorer inspiratory depth on the current examination, no significant detrimental interval change in the appearance of the chest since 10/14/17 is appreciated.      Electronically signed by: Abundio Berrios MD  Date:     10/27/17  Time:    11:49      CT scan: Head  Cardiac Graphics: ECG  EEG    Pending Diagnostic Studies:     None         Medications:  Reconciled Home Medications:   Discharge Medication List as of 10/28/2017  3:52 PM      CONTINUE these medications which have NOT CHANGED    Details   midodrine (PROAMATINE) 10 MG tablet Take 0.5 tablets (5 mg total) by mouth 3 (three) times daily., Starting Fri 10/6/2017, Until Sat 10/6/2018, Print      acetaminophen (TYLENOL) 325 MG tablet Take 2 tablets (650 mg total) by mouth every 4 (four) hours as needed for Pain., Starting Sat 10/14/2017, Print      carbidopa-levodopa  mg (SINEMET)  mg per tablet Take 1 tablet by mouth 3 (three) times daily., Starting Sat 10/14/2017, Until Mon 11/13/2017, Print      comp.stocking,knee,long,medium Misc 1 application by Misc.(Non-Drug; Combo Route) route daily as needed (Leg swelling)., Starting Sat 10/14/2017, Until Mon 11/13/2017, Print      lidocaine (LIDODERM) 5 % Place 1 patch onto the skin once daily. Remove & Discard patch within 12 hours or as directed by MD, Starting Sat 10/14/2017, Print             warfarin (COUMADIN) 5 MG tablet Take 1 tablet (5 mg total) by mouth Daily., Starting Sat 10/14/2017, Until Sun 10/14/2018, Print             Indwelling Lines/Drains at time of discharge:   Lines/Drains/Airways           No matching active lines, drains, or airways          Time spent on the discharge of patient: 30 minutes  Patient was seen and examined on the date of discharge and determined to be suitable for discharge.         Elida Denton PA-C  Department of Hospital Medicine  [facility]

## 2017-10-29 NOTE — PLAN OF CARE
Problem: Patient Care Overview  Goal: Plan of Care Review  Outcome: Ongoing (interventions implemented as appropriate)  Pt remained free from injury/falls this shift. VSS, afebrile. No c/o pain. Skin intact, repositioned independently. Bed locked in lowest position, SR uox2. Call light within reach. Will continue to monitor.

## 2017-10-29 NOTE — PLAN OF CARE
Contacted pt's wife, confirmed she is home this morning and will be to accept the pt. Re-confirmed pt will be going home to   [address] [place] [address] Phone  [phone] (Home)  [phone] (Mobile) *Preferred*     Paged ANDI Marrero to confirm pt can leave this morning as he has been discharged yesterday and his wife has been difficult in allowing us to send him home.     0730- Spoke to ANDI Denton, she stated this pt is still ready to d/c.    0735- Contacted [facility], spoke to Kimberly, advised pt is ready to discharge, she stated she will send someone over now.     0737- Advised pt's nurse [facility] is on their way.     0835- Spoke to Kimberly with [facility], she stated she is pulling up here now.

## 2017-10-31 NOTE — PROGRESS NOTES
Per [facility], the pt was discharged home.  I tried to reach him and LMFCB x 2.  We will continue to try to reach him.

## 2017-11-04 ENCOUNTER — HOSPITAL ENCOUNTER (INPATIENT)
Facility: HOSPITAL | Age: 82
LOS: 3 days | Discharge: HOME HEALTH | DRG: 312 | End: 2017-11-07
Attending: EMERGENCY MEDICINE | Admitting: HOSPITALIST
Payer: MEDICARE

## 2017-11-04 DIAGNOSIS — R60.0 LOWER EXTREMITY EDEMA: ICD-10-CM

## 2017-11-04 DIAGNOSIS — I82.432 ACUTE DEEP VEIN THROMBOSIS (DVT) OF POPLITEAL VEIN OF LEFT LOWER EXTREMITY: ICD-10-CM

## 2017-11-04 DIAGNOSIS — R55 SYNCOPE: ICD-10-CM

## 2017-11-04 DIAGNOSIS — N39.0 URINARY TRACT INFECTION WITHOUT HEMATURIA, SITE UNSPECIFIED: Primary | ICD-10-CM

## 2017-11-04 DIAGNOSIS — G90.9 AUTONOMIC INSTABILITY: Chronic | ICD-10-CM

## 2017-11-04 PROBLEM — R82.71 BACTERIURIA, ASYMPTOMATIC: Status: ACTIVE | Noted: 2017-11-04

## 2017-11-04 LAB
ALBUMIN SERPL BCP-MCNC: 3.1 G/DL
ALP SERPL-CCNC: 57 U/L
ALT SERPL W/O P-5'-P-CCNC: 13 U/L
ANION GAP SERPL CALC-SCNC: 10 MMOL/L
AST SERPL-CCNC: 18 U/L
BACTERIA #/AREA URNS AUTO: ABNORMAL /HPF
BASOPHILS # BLD AUTO: 0.03 K/UL
BASOPHILS NFR BLD: 0.6 %
BILIRUB SERPL-MCNC: 1.1 MG/DL
BILIRUB UR QL STRIP: NEGATIVE
BNP SERPL-MCNC: 31 PG/ML
BUN SERPL-MCNC: 14 MG/DL
CALCIUM SERPL-MCNC: 9.5 MG/DL
CHLORIDE SERPL-SCNC: 108 MMOL/L
CLARITY UR REFRACT.AUTO: ABNORMAL
CO2 SERPL-SCNC: 25 MMOL/L
COLOR UR AUTO: YELLOW
CREAT SERPL-MCNC: 1 MG/DL
DIFFERENTIAL METHOD: ABNORMAL
EOSINOPHIL # BLD AUTO: 0.1 K/UL
EOSINOPHIL NFR BLD: 2.3 %
ERYTHROCYTE [DISTWIDTH] IN BLOOD BY AUTOMATED COUNT: 12.5 %
EST. GFR  (AFRICAN AMERICAN): >60 ML/MIN/1.73 M^2
EST. GFR  (NON AFRICAN AMERICAN): >60 ML/MIN/1.73 M^2
GLUCOSE SERPL-MCNC: 94 MG/DL
GLUCOSE UR QL STRIP: NEGATIVE
GRAM STN SPEC: NORMAL
HCT VFR BLD AUTO: 36.3 %
HGB BLD-MCNC: 11.6 G/DL
HGB UR QL STRIP: ABNORMAL
HYALINE CASTS UR QL AUTO: 0 /LPF
IMM GRANULOCYTES # BLD AUTO: 0.02 K/UL
IMM GRANULOCYTES NFR BLD AUTO: 0.4 %
INR PPP: 1.4
KETONES UR QL STRIP: NEGATIVE
LACTATE SERPL-SCNC: 1.3 MMOL/L
LACTATE SERPL-SCNC: 2.6 MMOL/L
LEUKOCYTE ESTERASE UR QL STRIP: ABNORMAL
LYMPHOCYTES # BLD AUTO: 1.3 K/UL
LYMPHOCYTES NFR BLD: 27 %
MAGNESIUM SERPL-MCNC: 1.9 MG/DL
MCH RBC QN AUTO: 31.2 PG
MCHC RBC AUTO-ENTMCNC: 32 G/DL
MCV RBC AUTO: 98 FL
MICROSCOPIC COMMENT: ABNORMAL
MONOCYTES # BLD AUTO: 0.4 K/UL
MONOCYTES NFR BLD: 7.2 %
NEUTROPHILS # BLD AUTO: 3.1 K/UL
NEUTROPHILS NFR BLD: 62.5 %
NITRITE UR QL STRIP: NEGATIVE
NRBC BLD-RTO: 0 /100 WBC
PH UR STRIP: 6 [PH] (ref 5–8)
PHOSPHATE SERPL-MCNC: 3.3 MG/DL
PLATELET # BLD AUTO: 193 K/UL
PMV BLD AUTO: 10.2 FL
POTASSIUM SERPL-SCNC: 4.9 MMOL/L
PROT SERPL-MCNC: 7.2 G/DL
PROT UR QL STRIP: ABNORMAL
PROTHROMBIN TIME: 14.7 SEC
RBC # BLD AUTO: 3.72 M/UL
RBC #/AREA URNS AUTO: 1 /HPF (ref 0–4)
SODIUM SERPL-SCNC: 143 MMOL/L
SP GR UR STRIP: 1.01 (ref 1–1.03)
SQUAMOUS #/AREA URNS AUTO: 1 /HPF
TROPONIN I SERPL DL<=0.01 NG/ML-MCNC: <0.006 NG/ML
URN SPEC COLLECT METH UR: ABNORMAL
UROBILINOGEN UR STRIP-ACNC: 2 EU/DL
WBC # BLD AUTO: 4.88 K/UL
WBC #/AREA URNS AUTO: 22 /HPF (ref 0–5)

## 2017-11-04 PROCEDURE — 87086 URINE CULTURE/COLONY COUNT: CPT

## 2017-11-04 PROCEDURE — 85610 PROTHROMBIN TIME: CPT

## 2017-11-04 PROCEDURE — 63600175 PHARM REV CODE 636 W HCPCS: Performed by: PAIN MEDICINE

## 2017-11-04 PROCEDURE — 25000003 PHARM REV CODE 250: Performed by: STUDENT IN AN ORGANIZED HEALTH CARE EDUCATION/TRAINING PROGRAM

## 2017-11-04 PROCEDURE — 84100 ASSAY OF PHOSPHORUS: CPT

## 2017-11-04 PROCEDURE — A4216 STERILE WATER/SALINE, 10 ML: HCPCS | Performed by: STUDENT IN AN ORGANIZED HEALTH CARE EDUCATION/TRAINING PROGRAM

## 2017-11-04 PROCEDURE — 99222 1ST HOSP IP/OBS MODERATE 55: CPT | Mod: AI,GC,, | Performed by: HOSPITALIST

## 2017-11-04 PROCEDURE — 96365 THER/PROPH/DIAG IV INF INIT: CPT

## 2017-11-04 PROCEDURE — 83880 ASSAY OF NATRIURETIC PEPTIDE: CPT

## 2017-11-04 PROCEDURE — 25000003 PHARM REV CODE 250: Performed by: INTERNAL MEDICINE

## 2017-11-04 PROCEDURE — 81001 URINALYSIS AUTO W/SCOPE: CPT

## 2017-11-04 PROCEDURE — 83605 ASSAY OF LACTIC ACID: CPT | Mod: 91

## 2017-11-04 PROCEDURE — 36415 COLL VENOUS BLD VENIPUNCTURE: CPT

## 2017-11-04 PROCEDURE — 11000001 HC ACUTE MED/SURG PRIVATE ROOM

## 2017-11-04 PROCEDURE — 83605 ASSAY OF LACTIC ACID: CPT

## 2017-11-04 PROCEDURE — 83735 ASSAY OF MAGNESIUM: CPT

## 2017-11-04 PROCEDURE — 80053 COMPREHEN METABOLIC PANEL: CPT

## 2017-11-04 PROCEDURE — 84484 ASSAY OF TROPONIN QUANT: CPT

## 2017-11-04 PROCEDURE — 85025 COMPLETE CBC W/AUTO DIFF WBC: CPT

## 2017-11-04 PROCEDURE — 87205 SMEAR GRAM STAIN: CPT

## 2017-11-04 PROCEDURE — 99285 EMERGENCY DEPT VISIT HI MDM: CPT

## 2017-11-04 PROCEDURE — 99284 EMERGENCY DEPT VISIT MOD MDM: CPT | Mod: ,,, | Performed by: EMERGENCY MEDICINE

## 2017-11-04 RX ORDER — SULFAMETHOXAZOLE AND TRIMETHOPRIM 800; 160 MG/1; MG/1
2 TABLET ORAL 2 TIMES DAILY
Status: DISCONTINUED | OUTPATIENT
Start: 2017-11-05 | End: 2017-11-05

## 2017-11-04 RX ORDER — CARBIDOPA AND LEVODOPA 25; 100 MG/1; MG/1
1 TABLET ORAL 3 TIMES DAILY
Status: DISCONTINUED | OUTPATIENT
Start: 2017-11-04 | End: 2017-11-07 | Stop reason: HOSPADM

## 2017-11-04 RX ORDER — WARFARIN SODIUM 5 MG/1
5 TABLET ORAL DAILY
Status: DISCONTINUED | OUTPATIENT
Start: 2017-11-04 | End: 2017-11-06

## 2017-11-04 RX ORDER — SODIUM CHLORIDE 0.9 % (FLUSH) 0.9 %
3 SYRINGE (ML) INJECTION EVERY 8 HOURS
Status: DISCONTINUED | OUTPATIENT
Start: 2017-11-04 | End: 2017-11-07 | Stop reason: HOSPADM

## 2017-11-04 RX ORDER — FLUDROCORTISONE ACETATE 0.1 MG/1
100 TABLET ORAL DAILY
Status: DISCONTINUED | OUTPATIENT
Start: 2017-11-04 | End: 2017-11-07 | Stop reason: HOSPADM

## 2017-11-04 RX ORDER — ONDANSETRON 8 MG/1
8 TABLET, ORALLY DISINTEGRATING ORAL EVERY 8 HOURS PRN
Status: DISCONTINUED | OUTPATIENT
Start: 2017-11-04 | End: 2017-11-07 | Stop reason: HOSPADM

## 2017-11-04 RX ORDER — ONDANSETRON 2 MG/ML
4 INJECTION INTRAMUSCULAR; INTRAVENOUS EVERY 8 HOURS PRN
Status: DISCONTINUED | OUTPATIENT
Start: 2017-11-04 | End: 2017-11-07 | Stop reason: HOSPADM

## 2017-11-04 RX ORDER — ACETAMINOPHEN 325 MG/1
650 TABLET ORAL EVERY 6 HOURS PRN
Status: DISCONTINUED | OUTPATIENT
Start: 2017-11-04 | End: 2017-11-07 | Stop reason: HOSPADM

## 2017-11-04 RX ORDER — MIDODRINE HYDROCHLORIDE 5 MG/1
5 TABLET ORAL 3 TIMES DAILY
Status: DISCONTINUED | OUTPATIENT
Start: 2017-11-04 | End: 2017-11-07 | Stop reason: HOSPADM

## 2017-11-04 RX ORDER — POLYETHYLENE GLYCOL 3350 17 G/17G
17 POWDER, FOR SOLUTION ORAL 2 TIMES DAILY
Status: DISCONTINUED | OUTPATIENT
Start: 2017-11-04 | End: 2017-11-07 | Stop reason: HOSPADM

## 2017-11-04 RX ORDER — MIDODRINE HYDROCHLORIDE 5 MG/1
5 TABLET ORAL 3 TIMES DAILY
Status: DISCONTINUED | OUTPATIENT
Start: 2017-11-04 | End: 2017-11-04

## 2017-11-04 RX ADMIN — Medication 3 ML: at 02:11

## 2017-11-04 RX ADMIN — POLYETHYLENE GLYCOL 3350 17 G: 17 POWDER, FOR SOLUTION ORAL at 10:11

## 2017-11-04 RX ADMIN — Medication 3 ML: at 10:11

## 2017-11-04 RX ADMIN — CARBIDOPA AND LEVODOPA 1 TABLET: 25; 100 TABLET ORAL at 02:11

## 2017-11-04 RX ADMIN — FLUDROCORTISONE ACETATE 100 MCG: 0.1 TABLET ORAL at 02:11

## 2017-11-04 RX ADMIN — WARFARIN SODIUM 5 MG: 5 TABLET ORAL at 04:11

## 2017-11-04 RX ADMIN — CEFTRIAXONE 1 G: 1 INJECTION, SOLUTION INTRAVENOUS at 10:11

## 2017-11-04 RX ADMIN — POLYETHYLENE GLYCOL 3350 17 G: 17 POWDER, FOR SOLUTION ORAL at 11:11

## 2017-11-04 RX ADMIN — MIDODRINE HYDROCHLORIDE 5 MG: 5 TABLET ORAL at 10:11

## 2017-11-04 RX ADMIN — SODIUM CHLORIDE 1000 ML: 0.9 INJECTION, SOLUTION INTRAVENOUS at 02:11

## 2017-11-04 RX ADMIN — CARBIDOPA AND LEVODOPA 1 TABLET: 25; 100 TABLET ORAL at 10:11

## 2017-11-04 NOTE — ED NOTES
Report received care assumed pt in nad crm, nipb and sp02 in place, vss pt aaox4, awaiting rm assignment

## 2017-11-04 NOTE — ED NOTES
Patient requested that his wife, Mercedes, be called to notify her of his admission and to Room 1102. Left voice mail on cell phone listed and no answer on home phone on demographic sheet.  Patient notified.

## 2017-11-04 NOTE — ASSESSMENT & PLAN NOTE
86 y.o. M with GPR, GPC in UA, patient not c/o dysuria  -Ucx IP  -Received 1 dose rocephin in the Ed, will hold abx for now; to be restarted if patient becomes febrile or Ucx returns +

## 2017-11-04 NOTE — CARE UPDATE
Attempted to contact patient's wife, Alem ([phone], [phone]). Again, no answer.     Navi Espinoza MD  Internal Medicine PGY-1  [facility]

## 2017-11-04 NOTE — ASSESSMENT & PLAN NOTE
Likely vagal in setting of known orthostatic hypotension  -Episode of LOC during BM on toilet, similar presentation on 10/27  -Has been hypertensive this admission, holding midodrine with SBP ~ 160  -Afebrile without leukocytosis; CXR shows stable findings. UA with GPR/GPC  -EKG shows NSR. Trop WNL. CT head without acute findings last admission as well  -Recent echo from 10/2/2017 shows pEF with normal diastolic function; will not repeat  -EEG from 10/28: shows mild generalized slowing of overall waking; no evidence of seizures captured and no evidence of focal cerebral dysfunction.

## 2017-11-04 NOTE — ED PROVIDER NOTES
Encounter Date: 11/4/2017       History     Chief Complaint   Patient presents with    Loss of Consciousness     Patient attempting to toilet on commode when he passed out.  Found by wife, who woke him by speaking his name.  History of the same.     HPI   The patient is a 86-year-old male with past medical history of Parkinson's disease, cognitive impairment, hypertension, chronic kidney disease, DVT on warfarin, and history of autonomic instability with orthostatic presents with an episode of syncope per EMS.  Per EMS report, the patient was using his commode at home and experienced an episode of syncope.  It is unknown if he had any head trauma.  His wife was present when this occurred however she is not here at the hospital.  The patient was recently discharged on October 29 with a similar admission for an episode of loss  of consciousness.  He had an extensive workup that did not show any significant findings.  He was discharged with plan for neurology follow-up. For his autonomic instability he was discharged with midodrine.  The patient is currently alert and oriented to the fact that he is in the hospital, his name, and date.  He is unaware of why he is currently in the emergency room.  On evaluation the patient's review of systems is negative.    Review of patient's allergies indicates:  No Known Allergies  Past Medical History:   Diagnosis Date    Anemia     Chronic back pain     Hypertension     Lumbago     Orthostatic hypotension dysautonomic syndrome     Parkinson disease     Renal disorder      Past Surgical History:   Procedure Laterality Date    BACK SURGERY  1961    ROTATOR CUFF REPAIR       Family History   Problem Relation Age of Onset    No Known Problems Mother     Cancer Father      Social History   Substance Use Topics    Smoking status: Former Smoker     Quit date: 8/25/1985    Smokeless tobacco: Never Used    Alcohol use 1.2 oz/week     2 Shots of liquor per week     Review of  Systems   Reason unable to perform ROS: ROS somewhat limited 2/2 cognitive dysfunction    Constitutional: Negative.    HENT: Negative.    Eyes: Negative.    Respiratory: Negative.    Cardiovascular: Negative.    Gastrointestinal: Negative.    Endocrine: Negative.    Genitourinary: Negative.    Musculoskeletal: Negative.    Skin: Negative.    Allergic/Immunologic: Negative.    Neurological: Negative.    Hematological: Negative.    Psychiatric/Behavioral: Negative.        Physical Exam     Initial Vitals [11/04/17 0758]   BP Pulse Resp Temp SpO2   121/68 94 18 97.5 °F (36.4 °C) 98 %      MAP       85.67         Physical Exam    Constitutional: He appears well-developed and well-nourished. He is not diaphoretic. No distress.   HENT:   Head: Normocephalic and atraumatic.   Mouth/Throat: Oropharynx is clear and moist. No oropharyngeal exudate.   Eyes: EOM are normal. Pupils are equal, round, and reactive to light. No scleral icterus.   Neck: Normal range of motion. No thyromegaly present. No tracheal deviation present. No JVD present.   Cardiovascular: Normal rate, regular rhythm, normal heart sounds and intact distal pulses. Exam reveals no gallop and no friction rub.    No murmur heard.  Pulmonary/Chest: No stridor. No respiratory distress. He has wheezes. He has no rhonchi. He has no rales. He exhibits no tenderness.   Decreased breath sounds at bilateral bases and faint expiratory wheezes on LLL    Abdominal: Soft. Bowel sounds are normal. He exhibits no distension. There is no tenderness. There is no rebound and no guarding.   Musculoskeletal: Normal range of motion. He exhibits edema. He exhibits no tenderness.   1+ LE edema below knee    Lymphadenopathy:     He has no cervical adenopathy.   Neurological: He is alert and oriented to person, place, and time. He has normal strength.   Skin: Skin is warm.   Psychiatric: He has a normal mood and affect.         ED Course   Procedures  Labs Reviewed   COMPREHENSIVE  METABOLIC PANEL - Abnormal; Notable for the following:        Result Value    Albumin 3.1 (*)     Total Bilirubin 1.1 (*)     All other components within normal limits   CBC W/ AUTO DIFFERENTIAL - Abnormal; Notable for the following:     RBC 3.72 (*)     Hemoglobin 11.6 (*)     Hematocrit 36.3 (*)     MCH 31.2 (*)     All other components within normal limits   LACTIC ACID, PLASMA - Abnormal; Notable for the following:     Lactate (Lactic Acid) 2.6 (*)     All other components within normal limits   URINALYSIS, REFLEX TO URINE CULTURE - Abnormal; Notable for the following:     Appearance, UA Cloudy (*)     Protein, UA 1+ (*)     Occult Blood UA 1+ (*)     Leukocytes, UA 2+ (*)     All other components within normal limits    Narrative:     Preferred Collection Type->Urine, Clean Catch   URINALYSIS MICROSCOPIC - Abnormal; Notable for the following:     WBC, UA 22 (*)     Bacteria, UA Many (*)     All other components within normal limits    Narrative:     Preferred Collection Type->Urine, Clean Catch   PROTIME-INR - Abnormal; Notable for the following:     Prothrombin Time 14.7 (*)     INR 1.4 (*)     All other components within normal limits    Narrative:     ADD ON PT PER MD LEDA @  11/04/2017  10:47 ORDER #[id number]   GRAM STAIN   GRAM STAIN    Narrative:     Preferred Collection Type->Urine, Clean Catch   B-TYPE NATRIURETIC PEPTIDE   TROPONIN I   PROTIME-INR   MAGNESIUM   PHOSPHORUS   PROTIME-INR   PHOSPHORUS    Narrative:     ADD ON PT PER MD LEDA @  11/04/2017  10:47 ORDER #[id number]  ADD ON PER NICK ESPARZA, ORDER #[id number]  MG, ORDER #[id number]   11:19  11/04/2017    MAGNESIUM    Narrative:     ADD ON PT PER MD LEDA @  11/04/2017  10:47 ORDER #[id number]  ADD ON PER NICK EPSARZA, ORDER #[id number]  MG, ORDER #[id number]   11:19  11/04/2017      EKG Readings: (Independently Interpreted)   Normal sinus rhythm  Inferior infarct ,age undetermined  Abnormal ECG  When compared with ECG of 27-OCT-2017  10:22,  Inferior infarct is now Present       X-Rays:   Independently Interpreted Readings:   Chest X-Ray:  Cardiomegaly.      Electronically signed by: CESAR WINCHESTER MD  Date: 11/04/17  Time: 09:49      Medical Decision Making:   Initial Assessment:   The patient is an 86-year-old male with past medical history per history of present illness who presents with a reported syncope episode while using his commode at home.  Differential Diagnosis:   Differential includes but not limited to vasovagal syncope, Orthostatic hypotension.  Less likely CVA, myocardial infarction, seizure, electrolyte derangement, iatrogenic 2/2 polypharmacy   Clinical Tests:   Lab Tests: Ordered  Radiological Study: Ordered  Medical Tests: Ordered       APC / Resident Notes:   8:31 AM  Patient discussed with Dr. Betancur.  The patient is currently alert and oriented with normal vital signs.  Plan to evaluate BNP, chest x-ray, CMP, CBC, lactic acid, troponin, urinalysis, and orthostatic vitals.  Attempted to call wife on available phone numbers however she was not answering for a more detailed history of present illness.  Last discharge summary from late October reviewed in detail.  Continue to follow up workup and monitor vital signs.  Scot Krishnamurthy MD     9:13 AM  Patient remains HD stable.  CBC within normal limits and other labs still pending.  Patient remains asymptomatic here in the emergency room.  Case discussed briefly with his wife over the phone who states he did not have any head trauma.  She states his presentation today was very similar to his last presentation when he was admitted.  Pending workup will likely have patient placed in observation.  Scot Krishnamurthy MD     9:55 AM  Urinalysis with 2+ leukocytes.  Will order for 1 g of Rocephin.  Otherwise lab workup within normal limits besides lactate of 2.6.  Orthostatic vitals positive as well.   Scot Krishnamurthy MD     10:21 AM  Pt to be admitted to Dr. Jay with internal  medicine  Scot Krishnamurthy MD               ED Course      Clinical Impression:   The primary encounter diagnosis was Urinary tract infection without hematuria, site unspecified. Diagnoses of Syncope, Autonomic instability, Lower extremity edema, and Acute deep vein thrombosis (DVT) of popliteal vein of left lower extremity were also pertinent to this visit.    Disposition:   Disposition: Admitted  Condition: Fair       Attending Note:  Physician Attestation Statement: I have personally seen and examined this patient. As the supervising MD I agree with the above history. As the supervising MD I agree with the above PE. As the supervising MD I agree with the above treatment, course, plan, and disposition.                  Yazan Betancur MD  11/12/17 0109

## 2017-11-04 NOTE — H&P
[facility] Hospital Medicine  History & Physical    Patient Name: Tona Dey  MRN: [id number]  Admission Date: 11/4/2017  Attending Physician: Yazan Betancur MD   Primary Care Provider: Primary Doctor [facility] Medicine Team: [facility]OSP MED 3 Navi Espinoza MD     Patient information was obtained from patient and ER records.     Subjective:     Principal Problem:LOC (loss of consciousness)    Chief Complaint:   Chief Complaint   Patient presents with    Loss of Consciousness     Patient attempting to toilet on commode when he passed out.  Found by wife, who woke him by speaking his name.  History of the same.        HPI: Tona Dey is a 86 y.o. M with parkinson's disease, CKD stage 2, DVT (on coumadin, follows with coumadin clinic), autonomic instability w/ orthostatis who p/t ED from home via EMS after unwitnessed syncopal episode while using bathroom earlier today. Patient wife was at home at the time though she did not accompany patient to the Ed. Patient states that he does not remember the event and does not believe that he hit his head. Patient was recently discharged from [facility] (10/27-10/29) after a similar presentation, CT negative, UA normal during that admission. Thought to be delirium from polypharmacy, received haldol in ED on DOA, was AAOx3 HD#2, was D/c to home on HD#3 with PT/OT and neuro F/u. He denies CP/SOB/N/V/F/C.  ROS positive for chronic arthritic pain in his back and knee and urinary urgency.     Past Medical History:   Diagnosis Date    Anemia     Chronic back pain     Hypertension     Lumbago     Orthostatic hypotension dysautonomic syndrome     Parkinson disease     Renal disorder        Past Surgical History:   Procedure Laterality Date    BACK SURGERY  1961    ROTATOR CUFF REPAIR         Review of patient's allergies indicates:  No Known Allergies    No current facility-administered medications on file prior to encounter.      Current Outpatient  Prescriptions on File Prior to Encounter   Medication Sig    acetaminophen (TYLENOL) 325 MG tablet Take 2 tablets (650 mg total) by mouth every 4 (four) hours as needed for Pain.    carbidopa-levodopa  mg (SINEMET)  mg per tablet Take 1 tablet by mouth 3 (three) times daily.    comp.stocking,knee,long,medium Misc 1 application by Misc.(Non-Drug; Combo Route) route daily as needed (Leg swelling).    lidocaine (LIDODERM) 5 % Place 1 patch onto the skin once daily. Remove & Discard patch within 12 hours or as directed by MD    midodrine (PROAMATINE) 10 MG tablet Take 0.5 tablets (5 mg total) by mouth 3 (three) times daily.    traMADol (ULTRAM) 50 mg tablet Take 50 mg by mouth every 12 (twelve) hours as needed for Pain.    warfarin (COUMADIN) 5 MG tablet Take 1 tablet (5 mg total) by mouth Daily.     Family History     Problem Relation (Age of Onset)    Cancer Father    No Known Problems Mother        Social History Main Topics    Smoking status: Former Smoker     Quit date: 8/25/1985    Smokeless tobacco: Never Used    Alcohol use 1.2 oz/week     2 Shots of liquor per week    Drug use: Unknown    Sexual activity: Yes     Partners: Female     Review of Systems   Constitutional: Negative for activity change, appetite change and chills.   HENT: Negative for congestion and facial swelling.    Eyes: Negative for discharge and itching.   Respiratory: Negative for apnea, chest tightness and shortness of breath.    Cardiovascular: Negative for chest pain, palpitations and leg swelling.   Gastrointestinal: Negative for abdominal distention, anal bleeding, blood in stool and constipation.   Endocrine: Negative for cold intolerance and heat intolerance.   Genitourinary: Positive for urgency. Negative for difficulty urinating and dysuria.   Musculoskeletal: Positive for arthralgias and back pain.   Skin: Negative for color change.   Allergic/Immunologic: Negative for environmental allergies and food  allergies.   Neurological: Negative for dizziness, facial asymmetry and headaches.   Hematological: Negative for adenopathy. Does not bruise/bleed easily.   Psychiatric/Behavioral: Negative for agitation and behavioral problems.     Objective:     Vital Signs (Most Recent):  Temp: 97.5 °F (36.4 °C) (11/04/17 0758)  Pulse: 77 (11/04/17 1047)  Resp: 16 (11/04/17 1047)  BP: (!) 168/81 (11/04/17 1047)  SpO2: 99 % (11/04/17 1047) Vital Signs (24h Range):  Temp:  [97.5 °F (36.4 °C)] 97.5 °F (36.4 °C)  Pulse:  [75-94] 77  Resp:  [7-19] 16  SpO2:  [98 %-99 %] 99 %  BP: ()/(42-92) 168/81     Weight: 98.9 kg (218 lb)  Body mass index is 33.15 kg/m².    Physical Exam   Constitutional: He appears well-developed and well-nourished.   HENT:   Head: Normocephalic and atraumatic.   Eyes: Conjunctivae and EOM are normal.   Neck: No JVD present. No tracheal deviation present.   Cardiovascular: Normal rate, regular rhythm and normal heart sounds.    Pulmonary/Chest: Effort normal and breath sounds normal.   Abdominal: Soft. Bowel sounds are normal.   Musculoskeletal: He exhibits edema (1+ BLE). He exhibits no deformity.   Neurological: He is alert.   Skin: Skin is warm and dry.   Psychiatric: He has a normal mood and affect. His behavior is normal.   Vitals reviewed.       Significant Labs:   Recent Results (from the past 24 hour(s))   Comprehensive metabolic panel    Collection Time: 11/04/17  8:40 AM   Result Value Ref Range    Sodium 143 136 - 145 mmol/L    Potassium 4.9 3.5 - 5.1 mmol/L    Chloride 108 95 - 110 mmol/L    CO2 25 23 - 29 mmol/L    Glucose 94 70 - 110 mg/dL    BUN, Bld 14 8 - 23 mg/dL    Creatinine 1.0 0.5 - 1.4 mg/dL    Calcium 9.5 8.7 - 10.5 mg/dL    Total Protein 7.2 6.0 - 8.4 g/dL    Albumin 3.1 (L) 3.5 - 5.2 g/dL    Total Bilirubin 1.1 (H) 0.1 - 1.0 mg/dL    Alkaline Phosphatase 57 55 - 135 U/L    AST 18 10 - 40 U/L    ALT 13 10 - 44 U/L    Anion Gap 10 8 - 16 mmol/L    eGFR if African American >60.0 >60  mL/min/1.73 m^2    eGFR if non African American >60.0 >60 mL/min/1.73 m^2   CBC auto differential    Collection Time: 11/04/17  8:40 AM   Result Value Ref Range    WBC 4.88 3.90 - 12.70 K/uL    RBC 3.72 (L) 4.60 - 6.20 M/uL    Hemoglobin 11.6 (L) 14.0 - 18.0 g/dL    Hematocrit 36.3 (L) 40.0 - 54.0 %    MCV 98 82 - 98 fL    MCH 31.2 (H) 27.0 - 31.0 pg    MCHC 32.0 32.0 - 36.0 g/dL    RDW 12.5 11.5 - 14.5 %    Platelets 193 150 - 350 K/uL    MPV 10.2 9.2 - 12.9 fL    Immature Granulocytes 0.4 0.0 - 0.5 %    Gran # 3.1 1.8 - 7.7 K/uL    Immature Grans (Abs) 0.02 0.00 - 0.04 K/uL    Lymph # 1.3 1.0 - 4.8 K/uL    Mono # 0.4 0.3 - 1.0 K/uL    Eos # 0.1 0.0 - 0.5 K/uL    Baso # 0.03 0.00 - 0.20 K/uL    nRBC 0 0 /100 WBC    Gran% 62.5 38.0 - 73.0 %    Lymph% 27.0 18.0 - 48.0 %    Mono% 7.2 4.0 - 15.0 %    Eosinophil% 2.3 0.0 - 8.0 %    Basophil% 0.6 0.0 - 1.9 %    Differential Method Automated    Brain natriuretic peptide    Collection Time: 11/04/17  8:40 AM   Result Value Ref Range    BNP 31 0 - 99 pg/mL   Lactic acid, plasma    Collection Time: 11/04/17  8:40 AM   Result Value Ref Range    Lactate (Lactic Acid) 2.6 (H) 0.5 - 2.2 mmol/L   Troponin I    Collection Time: 11/04/17  8:40 AM   Result Value Ref Range    Troponin I <0.006 0.000 - 0.026 ng/mL   Protime-INR    Collection Time: 11/04/17  8:40 AM   Result Value Ref Range    Prothrombin Time 14.7 (H) 9.0 - 12.5 sec    INR 1.4 (H) 0.8 - 1.2   Urinalysis, Reflex to Urine Culture Urine, Clean Catch    Collection Time: 11/04/17  9:12 AM   Result Value Ref Range    Specimen UA Urine, Clean Catch     Color, UA Yellow Yellow, Straw, Danni    Appearance, UA Cloudy (A) Clear    pH, UA 6.0 5.0 - 8.0    Specific Gravity, UA 1.015 1.005 - 1.030    Protein, UA 1+ (A) Negative    Glucose, UA Negative Negative    Ketones, UA Negative Negative    Bilirubin (UA) Negative Negative    Occult Blood UA 1+ (A) Negative    Nitrite, UA Negative Negative    Urobilinogen, UA 2.0 <2.0 EU/dL     Leukocytes, UA 2+ (A) Negative   Urinalysis Microscopic    Collection Time: 11/04/17  9:12 AM   Result Value Ref Range    RBC, UA 1 0 - 4 /hpf    WBC, UA 22 (H) 0 - 5 /hpf    Bacteria, UA Many (A) None-Occ /hpf    Squam Epithel, UA 1 /hpf    Hyaline Casts, UA 0 0-1/lpf /lpf    Microscopic Comment SEE COMMENT      Significant Imaging:   CXR 11/04/2017:   Cardiomegaly.    Assessment/Plan:     * LOC (loss of consciousness)    Likely vagal in setting of known orthostatic hypotension  -Episode of LOC during BM on toilet, similar presentation on 10/27  -Has been hypertensive this admission, holding midodrine with SBP ~ 160  -Afebrile without leukocytosis; CXR shows stable findings. UA with GPR/GPC possibly colonization  -EKG shows NSR. Trop WNL. CT head without acute findings last admission   -Recent echo from 10/2/2017 shows pEF with normal diastolic function; will not repeat  -EEG from 10/28: shows mild generalized slowing of overall waking; no evidence of seizures captured and no evidence of focal cerebral dysfunction.      Autonomic instability    -Continue midodrine 5mg TID with parameters and monitoring  -Adding fludrocortisone 100mcg daily   -Fall precautions  -Patient was scheduled for f/u with neurology s/p last discarge      Bacteriuria    86 y.o. M with GPR, GPC in UA, no c/o dysuria  -Ucx IP  -Received 1 dose rocephin in the Ed, Bactrim 800 BID for 2 days  -F/u UCx      Acute deep vein thrombosis (DVT) of popliteal vein of left lower extremity    - BLE US reveals DVT in L popliteal on prior admission   - started coumadin/lovenox bridge last admission, follows with coumadin clinic, INR subtherapeutic at 1.4  -Continue current dose and trend INR, pt will need to make scheduled appts in coumadin clinic      Parkinsonism    - Continue home Sinemet  - Outpatient f/u with neurology after present admission       CKD (chronic kidney disease) stage 2, GFR 60-89 ml/min    Stable at baseline  - Cr 1.0 on admit  - Avoid  nephrotoxic agents  - Will continue to monitor      Lower extremity edema    1+ BLE edema appears to be at baseline  -BNP 31, no echo on file.  CXR unremarkable.  -Will elevate BLE.  -Echo shows pEF without wall motion abnormalities      Mild cognitive impairment    Baseline        VTE Risk Mitigation      VTE Prophylaxis: warfarin (COUMADIN) tablet 5 mg  Daily  Route: Oral    Diet: Adult Regular diet  Code Status: Full Code  Tubes/Lines/Drains: Peripheral IV - Single Lumen 11/04/17 0840 Right Antecubital   11/04/17 1023     Navi Espinoza MD  Department of Hospital Medicine   [facility]

## 2017-11-04 NOTE — CARE UPDATE
"Code status was discussed with patient who is AAOX3 and medically appropriate to determine code status for himself. He stated that in the event of a cardiac arrest he would like to receive chest compressions and medications post arrest, but he does not wish to be mechanically ventilated. He stated "i'm 86 years old, if its temporary that ok, but I don't want tubes in me." Patient was able to verbalize understanding/implication of his decision.     Code Status: Partial Code    Navi Espinoza MD  Internal Medicine PGY-1  [facility]    "

## 2017-11-04 NOTE — CARE UPDATE
Attempted to contact patient's wife, Alem ([phone], [phone]). No answer. Left message with callback number.    SILKE Boucher MD   PGY-3  [phone]

## 2017-11-04 NOTE — SUBJECTIVE & OBJECTIVE
Past Medical History:   Diagnosis Date    Anemia     Chronic back pain     Hypertension     Lumbago     Orthostatic hypotension dysautonomic syndrome     Parkinson disease     Renal disorder        Past Surgical History:   Procedure Laterality Date    BACK SURGERY  1961    ROTATOR CUFF REPAIR         Review of patient's allergies indicates:  No Known Allergies    No current facility-administered medications on file prior to encounter.      Current Outpatient Prescriptions on File Prior to Encounter   Medication Sig    acetaminophen (TYLENOL) 325 MG tablet Take 2 tablets (650 mg total) by mouth every 4 (four) hours as needed for Pain.    carbidopa-levodopa  mg (SINEMET)  mg per tablet Take 1 tablet by mouth 3 (three) times daily.    comp.stocking,knee,long,medium Misc 1 application by Misc.(Non-Drug; Combo Route) route daily as needed (Leg swelling).    lidocaine (LIDODERM) 5 % Place 1 patch onto the skin once daily. Remove & Discard patch within 12 hours or as directed by MD    midodrine (PROAMATINE) 10 MG tablet Take 0.5 tablets (5 mg total) by mouth 3 (three) times daily.    traMADol (ULTRAM) 50 mg tablet Take 50 mg by mouth every 12 (twelve) hours as needed for Pain.    warfarin (COUMADIN) 5 MG tablet Take 1 tablet (5 mg total) by mouth Daily.     Family History     Problem Relation (Age of Onset)    Cancer Father    No Known Problems Mother        Social History Main Topics    Smoking status: Former Smoker     Quit date: 8/25/1985    Smokeless tobacco: Never Used    Alcohol use 1.2 oz/week     2 Shots of liquor per week    Drug use: Unknown    Sexual activity: Yes     Partners: Female     Review of Systems   Constitutional: Negative for activity change, appetite change and chills.   HENT: Negative for congestion and facial swelling.    Eyes: Negative for discharge and itching.   Respiratory: Negative for apnea, chest tightness and shortness of breath.    Cardiovascular: Negative  for chest pain, palpitations and leg swelling.   Gastrointestinal: Negative for abdominal distention, anal bleeding, blood in stool and constipation.   Endocrine: Negative for cold intolerance and heat intolerance.   Genitourinary: Positive for urgency. Negative for difficulty urinating and dysuria.   Musculoskeletal: Positive for arthralgias and back pain.   Skin: Negative for color change.   Allergic/Immunologic: Negative for environmental allergies and food allergies.   Neurological: Negative for dizziness, facial asymmetry and headaches.   Hematological: Negative for adenopathy. Does not bruise/bleed easily.   Psychiatric/Behavioral: Negative for agitation and behavioral problems.     Objective:     Vital Signs (Most Recent):  Temp: 97.5 °F (36.4 °C) (11/04/17 0758)  Pulse: 77 (11/04/17 1047)  Resp: 16 (11/04/17 1047)  BP: (!) 168/81 (11/04/17 1047)  SpO2: 99 % (11/04/17 1047) Vital Signs (24h Range):  Temp:  [97.5 °F (36.4 °C)] 97.5 °F (36.4 °C)  Pulse:  [75-94] 77  Resp:  [7-19] 16  SpO2:  [98 %-99 %] 99 %  BP: ()/(42-92) 168/81     Weight: 98.9 kg (218 lb)  Body mass index is 33.15 kg/m².    Physical Exam   Constitutional: He appears well-developed and well-nourished.   HENT:   Head: Normocephalic and atraumatic.   Eyes: Conjunctivae and EOM are normal.   Neck: No JVD present. No tracheal deviation present.   Cardiovascular: Normal rate, regular rhythm and normal heart sounds.    Pulmonary/Chest: Effort normal and breath sounds normal.   Abdominal: Soft. Bowel sounds are normal.   Musculoskeletal: He exhibits edema (1+ BLE). He exhibits no deformity.   Neurological: He is alert.   Skin: Skin is warm and dry.   Psychiatric: He has a normal mood and affect. His behavior is normal.   Vitals reviewed.       Significant Labs:   Recent Results (from the past 24 hour(s))   Comprehensive metabolic panel    Collection Time: 11/04/17  8:40 AM   Result Value Ref Range    Sodium 143 136 - 145 mmol/L    Potassium  4.9 3.5 - 5.1 mmol/L    Chloride 108 95 - 110 mmol/L    CO2 25 23 - 29 mmol/L    Glucose 94 70 - 110 mg/dL    BUN, Bld 14 8 - 23 mg/dL    Creatinine 1.0 0.5 - 1.4 mg/dL    Calcium 9.5 8.7 - 10.5 mg/dL    Total Protein 7.2 6.0 - 8.4 g/dL    Albumin 3.1 (L) 3.5 - 5.2 g/dL    Total Bilirubin 1.1 (H) 0.1 - 1.0 mg/dL    Alkaline Phosphatase 57 55 - 135 U/L    AST 18 10 - 40 U/L    ALT 13 10 - 44 U/L    Anion Gap 10 8 - 16 mmol/L    eGFR if African American >60.0 >60 mL/min/1.73 m^2    eGFR if non African American >60.0 >60 mL/min/1.73 m^2   CBC auto differential    Collection Time: 11/04/17  8:40 AM   Result Value Ref Range    WBC 4.88 3.90 - 12.70 K/uL    RBC 3.72 (L) 4.60 - 6.20 M/uL    Hemoglobin 11.6 (L) 14.0 - 18.0 g/dL    Hematocrit 36.3 (L) 40.0 - 54.0 %    MCV 98 82 - 98 fL    MCH 31.2 (H) 27.0 - 31.0 pg    MCHC 32.0 32.0 - 36.0 g/dL    RDW 12.5 11.5 - 14.5 %    Platelets 193 150 - 350 K/uL    MPV 10.2 9.2 - 12.9 fL    Immature Granulocytes 0.4 0.0 - 0.5 %    Gran # 3.1 1.8 - 7.7 K/uL    Immature Grans (Abs) 0.02 0.00 - 0.04 K/uL    Lymph # 1.3 1.0 - 4.8 K/uL    Mono # 0.4 0.3 - 1.0 K/uL    Eos # 0.1 0.0 - 0.5 K/uL    Baso # 0.03 0.00 - 0.20 K/uL    nRBC 0 0 /100 WBC    Gran% 62.5 38.0 - 73.0 %    Lymph% 27.0 18.0 - 48.0 %    Mono% 7.2 4.0 - 15.0 %    Eosinophil% 2.3 0.0 - 8.0 %    Basophil% 0.6 0.0 - 1.9 %    Differential Method Automated    Brain natriuretic peptide    Collection Time: 11/04/17  8:40 AM   Result Value Ref Range    BNP 31 0 - 99 pg/mL   Lactic acid, plasma    Collection Time: 11/04/17  8:40 AM   Result Value Ref Range    Lactate (Lactic Acid) 2.6 (H) 0.5 - 2.2 mmol/L   Troponin I    Collection Time: 11/04/17  8:40 AM   Result Value Ref Range    Troponin I <0.006 0.000 - 0.026 ng/mL   Protime-INR    Collection Time: 11/04/17  8:40 AM   Result Value Ref Range    Prothrombin Time 14.7 (H) 9.0 - 12.5 sec    INR 1.4 (H) 0.8 - 1.2   Urinalysis, Reflex to Urine Culture Urine, Clean Catch    Collection  Time: 11/04/17  9:12 AM   Result Value Ref Range    Specimen UA Urine, Clean Catch     Color, UA Yellow Yellow, Straw, Danni    Appearance, UA Cloudy (A) Clear    pH, UA 6.0 5.0 - 8.0    Specific Gravity, UA 1.015 1.005 - 1.030    Protein, UA 1+ (A) Negative    Glucose, UA Negative Negative    Ketones, UA Negative Negative    Bilirubin (UA) Negative Negative    Occult Blood UA 1+ (A) Negative    Nitrite, UA Negative Negative    Urobilinogen, UA 2.0 <2.0 EU/dL    Leukocytes, UA 2+ (A) Negative   Urinalysis Microscopic    Collection Time: 11/04/17  9:12 AM   Result Value Ref Range    RBC, UA 1 0 - 4 /hpf    WBC, UA 22 (H) 0 - 5 /hpf    Bacteria, UA Many (A) None-Occ /hpf    Squam Epithel, UA 1 /hpf    Hyaline Casts, UA 0 0-1/lpf /lpf    Microscopic Comment SEE COMMENT      Significant Imaging:   CXR 11/04/2017:   Cardiomegaly.

## 2017-11-04 NOTE — ED NOTES
Patient remains on continuous cardiac monitor, automatic blood pressure cuff and continuous pulse oximeter.

## 2017-11-04 NOTE — ASSESSMENT & PLAN NOTE
- BLE US reveals DVT in L popliteal on prior admission   - started coumadin/lovenox bridge last admission, follows with coumadin clinic, INR IP  - INR daily

## 2017-11-04 NOTE — HPI
Tona Dey is a 86 y.o. M with parkinson's disease, CKD stage 2, DVT (on coumadin, follows with coumadin clinic), autonomic instability w/ orthostatis who p/t ED from home via EMS after unwitnessed syncopal episode while using bathroom earlier today. Patient wife was at home at the time though she did not accompany patient to the Ed. Patient states that he does not remember the event and does not believe that he hit his head. Patient was recently [place] [facility] [place]) after a similar presentation, CT negative, UA normal during that admission. Thought to be delirium from polypharmacy, received haldol in ED on DOA, was AAOx3 HD#2, was D/c to home on HD#3 with PT/OT and neuro F/u. He denies CP/SOB/N/V/F/C.  ROS positive for chronic arthritic pain in his back and knee and urinary urgency.

## 2017-11-04 NOTE — HOSPITAL COURSE
86 y.o. M with parkinson's disease, CKD stage 2, DVT (on coumadin, follows with coumadin clinic), autonomic instability w/ orthostatis who p/t ED from home via EMS after unwitnessed syncopal episode while using bathroom earlier today. This has been a frequent occurance for this patient. The risks and benefits of his medications were discussed with length with the patient. Specifically regarding the use of fludrocortisone and midodrine and their affect on his BP given that he swings between hypotensive episodes when erect and symptomatic hypertension when supine. The use of coumadin in the setting of recent DVT and also recent falls was discussed as well. Mack will remain in the hospital for one more night as medically appropriate and be discharged to home in the morning. DNR status and the option to consider home hospice for to be discussed with patient and wife. He was educated on the importance of plenty of salt and fluid intake to support his BP as well as the use of compression stockings. His wife is very fluent in the appropriate usage of his medications and admits that she is having a hard time managing the patients frequent syncopal episodes resulting in hospital admissions at this point. He will need close f/u with PCP s/p discharge.

## 2017-11-04 NOTE — ED NOTES
LOC: The patient is awake, alert and aware of environment with an appropriate affect, the patient is speaking appropriately, although speaks slowly.   APPEARANCE: Patient resting comfortably and in no acute distress, patient arrived wearing adult diaper only, no clothing on body. Pt placed into hospital gown and given sheet/blanket. Patient slightly malodorous. Elderly.  SKIN: The skin is warm and dry, color consistent with ethnicity, patient has moist mucus membranes, skin intact, no breakdown noted.  MUSKULOSKELETAL: Patient moving all extremities well, no obvious swelling or deformities noted.  RESPIRATORY: Airway is open and patent, respirations are spontaneous, patient has a normal effort and rate, no accessory muscle use noted.   CARDIAC: Patient has a normal rate and rhythm.  NEUROLOGIC: Eyes open spontaneously, behavior appropriate to situation, follows commands

## 2017-11-04 NOTE — ED NOTES
The patient was brought to the ER today by EMS after his wife found him passed out on the toilet while trying to have a bowel movement. Pt is awake and alert. Oriented to situation

## 2017-11-04 NOTE — ASSESSMENT & PLAN NOTE
1+ BLE edema appears to be at baseline  -BNP 31, no echo on file.  CXR unremarkable.  -Will elevate BLE.  -Echo shows pEF without wall motion abnormalities

## 2017-11-05 LAB
ALBUMIN SERPL BCP-MCNC: 2.8 G/DL
ALP SERPL-CCNC: 56 U/L
ALT SERPL W/O P-5'-P-CCNC: <5 U/L
ANION GAP SERPL CALC-SCNC: 9 MMOL/L
AST SERPL-CCNC: 15 U/L
BACTERIA UR CULT: NORMAL
BASOPHILS # BLD AUTO: 0.03 K/UL
BASOPHILS NFR BLD: 0.6 %
BILIRUB SERPL-MCNC: 0.6 MG/DL
BUN SERPL-MCNC: 15 MG/DL
CALCIUM SERPL-MCNC: 8.9 MG/DL
CHLORIDE SERPL-SCNC: 110 MMOL/L
CO2 SERPL-SCNC: 20 MMOL/L
CREAT SERPL-MCNC: 0.9 MG/DL
DIFFERENTIAL METHOD: ABNORMAL
EOSINOPHIL # BLD AUTO: 0.2 K/UL
EOSINOPHIL NFR BLD: 3.2 %
ERYTHROCYTE [DISTWIDTH] IN BLOOD BY AUTOMATED COUNT: 12.5 %
EST. GFR  (AFRICAN AMERICAN): >60 ML/MIN/1.73 M^2
EST. GFR  (NON AFRICAN AMERICAN): >60 ML/MIN/1.73 M^2
GLUCOSE SERPL-MCNC: 86 MG/DL
HCT VFR BLD AUTO: 34 %
HGB BLD-MCNC: 10.7 G/DL
IMM GRANULOCYTES # BLD AUTO: 0.02 K/UL
IMM GRANULOCYTES NFR BLD AUTO: 0.4 %
INR PPP: 1.5
LYMPHOCYTES # BLD AUTO: 2.3 K/UL
LYMPHOCYTES NFR BLD: 49.7 %
MCH RBC QN AUTO: 30.8 PG
MCHC RBC AUTO-ENTMCNC: 31.5 G/DL
MCV RBC AUTO: 98 FL
MONOCYTES # BLD AUTO: 0.3 K/UL
MONOCYTES NFR BLD: 6.7 %
NEUTROPHILS # BLD AUTO: 1.8 K/UL
NEUTROPHILS NFR BLD: 39.4 %
NRBC BLD-RTO: 0 /100 WBC
PLATELET # BLD AUTO: 144 K/UL
PMV BLD AUTO: 11 FL
POTASSIUM SERPL-SCNC: 4.2 MMOL/L
PROT SERPL-MCNC: 6.2 G/DL
PROTHROMBIN TIME: 15.7 SEC
RBC # BLD AUTO: 3.47 M/UL
SODIUM SERPL-SCNC: 139 MMOL/L
WBC # BLD AUTO: 4.63 K/UL

## 2017-11-05 PROCEDURE — 97161 PT EVAL LOW COMPLEX 20 MIN: CPT

## 2017-11-05 PROCEDURE — 25000003 PHARM REV CODE 250: Performed by: INTERNAL MEDICINE

## 2017-11-05 PROCEDURE — 11000001 HC ACUTE MED/SURG PRIVATE ROOM

## 2017-11-05 PROCEDURE — 86580 TB INTRADERMAL TEST: CPT | Performed by: INTERNAL MEDICINE

## 2017-11-05 PROCEDURE — 25000003 PHARM REV CODE 250: Performed by: STUDENT IN AN ORGANIZED HEALTH CARE EDUCATION/TRAINING PROGRAM

## 2017-11-05 PROCEDURE — 85025 COMPLETE CBC W/AUTO DIFF WBC: CPT

## 2017-11-05 PROCEDURE — 80053 COMPREHEN METABOLIC PANEL: CPT

## 2017-11-05 PROCEDURE — 97530 THERAPEUTIC ACTIVITIES: CPT

## 2017-11-05 PROCEDURE — 63600175 PHARM REV CODE 636 W HCPCS: Performed by: INTERNAL MEDICINE

## 2017-11-05 PROCEDURE — 99232 SBSQ HOSP IP/OBS MODERATE 35: CPT | Mod: GC,,, | Performed by: HOSPITALIST

## 2017-11-05 PROCEDURE — 36415 COLL VENOUS BLD VENIPUNCTURE: CPT

## 2017-11-05 PROCEDURE — A4216 STERILE WATER/SALINE, 10 ML: HCPCS | Performed by: STUDENT IN AN ORGANIZED HEALTH CARE EDUCATION/TRAINING PROGRAM

## 2017-11-05 PROCEDURE — 97166 OT EVAL MOD COMPLEX 45 MIN: CPT

## 2017-11-05 PROCEDURE — 85610 PROTHROMBIN TIME: CPT

## 2017-11-05 RX ORDER — ADHESIVE BANDAGE
30 BANDAGE TOPICAL DAILY PRN
COMMUNITY

## 2017-11-05 RX ORDER — TRAMADOL HYDROCHLORIDE 50 MG/1
50 TABLET ORAL EVERY 6 HOURS PRN
Status: DISCONTINUED | OUTPATIENT
Start: 2017-11-05 | End: 2017-11-07 | Stop reason: HOSPADM

## 2017-11-05 RX ORDER — AMOXICILLIN AND CLAVULANATE POTASSIUM 500; 125 MG/1; MG/1
1 TABLET, FILM COATED ORAL 3 TIMES DAILY
Status: COMPLETED | OUTPATIENT
Start: 2017-11-05 | End: 2017-11-07

## 2017-11-05 RX ADMIN — CARBIDOPA AND LEVODOPA 1 TABLET: 25; 100 TABLET ORAL at 09:11

## 2017-11-05 RX ADMIN — AMOXICILLIN AND CLAVULANATE POTASSIUM 500 MG: 500; 125 TABLET, FILM COATED ORAL at 01:11

## 2017-11-05 RX ADMIN — POLYETHYLENE GLYCOL 3350 17 G: 17 POWDER, FOR SOLUTION ORAL at 09:11

## 2017-11-05 RX ADMIN — Medication 3 ML: at 09:11

## 2017-11-05 RX ADMIN — POLYETHYLENE GLYCOL 3350 17 G: 17 POWDER, FOR SOLUTION ORAL at 08:11

## 2017-11-05 RX ADMIN — Medication 3 ML: at 06:11

## 2017-11-05 RX ADMIN — ACETAMINOPHEN 650 MG: 325 TABLET ORAL at 01:11

## 2017-11-05 RX ADMIN — TRAMADOL HYDROCHLORIDE 50 MG: 50 TABLET, FILM COATED ORAL at 04:11

## 2017-11-05 RX ADMIN — WARFARIN SODIUM 5 MG: 5 TABLET ORAL at 04:11

## 2017-11-05 RX ADMIN — FLUDROCORTISONE ACETATE 100 MCG: 0.1 TABLET ORAL at 08:11

## 2017-11-05 RX ADMIN — SULFAMETHOXAZOLE AND TRIMETHOPRIM 2 TABLET: 800; 160 TABLET ORAL at 08:11

## 2017-11-05 RX ADMIN — Medication 3 ML: at 02:11

## 2017-11-05 RX ADMIN — CARBIDOPA AND LEVODOPA 1 TABLET: 25; 100 TABLET ORAL at 01:11

## 2017-11-05 RX ADMIN — CARBIDOPA AND LEVODOPA 1 TABLET: 25; 100 TABLET ORAL at 06:11

## 2017-11-05 RX ADMIN — MIDODRINE HYDROCHLORIDE 5 MG: 5 TABLET ORAL at 01:11

## 2017-11-05 RX ADMIN — MIDODRINE HYDROCHLORIDE 5 MG: 5 TABLET ORAL at 08:11

## 2017-11-05 RX ADMIN — AMOXICILLIN AND CLAVULANATE POTASSIUM 500 MG: 500; 125 TABLET, FILM COATED ORAL at 09:11

## 2017-11-05 RX ADMIN — MIDODRINE HYDROCHLORIDE 5 MG: 5 TABLET ORAL at 09:11

## 2017-11-05 RX ADMIN — Medication 5 UNITS: at 09:11

## 2017-11-05 NOTE — PHARMACY MED REC
"MedMined Medication Reconciliation  Template    Admission Medication Reconciliation - Pharmacy Consult Note    The home medication history was taken by Gail Quiles, Pharmacy Technician.  Based on information gathered and subsequent review by the clinical pharmacist, the items below may need attention.     You may go to "Admission" then "Reconcile Home Medications" tabs to review and/or act upon these items. Based on information gathered and subsequent review by the clinical pharmacist, the items below may need attention.    Potentially problematic discrepancies with current MAR  o Patient is taking a drug DIFFERENTLY than how ordered upon admit  o Midodrine 10mg po TID (holds for "high blood pressure")     Please address this information as you see fit.  Feel free to contact us if you have any questions or require assistance.    Ivonne Elizabeth, PharmD, PGY-2 Internal Medicine Pharmacy Resident   [id number]      Patient's prior to admission medication regimen was as follows:  Prescriptions Prior to Admission   Medication Sig Dispense Refill Last Dose    acetaminophen (TYLENOL) 325 MG tablet Take 2 tablets (650 mg total) by mouth every 4 (four) hours as needed for Pain. 30 tablet 0 Taking    carbidopa-levodopa  mg (SINEMET)  mg per tablet Take 1 tablet by mouth 3 (three) times daily. 90 tablet 0 10/27/2017    magnesium hydroxide 400 mg/5 ml (MILK OF MAGNESIA) 400 mg/5 mL Susp Take 30 mLs by mouth daily as needed (constipation).       midodrine (PROAMATINE) 10 MG tablet Take 0.5 tablets (5 mg total) by mouth 3 (three) times daily. (Patient taking differently: Take 10 mg by mouth 3 (three) times daily. ) 90 tablet 0 10/27/2017    ranitidine (ZANTAC) 300 MG tablet Take 300 mg by mouth daily as needed for Heartburn.       traMADol (ULTRAM) 50 mg tablet Take 50 mg by mouth every 12 (twelve) hours as needed for Pain.   Past Week    warfarin (COUMADIN) 5 MG tablet Take 1 tablet (5 mg total) by " mouth Daily. 30 tablet 0 10/26/2017    comp.stocking,knee,long,medium Misc 1 application by Misc.(Non-Drug; Combo Route) route daily as needed (Leg swelling). 10 application 0 Unknown         Please add appropriate    SmartPhrase below:

## 2017-11-05 NOTE — PT/OT/SLP EVAL
Physical Therapy  Evaluation    Tona Dey   MRN: [id number]   Admitting Diagnosis: LOC (loss of consciousness)    PT Received On: 11/05/17  PT Start Time: 1338     PT Stop Time: 1403    PT Total Time (min): 25 min       Billable Minutes:  Evaluation 25    Diagnosis: LOC (loss of consciousness)  Recent hospital admit    Past Medical History:   Diagnosis Date    Anemia     Chronic back pain     Hypertension     Lumbago     Orthostatic hypotension dysautonomic syndrome     Parkinson disease     Renal disorder       Past Surgical History:   Procedure Laterality Date    BACK SURGERY  1961    ROTATOR CUFF REPAIR         Referring physician: Misty  Date referred to PT: 11/3/17    General Precautions: Standard, fall  Orthopedic Precautions: N/A   Braces: N/A       Do you have any cultural, spiritual, Jainism conflicts, given your current situation?: none stated    Patient History:  Lives With: spouse  Living Arrangements: house  Home Accessibility:  (no concerns)  Transportation Available: family or friend will provide  Living Environment Comment: Patient lives with wife in 1-story no [place]. Ambulates using rolling walker PTA; however, reports not having walked the past two weeks secondary to weakness. Wife assists with UB/LB dressing. Indep sponge bath in sink. Wife avaialble to upon discharge.  Equipment Currently Used at Home: cane, straight, walker, rolling, shower chair, bath bench  DME owned (not currently used): none    Previous Level of Function:  Ambulation Skills: needs device  Transfer Skills: needs device  ADL Skills: needs assist    Subjective:  Communicated with RN prior to session.  Patient agreeable to PT session. No reports of recent falls. Reports functional decline x 2 weeks being unable to ambulate. States he has been in bed the past 4 days.  Chief Complaint: weakness  Patient goals: increase general strength    Pain/Comfort  Pain Rating 1: 0/10  Pain Rating Post-Intervention 1:  0/10      Objective:   Patient found with: telemetry, bed alarm     Cognitive Exam:  Oriented to: Person, Place, Time and Situation    Follows Commands/attention: Follows multistep  commands  Communication: clear/fluent  Safety awareness/insight to disability: intact    Physical Exam:  Postural examination/scapula alignment: Rounded shoulder and Head forward    Skin integrity: Visible skin intact  Edema: Mild L foot    Sensation:   Intact to light touch BLE    Lower Extremity Range of Motion:  Right Lower Extremity: WFL  Left Lower Extremity: WFL    Lower Extremity Strength:  Right Lower Extremity: 3+/5  Left Lower Extremity: 3+/5     Gross motor coordination: impaired secondary to weakness and impaired dynamic balance    Functional Mobility:  Bed Mobility:  Rolling/Turning Right: Contact guard assistance  Scooting/Bridging: Minimum Assistance (seated EOB)  Supine to Sit: Contact Guard Assistance  Sit to Supine: Minimum Assistance, With leg lift    Transfers:  Sit <> Stand Assistance: Minimum Assistance  Sit <> Stand Assistive Device:  (HHA)  Bed <> Chair Assistance: Activity did not occur    Gait:   Gait Distance: 3 sidesteps EOB  Assistance 1: Moderate assistance  Gait Assistive Device: Hand held assist (with OT assist)  Gait Pattern: swing-through gait  Gait Deviation(s): decreased laura, increased time in double stance, decreased velocity of limb motion, decreased step length, decreased stride length, decreased weight-shifting ability, decreased toe-to-floor clearance    Balance:   Static Sit: NORMAL: No deviations seen in posture held statically  Dynamic Sit: FAIR+: Maintains balance through MINIMAL excursions of active trunk motion  Static Stand: POOR+: Needs MINIMAL assist to maintain  Dynamic stand: POOR: N/A Mod Assist    Therapeutic Activities and Exercises:  Patient educated on role of PT/POC. Co-evaluation with OT.    Slowed movement with all functional mobility.  Bed mobility with CGA for tactile cue  for LE progression and verbal cues for hand placement  Min Assist for seated scoot    Sit<>stand with Min Assist x2 with OT assist; B/L HHA  Weightshifting performed in standing with bilateral HHA x 10 reps in each direction    3 sidesteps EOB with Mod Assist for weight shift and tactile cues for increased step length.  Returned to supine position with Mod Assist for trunk and LLE management.    Refer to OT note for supine and sitting BP measurements.    Safe to transfer with assist of 1 at this time.    AM-PAC 6 CLICK MOBILITY  How much help from another person does this patient currently need?   1 = Unable, Total/Dependent Assistance  2 = A lot, Maximum/Moderate Assistance  3 = A little, Minimum/Contact Guard/Supervision  4 = None, Modified [place]/Independent    Turning over in bed (including adjusting bedclothes, sheets and blankets)?: 3  Sitting down on and standing up from a chair with arms (e.g., wheelchair, bedside commode, etc.): 3  Moving from lying on back to sitting on the side of the bed?: 3  Moving to and from a bed to a chair (including a wheelchair)?: 2  Need to walk in hospital room?: 2  Climbing 3-5 steps with a railing?: 1  Total Score: 14     AM-PAC Raw Score CMS G-Code Modifier Level of Impairment Assistance   6 % Total / Unable   7 - 9 CM 80 - 100% Maximal Assist   10 - 14 CL 60 - 80% Moderate Assist   15 - 19 CK 40 - 60% Moderate Assist   20 - 22 CJ 20 - 40% Minimal Assist   23 CI 1-20% SBA / CGA   24 CH 0% Independent/ Mod I     Patient left supine with all lines intact, call button in reach and RN notified.    Assessment:   Tona Dey is a 86 y.o. male with a medical diagnosis of LOC (loss of consciousness) and presents with rehab identified impairments listed below. Slowed movement with all functional mobility tasks. Bed mobility with CGA/Min Assist. Sit<>stand with Min Assist/HHA with OT assist. During static stand, patient with significant posterior lean and minor  bilateral knee buckling. Sidesteps performed EOB with Mod Assist for weight shift and tactile cues for increased step length. To benefit from continued skilled intervention to address deficits prior to transition to SNF to improve safety and overall functional mobility.    History: personal factors and/or comorbidities that impact the plan of care: LOC; recent hospital admit; fall risk; patient requiring increased level of assist at this time.  Evaluation of Body Systems: cardiovascular/pulmonary, integumentary, musculoskeletal, neuromuscular, cognition/communication  Functional Outcome Tools: AMPAC, ROM, MMT  Clinical Presentation: stable     Evaluation Complexity Level: Low    Rehab identified problem list/impairments: Rehab identified problem list/impairments: weakness, impaired endurance, impaired self care skills, impaired functional mobilty, gait instability, impaired balance, decreased coordination, decreased lower extremity function, decreased upper extremity function, impaired coordination    Rehab potential is good.    Activity tolerance: Fair    Discharge recommendations: Discharge Facility/Level Of Care Needs: nursing facility, skilled     Barriers to discharge: Barriers to Discharge: Decreased caregiver support (patient requiring significant level of increased assistance at this time)    Equipment recommendations: Equipment Needed After Discharge: none     GOALS:    Physical Therapy Goals        Problem: Physical Therapy Goal    Goal Priority Disciplines Outcome Goal Variances Interventions   Physical Therapy Goal     PT/OT, PT Ongoing (interventions implemented as appropriate)     Description:  Goals to be met by: 11/15/2017    Patient will increase functional independence with mobility by performin. Supine to sit with Modified [place]  2. Sit to supine with Modified [place]  3. Sit to stand transfer with Contact Guard Assistance  4. Bed to chair transfer with Minimal Assistance using  Rolling Walker  5. Gait  x 25 feet with Minimal Assistance using Rolling Walker.                       PLAN:    Patient to be seen 3 x/week to address the above listed problems via gait training, therapeutic activities, therapeutic exercises, neuromuscular re-education  Plan of Care expires: 12/05/17  Plan of Care reviewed with: patient          Black SULEIMAN Santo III, PT  11/05/2017

## 2017-11-05 NOTE — SUBJECTIVE & OBJECTIVE
Interval History: POLO. Mr. Pineda was seen this Am, wife was at bedside later on in the [facility] as well. The risks and benefits of his medications were discussed with length with the patient. Specifically regarding the use of fludrocortisone and midodrine and their affect on his BP given that he swings between hypotensive episodes when erect and symptomatic hypertension when supine. The use of coumadin in the setting of recent DVT and also recent falls was discussed as well. Mack will remain in the hospital for one more night as medically appropriate and be discharged to home in the morning. DNR status and the option to consider home hospice for to be discussed with patient and wife. He was educated on the importance of plenty of salt and fluid intake to support his BP as well as the use of compression stockings. His wife is very fluent in the appropriate usage of his medications and admits that she is having a hard time managing the patients frequent syncopal episodes resulting in hospital admissions at this point. He will need close f/u with PCP s/p discharge.     Review of Systems   Constitutional: Negative for activity change, appetite change and chills.   HENT: Negative for congestion and facial swelling.    Eyes: Negative for discharge and itching.   Respiratory: Negative for apnea, chest tightness and shortness of breath.    Cardiovascular: Negative for chest pain, palpitations and leg swelling.   Gastrointestinal: Negative for abdominal distention, anal bleeding, blood in stool and constipation.   Endocrine: Negative for cold intolerance and heat intolerance.   Genitourinary: Positive for urgency. Negative for difficulty urinating and dysuria.   Musculoskeletal: Positive for arthralgias and back pain.   Skin: Negative for color change.   Allergic/Immunologic: Negative for environmental allergies and food allergies.   Neurological: Negative for dizziness, facial asymmetry and headaches.   Hematological:  Negative for adenopathy. Does not bruise/bleed easily.   Psychiatric/Behavioral: Negative for agitation and behavioral problems.     Objective:     Vital Signs (Most Recent):  Temp: 98 °F (36.7 °C) (11/05/17 1150)  Pulse: 87 (11/05/17 1150)  Resp: 18 (11/05/17 1150)  BP: 137/82 (11/05/17 1150)  SpO2: 98 % (11/05/17 1150) Vital Signs (24h Range):  Temp:  [97.6 °F (36.4 °C)-98.1 °F (36.7 °C)] 98 °F (36.7 °C)  Pulse:  [72-87] 87  Resp:  [15-20] 18  SpO2:  [96 %-99 %] 98 %  BP: ()/(47-90) 137/82     Weight: 98.9 kg (218 lb)  Body mass index is 33.15 kg/m².  No intake or output data in the 24 hours ending 11/05/17 1233   Physical Exam   Constitutional: He is oriented to person, place, and time. He appears well-developed and well-nourished.   HENT:   Head: Normocephalic and atraumatic.   Eyes: Conjunctivae and EOM are normal.   Neck: No JVD present. No tracheal deviation present.   Cardiovascular: Normal rate, regular rhythm and normal heart sounds.    Pulmonary/Chest: Effort normal and breath sounds normal.   Abdominal: Soft. Bowel sounds are normal.   Musculoskeletal: He exhibits edema (1+ BLE). He exhibits no deformity.   Neurological: He is alert and oriented to person, place, and time.   Skin: Skin is warm and dry.   Psychiatric: He has a normal mood and affect. His behavior is normal.   Vitals reviewed.      Significant Labs:   Recent Results (from the past 24 hour(s))   Lactic acid, plasma    Collection Time: 11/04/17  9:32 PM   Result Value Ref Range    Lactate (Lactic Acid) 1.3 0.5 - 2.2 mmol/L   CBC auto differential    Collection Time: 11/05/17  3:50 AM   Result Value Ref Range    WBC 4.63 3.90 - 12.70 K/uL    RBC 3.47 (L) 4.60 - 6.20 M/uL    Hemoglobin 10.7 (L) 14.0 - 18.0 g/dL    Hematocrit 34.0 (L) 40.0 - 54.0 %    MCV 98 82 - 98 fL    MCH 30.8 27.0 - 31.0 pg    MCHC 31.5 (L) 32.0 - 36.0 g/dL    RDW 12.5 11.5 - 14.5 %    Platelets 144 (L) 150 - 350 K/uL    MPV 11.0 9.2 - 12.9 fL    Immature  Granulocytes 0.4 0.0 - 0.5 %    Gran # 1.8 1.8 - 7.7 K/uL    Immature Grans (Abs) 0.02 0.00 - 0.04 K/uL    Lymph # 2.3 1.0 - 4.8 K/uL    Mono # 0.3 0.3 - 1.0 K/uL    Eos # 0.2 0.0 - 0.5 K/uL    Baso # 0.03 0.00 - 0.20 K/uL    nRBC 0 0 /100 WBC    Gran% 39.4 38.0 - 73.0 %    Lymph% 49.7 (H) 18.0 - 48.0 %    Mono% 6.7 4.0 - 15.0 %    Eosinophil% 3.2 0.0 - 8.0 %    Basophil% 0.6 0.0 - 1.9 %    Differential Method Automated    Comprehensive metabolic panel    Collection Time: 11/05/17  3:50 AM   Result Value Ref Range    Sodium 139 136 - 145 mmol/L    Potassium 4.2 3.5 - 5.1 mmol/L    Chloride 110 95 - 110 mmol/L    CO2 20 (L) 23 - 29 mmol/L    Glucose 86 70 - 110 mg/dL    BUN, Bld 15 8 - 23 mg/dL    Creatinine 0.9 0.5 - 1.4 mg/dL    Calcium 8.9 8.7 - 10.5 mg/dL    Total Protein 6.2 6.0 - 8.4 g/dL    Albumin 2.8 (L) 3.5 - 5.2 g/dL    Total Bilirubin 0.6 0.1 - 1.0 mg/dL    Alkaline Phosphatase 56 55 - 135 U/L    AST 15 10 - 40 U/L    ALT <5 (L) 10 - 44 U/L    Anion Gap 9 8 - 16 mmol/L    eGFR if African American >60.0 >60 mL/min/1.73 m^2    eGFR if non African American >60.0 >60 mL/min/1.73 m^2     Significant Imaging:   CXR 11/04/2017:  Cardiomegaly.

## 2017-11-05 NOTE — PLAN OF CARE
Problem: Physical Therapy Goal  Goal: Physical Therapy Goal  Goals to be met by: 11/15/2017    Patient will increase functional independence with mobility by performin. Supine to sit with Modified [place]  2. Sit to supine with Modified [place]  3. Sit to stand transfer with Contact Guard Assistance  4. Bed to chair transfer with Minimal Assistance using Rolling Walker  5. Gait  x 25 feet with Minimal Assistance using Rolling Walker.     Outcome: Ongoing (interventions implemented as appropriate)  Evaluation complete. Goals appropriate to improve functional mobility.    Black Blanchard III, XIANGT, PT  2017

## 2017-11-05 NOTE — PROGRESS NOTES
[facility] Hospital Medicine  Progress Note    Patient Name: Tona Dey  MRN: [id number]  Patient Class: IP- Inpatient   Admission Date: 11/4/2017  Length of Stay: 1 days  Attending Physician: Kwaku Jay MD  Primary Care Provider: Primary Doctor [facility] Medicine Team: [facility] HOSP MED 3 Navi Espinoza MD    Subjective:     Principal Problem:LOC (loss of consciousness)    HPI:  Tona Dey is a 86 y.o. M with parkinson's disease, CKD stage 2, DVT (on coumadin, follows with coumadin clinic), autonomic instability w/ orthostatis who p/t ED from home via EMS after unwitnessed syncopal episode while using bathroom earlier today. Patient wife was at home at the time though she did not accompany patient to the Ed. Patient states that he does not remember the event and does not believe that he hit his head. Patient was recently discharged from [facility] (10/27-10/29) after a similar presentation, CT negative, UA normal during that admission. Thought to be delirium from polypharmacy, received haldol in ED on DOA, was AAOx3 HD#2, was D/c to home on HD#3 with PT/OT and neuro F/u. He denies CP/SOB/N/V/F/C.  ROS positive for chronic arthritic pain in his back and knee and urinary urgency.     Hospital Course:  86 y.o. M with parkinson's disease, CKD stage 2, DVT (on coumadin, follows with coumadin clinic), autonomic instability w/ orthostatis who p/t ED from home via EMS after unwitnessed syncopal episode while using bathroom earlier today. This has been a frequent occurance for this patient. The risks and benefits of his medications were discussed with length with the patient. Specifically regarding the use of fludrocortisone and midodrine and their affect on his BP given that he swings between hypotensive episodes when erect and symptomatic hypertension when supine. The use of coumadin in the setting of recent DVT and also recent falls was discussed as well. Mack will remain in the hospital for  one more night as medically appropriate and be discharged to home in the morning. DNR status and the option to consider home hospice for to be discussed with patient and wife. He was educated on the importance of plenty of salt and fluid intake to support his BP as well as the use of compression stockings. His wife is very fluent in the appropriate usage of his medications and admits that she is having a hard time managing the patients frequent syncopal episodes resulting in hospital admissions at this point. He will need close f/u with PCP s/p discharge. To be sent home Monday AM.     Interval History: POLO. Mr. Pineda was seen this Am, wife was at bedside later on in the [facility] as well. The risks and benefits of his medications were discussed with length with the patient. Specifically regarding the use of fludrocortisone and midodrine and their affect on his BP given that he swings between hypotensive episodes when erect and symptomatic hypertension when supine. The use of coumadin in the setting of recent DVT and also recent falls was discussed as well. Mack will remain in the hospital for one more night as medically appropriate and be discharged to home in the morning. DNR status and the option to consider home hospice for to be discussed with patient and wife. He was educated on the importance of plenty of salt and fluid intake to support his BP as well as the use of compression stockings. His wife is very fluent in the appropriate usage of his medications and admits that she is having a hard time managing the patients frequent syncopal episodes resulting in hospital admissions at this point. He will need close f/u with PCP s/p discharge.     Review of Systems   Constitutional: Negative for activity change, appetite change and chills.   HENT: Negative for congestion and facial swelling.    Eyes: Negative for discharge and itching.   Respiratory: Negative for apnea, chest tightness and shortness of breath.     Cardiovascular: Negative for chest pain, palpitations and leg swelling.   Gastrointestinal: Negative for abdominal distention, anal bleeding, blood in stool and constipation.   Endocrine: Negative for cold intolerance and heat intolerance.   Genitourinary: Positive for urgency. Negative for difficulty urinating and dysuria.   Musculoskeletal: Positive for arthralgias and back pain.   Skin: Negative for color change.   Allergic/Immunologic: Negative for environmental allergies and food allergies.   Neurological: Negative for dizziness, facial asymmetry and headaches.   Hematological: Negative for adenopathy. Does not bruise/bleed easily.   Psychiatric/Behavioral: Negative for agitation and behavioral problems.     Objective:     Vital Signs (Most Recent):  Temp: 98 °F (36.7 °C) (11/05/17 1150)  Pulse: 87 (11/05/17 1150)  Resp: 18 (11/05/17 1150)  BP: 137/82 (11/05/17 1150)  SpO2: 98 % (11/05/17 1150) Vital Signs (24h Range):  Temp:  [97.6 °F (36.4 °C)-98.1 °F (36.7 °C)] 98 °F (36.7 °C)  Pulse:  [72-87] 87  Resp:  [15-20] 18  SpO2:  [96 %-99 %] 98 %  BP: ()/(47-90) 137/82     Weight: 98.9 kg (218 lb)  Body mass index is 33.15 kg/m².  No intake or output data in the 24 hours ending 11/05/17 1233   Physical Exam   Constitutional: He is oriented to person, place, and time. He appears well-developed and well-nourished.   HENT:   Head: Normocephalic and atraumatic.   Eyes: Conjunctivae and EOM are normal.   Neck: No JVD present. No tracheal deviation present.   Cardiovascular: Normal rate, regular rhythm and normal heart sounds.    Pulmonary/Chest: Effort normal and breath sounds normal.   Abdominal: Soft. Bowel sounds are normal.   Musculoskeletal: He exhibits edema (1+ BLE). He exhibits no deformity.   Neurological: He is alert and oriented to person, place, and time.   Skin: Skin is warm and dry.   Psychiatric: He has a normal mood and affect. His behavior is normal.   Vitals reviewed.      Significant Labs:    Recent Results (from the past 24 hour(s))   Lactic acid, plasma    Collection Time: 11/04/17  9:32 PM   Result Value Ref Range    Lactate (Lactic Acid) 1.3 0.5 - 2.2 mmol/L   CBC auto differential    Collection Time: 11/05/17  3:50 AM   Result Value Ref Range    WBC 4.63 3.90 - 12.70 K/uL    RBC 3.47 (L) 4.60 - 6.20 M/uL    Hemoglobin 10.7 (L) 14.0 - 18.0 g/dL    Hematocrit 34.0 (L) 40.0 - 54.0 %    MCV 98 82 - 98 fL    MCH 30.8 27.0 - 31.0 pg    MCHC 31.5 (L) 32.0 - 36.0 g/dL    RDW 12.5 11.5 - 14.5 %    Platelets 144 (L) 150 - 350 K/uL    MPV 11.0 9.2 - 12.9 fL    Immature Granulocytes 0.4 0.0 - 0.5 %    Gran # 1.8 1.8 - 7.7 K/uL    Immature Grans (Abs) 0.02 0.00 - 0.04 K/uL    Lymph # 2.3 1.0 - 4.8 K/uL    Mono # 0.3 0.3 - 1.0 K/uL    Eos # 0.2 0.0 - 0.5 K/uL    Baso # 0.03 0.00 - 0.20 K/uL    nRBC 0 0 /100 WBC    Gran% 39.4 38.0 - 73.0 %    Lymph% 49.7 (H) 18.0 - 48.0 %    Mono% 6.7 4.0 - 15.0 %    Eosinophil% 3.2 0.0 - 8.0 %    Basophil% 0.6 0.0 - 1.9 %    Differential Method Automated    Comprehensive metabolic panel    Collection Time: 11/05/17  3:50 AM   Result Value Ref Range    Sodium 139 136 - 145 mmol/L    Potassium 4.2 3.5 - 5.1 mmol/L    Chloride 110 95 - 110 mmol/L    CO2 20 (L) 23 - 29 mmol/L    Glucose 86 70 - 110 mg/dL    BUN, Bld 15 8 - 23 mg/dL    Creatinine 0.9 0.5 - 1.4 mg/dL    Calcium 8.9 8.7 - 10.5 mg/dL    Total Protein 6.2 6.0 - 8.4 g/dL    Albumin 2.8 (L) 3.5 - 5.2 g/dL    Total Bilirubin 0.6 0.1 - 1.0 mg/dL    Alkaline Phosphatase 56 55 - 135 U/L    AST 15 10 - 40 U/L    ALT <5 (L) 10 - 44 U/L    Anion Gap 9 8 - 16 mmol/L    eGFR if African American >60.0 >60 mL/min/1.73 m^2    eGFR if non African American >60.0 >60 mL/min/1.73 m^2     Significant Imaging:   CXR 11/04/2017:  Cardiomegaly.    Assessment/Plan:      * LOC (loss of consciousness)    Likely vagal in setting of known orthostatic hypotension  -Episode of LOC during BM on toilet, similar presentation on 10/27  -Has been  hypertensive this admission, holding midodrine with SBP ~ 160  -Afebrile without leukocytosis; CXR shows stable findings. UA with GPR/GPC  -EKG shows NSR. Trop WNL. CT head without acute findings last admission as well  -Recent echo from 10/2/2017 shows pEF with normal diastolic function; will not repeat  -EEG from 10/28: shows mild generalized slowing of overall waking; no evidence of seizures captured and no evidence of focal cerebral dysfunction.      Autonomic instability    -Continue midodrine 5mg TID with parameters and monitoring  -Continue fludrocortisone 100mcg daily   -Fall precautions  -Patient was scheduled for f/u with neurology s/p last discarge      Bacteriuria    86 y.o. M with GPR, GPC in UA, patient not c/o dysuria  -Ucx IP  -Received 1 dose rocephin in the ED, Bactrim 500 BID for 2 days after      Acute deep vein thrombosis (DVT) of popliteal vein of left lower extremity    - BLE US reveals DVT in L popliteal on prior admission   - started coumadin/lovenox bridge last admission, follows with coumadin clinic, INR subtherapeutic at 1.4 on admit, 1.5 today  -Continue current dose and trend INR, pt will need to make scheduled appts in coumadin clinic      Parkinsonism    - Continue home Sinemet  - Outpatient f/u with neurology      CKD (chronic kidney disease) stage 2, GFR 60-89 ml/min    Stable at baseline  - Cr 1.0 on admit  - Avoid nephrotoxic agents  - Will continue to monitor      Lower extremity edema    1+ BLE edema appears to be at baseline  -BNP 31, no echo on file.  CXR unremarkable.  -Will elevate BLE.  -Echo shows pEF without wall motion abnormalities      Mild cognitive impairment    Baseline     VTE Risk Mitigation         Ordered      VTE Prophylaxis: warfarin (COUMADIN) tablet 5 mg  Daily  Route: Oral    Diet: Adult Regular diet  Code Status: Partial Code  Tubes/Lines/Drains: Peripheral IV - Single Lumen 11/04/17 0840 Right Antecubital       11/04/17 1053        Navi Espinoza,  MD  Department of Hospital Medicine   [facility]

## 2017-11-05 NOTE — PLAN OF CARE
Problem: Occupational Therapy Goal  Goal: Occupational Therapy Goal  Goals to be met by: 11/19/17     Patient will increase functional independence with ADLs by performing:    Feeding with Set-up Assistance.  UE Dressing with Set-up Assistance.  LE Dressing with Moderate Assistance.  Grooming while seated with Set-up Assistance.  Toileting from bedside commode with Moderate Assistance for hygiene and clothing management.   Toilet transfer to bedside commode with Minimal Assistance.  Upper extremity exercise program x12 reps per handout, with supervision.  Sit on EOB during functional task for 10 minutes with SBA    Outcome: Ongoing (interventions implemented as appropriate)    Pt was agreeable to OT/PT evaluation.  Goals established to assist pt with returning to PLOF regarding ADLs and func mobility.  Pt will benefit from skilled OT services in order to increase his level of safety and independence with ADLs and mobility.      Karen Hernandez, OT  11/5/2017

## 2017-11-05 NOTE — PT/OT/SLP EVAL
"Occupational Therapy  Evaluation & Tx    Tona Dey   MRN: [id number]   Admitting Diagnosis: LOC (loss of consciousness)    OT Date of Treatment: 11/05/17   OT Start Time: 1338  OT Stop Time: 1402  OT Total Time (min): 24 min    Billable Minutes:  Evaluation 16 - coeval with PT  THerapeutic Activity 8    Diagnosis: LOC (loss of consciousness)       Past Medical History:   Diagnosis Date    Anemia     Chronic back pain     Hypertension     Lumbago     Orthostatic hypotension dysautonomic syndrome     Parkinson disease     Renal disorder       Past Surgical History:   Procedure Laterality Date    BACK SURGERY  1961    ROTATOR CUFF REPAIR         Referring physician: Brent  Date referred to OT: 11/05/17    General Precautions: Standard, fall  Orthopedic Precautions: N/A  Braces: N/A          Patient History:  Living Environment  Lives With: spouse  Living Arrangements: house  Transportation Available: family or friend will provide  Living Environment Comment:  (Pt lives with wife in [facility] with no steps - PLOF pt required A with ADLs and used RW for mobility - pt has had steady decline and hasn't walked in the past few weeks due to progressive weakness - wife is available to assist 24/7 at d/c but unsure of her physical ability to assist him as he states she is "ill")  Equipment Currently Used at Home: cane, straight, walker, rolling, bath bench, wheelchair - pt states he has a hospital bed but it's not in good condition    Prior level of function:   Bed Mobility/Transfers: needs device and assist  Grooming: needs assist  Bathing: needs assist  Upper Body Dressing: needs assist  Lower Body Dressing: needs assist  Toileting: needs assist  Homemaking Responsibilities: No  Driving License: No  Mode of Transportation: Family  Occupation: Retired     Dominant hand: right    Subjective:  Communicated with nurse prior to session.  "I can put on my sock when I'm at home.  I use a chair and put my leg up.  It just takes " "me a little while."  Chief Complaint: difficulty managing BP  Patient/Family stated goals: regain strength    Pain/Comfort  Pain Rating 1: 0/10  BP measurements taken during evaluation:  149/75 in supine (before sit); 108/58 sitting EOB (immediately after transition to sit), 129/71 sitting EOB (after 5 minutes)    Objective:  Patient found with: bed alarm, telemetry    Cognitive Exam:  Oriented to: Person, Place, Time and Situation - needed some coaxing to recall event leading to current hospitalization  Follows Commands/attention: Follows one-step commands, Follows two-step commands and takes increased time to complete tasks  Communication: clear/fluent and some slurring noted  Memory:  Some difficulty recalling history  Safety awareness/insight to disability: impaired  Coping skills/emotional control: Appropriate to situation    Visual/perceptual:  Intact    Physical Exam:  Postural examination/scapula alignment: Rounded shoulder and Head forward  Skin integrity: Visible skin intact  Edema: Mild B LEs and R hand    Sensation:   Intact    Upper Extremity Range of Motion:  Right Upper Extremity: WFL  Left Upper Extremity: WFL    Upper Extremity Strength:  Right Upper Extremity: WFL  Left Upper Extremity: WFL   Strength: good    Fine motor coordination:   Pt noted with tremors associated with his dx of parkinson's - difficulty with some FM tasks (holding cup to take medicine) - able to demonstrate gross grasp and tip pinch during eval    Gross motor coordination: difficulty with motor control with presence of tremors     Functional Mobility:  Bed Mobility:  Scooting/Bridging: Minimum Assistance (Min A to scoot L hip forward towards EOB )  Supine to Sit: Contact Guard Assistance (with bed rail)  Sit to Supine: Moderate Assistance (assist to lift L LE and lower trunk)    Transfers:  Sit <> Stand Assistance: Minimum Assistance (pt very rigid - scooting to EOB then standing as unit)  Sit <> Stand Assistive Device: No " Assistive Device (hand held assist of both therapist)    Functional Ambulation: pt able to take ~4-5 side steps to his R with assistance shifting his weight and to remain upright - pt with increasing posterior lean as steps progressed - refer to PT brianna for further information    Activities of Daily Living:  Feeding Level of Assistance: Minimum assistance (hold cup to drink and take medications due to shaking hands)    UE Dressing Level of Assistance: Minimum assistance (hospital gown)    LE Dressing Level of Assistance: Total assistance (socks)    Grooming Position: EOB  Grooming Level of Assistance: Supervision (wash face)     Toileting Level of Assistance: Activity did not occur     Bathing Level of Assistance: Activity did not occur      Balance:   Static Sit: Pt able to maintain with hands on bed  Dynamic Sit: SBA - CGA during dynamic task - R hand on bed rail as needed  Static Stand: POOR: Needs MODERATE assist to maintain  Dynamic stand: POOR: N/A    Therapeutic Activities and Exercises:  · Pt completed ADLs and func mobility activities for tx session as noted above  · Pt required cures with mobility in bed and to stand - pt rigid with movements and performed stand as unit when scooting to EOB - pt requires assist in standing and was noted with progressive posterior lean with steps  · Pt able to assist with UE dressing but performs slowly and purposefully - requires increased time with all tasks  · Whiteboard updated  · Pt educated on role of OT and POC      AM-PAC 6 CLICK ADL  How much help from another person does this patient currently need?  1 = Unable, Total/Dependent Assistance  2 = A lot, Maximum/Moderate Assistance  3 = A little, Minimum/Contact Guard/Supervision  4 = None, Modified Mayes/Independent    Putting on and taking off regular lower body clothing? : 2  Bathing (including washing, rinsing, drying)?: 2  Toileting, which includes using toilet, bedpan, or urinal? : 2  Putting on and  "taking off regular upper body clothing?: 3  Taking care of personal grooming such as brushing teeth?: 3  Eating meals?: 3  Total Score: 15    AM-PAC Raw Score CMS "G-Code Modifier Level of Impairment Assistance   6 % Total / Unable   7 - 9 CM 80 - 100% Maximal Assist   10-14 CL 60 - 80% Moderate Assist   15 - 19 CK 40 - 60% Moderate Assist   20 - 22 CJ 20 - 40% Minimal Assist   23 CI 1-20% SBA / CGA   24 CH 0% Independent/ Mod I       Patient left HOB elevated with call button in reach and bed alarm on    Assessment:  Tona Dey is a 86 y.o. male with a medical diagnosis of LOC (loss of consciousness) and presents with deficits as noted below.  Pt was agreeable to OT/PT evaluation.  Goals established to assist pt with returning to PLOF regarding ADLs and func mobility. Pt has had progressive weakness and decrease in strength, resulting in decline in function with ADLs and func mobility.  Pt lives with his wife who he states is not healthy, therefore, it is unknown if she is able to provide the physical assistance he needs at this time.  Pt will benefit from skilled OT services in order to increase his level of safety and independence with ADLs and mobility.  OT recommends SNF for pt's post acute therapy needs to determine if he may regain some of his PLOF and make a safe transition home.  At this time, no DME recommendations are made.     Pt evaluation falls under moderate complexity for evaluation coding due to identification of 3-5 performance deficits noted as stated above. Eval required Min/Mod assistance to complete on this date and detailed assessment(s) were utilized. Moreover, an expanded review of history and occupational profile obtained with additional review of cognitive, physical and psychosocial hx.       Rehab identified problem list/impairments: Rehab identified problem list/impairments: weakness, impaired endurance, impaired self care skills, impaired functional mobilty, gait instability, " impaired balance, impaired cognition, decreased coordination, abnormal tone, decreased safety awareness, decreased lower extremity function, decreased upper extremity function, impaired coordination, impaired fine motor, edema    Rehab potential is fair.    Activity tolerance: Fair    Discharge recommendations: Discharge Facility/Level Of Care Needs: nursing facility, skilled     Barriers to discharge: Barriers to Discharge: Decreased caregiver support    Equipment recommendations: bedside commode     GOALS:    Occupational Therapy Goals        Problem: Occupational Therapy Goal    Goal Priority Disciplines Outcome Interventions   Occupational Therapy Goal     OT, PT/OT Ongoing (interventions implemented as appropriate)    Description:  Goals to be met by: 11/19/17     Patient will increase functional independence with ADLs by performing:    Feeding with Set-up Assistance.  UE Dressing with Set-up Assistance.  LE Dressing with Moderate Assistance.  Grooming while seated with Set-up Assistance.  Toileting from bedside commode with Moderate Assistance for hygiene and clothing management.   Toilet transfer to bedside commode with Minimal Assistance.  Upper extremity exercise program x12 reps per handout, with supervision.  Sit on EOB during functional task for 10 minutes with SBA                      PLAN:  Patient to be seen 4 x/week to address the above listed problems via self-care/home management, therapeutic activities, therapeutic exercises, neuromuscular re-education  Plan of Care expires: 12/05/17  Plan of Care reviewed with: patient         Karen BOYCE Mary, OT  11/05/2017

## 2017-11-06 LAB
ALBUMIN SERPL BCP-MCNC: 2.9 G/DL
ALP SERPL-CCNC: 55 U/L
ALT SERPL W/O P-5'-P-CCNC: <5 U/L
ANION GAP SERPL CALC-SCNC: 6 MMOL/L
AST SERPL-CCNC: 13 U/L
BASOPHILS # BLD AUTO: 0.02 K/UL
BASOPHILS NFR BLD: 0.4 %
BILIRUB SERPL-MCNC: 0.6 MG/DL
BUN SERPL-MCNC: 14 MG/DL
CALCIUM SERPL-MCNC: 9 MG/DL
CHLORIDE SERPL-SCNC: 107 MMOL/L
CO2 SERPL-SCNC: 25 MMOL/L
CREAT SERPL-MCNC: 1.1 MG/DL
DIFFERENTIAL METHOD: ABNORMAL
EOSINOPHIL # BLD AUTO: 0.1 K/UL
EOSINOPHIL NFR BLD: 1.8 %
ERYTHROCYTE [DISTWIDTH] IN BLOOD BY AUTOMATED COUNT: 12.6 %
EST. GFR  (AFRICAN AMERICAN): >60 ML/MIN/1.73 M^2
EST. GFR  (NON AFRICAN AMERICAN): >60 ML/MIN/1.73 M^2
GLUCOSE SERPL-MCNC: 89 MG/DL
HCT VFR BLD AUTO: 33.9 %
HGB BLD-MCNC: 11 G/DL
IMM GRANULOCYTES # BLD AUTO: 0.02 K/UL
IMM GRANULOCYTES NFR BLD AUTO: 0.4 %
INR PPP: 1.8
LYMPHOCYTES # BLD AUTO: 2.2 K/UL
LYMPHOCYTES NFR BLD: 43.9 %
MCH RBC QN AUTO: 31.5 PG
MCHC RBC AUTO-ENTMCNC: 32.4 G/DL
MCV RBC AUTO: 97 FL
MONOCYTES # BLD AUTO: 0.3 K/UL
MONOCYTES NFR BLD: 5.5 %
NEUTROPHILS # BLD AUTO: 2.5 K/UL
NEUTROPHILS NFR BLD: 48 %
NRBC BLD-RTO: 0 /100 WBC
PLATELET # BLD AUTO: 187 K/UL
PMV BLD AUTO: 10.2 FL
POTASSIUM SERPL-SCNC: 4.4 MMOL/L
PROT SERPL-MCNC: 6.5 G/DL
PROTHROMBIN TIME: 17.7 SEC
RBC # BLD AUTO: 3.49 M/UL
SODIUM SERPL-SCNC: 138 MMOL/L
WBC # BLD AUTO: 5.1 K/UL

## 2017-11-06 PROCEDURE — 80053 COMPREHEN METABOLIC PANEL: CPT

## 2017-11-06 PROCEDURE — A4216 STERILE WATER/SALINE, 10 ML: HCPCS | Performed by: STUDENT IN AN ORGANIZED HEALTH CARE EDUCATION/TRAINING PROGRAM

## 2017-11-06 PROCEDURE — 11000001 HC ACUTE MED/SURG PRIVATE ROOM

## 2017-11-06 PROCEDURE — 36415 COLL VENOUS BLD VENIPUNCTURE: CPT

## 2017-11-06 PROCEDURE — 25000003 PHARM REV CODE 250: Performed by: STUDENT IN AN ORGANIZED HEALTH CARE EDUCATION/TRAINING PROGRAM

## 2017-11-06 PROCEDURE — 85610 PROTHROMBIN TIME: CPT

## 2017-11-06 PROCEDURE — 99238 HOSP IP/OBS DSCHRG MGMT 30/<: CPT | Mod: GC,,, | Performed by: HOSPITALIST

## 2017-11-06 PROCEDURE — 85025 COMPLETE CBC W/AUTO DIFF WBC: CPT

## 2017-11-06 PROCEDURE — 25000003 PHARM REV CODE 250: Performed by: INTERNAL MEDICINE

## 2017-11-06 RX ORDER — FLUDROCORTISONE ACETATE 0.1 MG/1
100 TABLET ORAL DAILY
Qty: 30 TABLET | Refills: 0 | Status: SHIPPED | OUTPATIENT
Start: 2017-11-06 | End: 2017-12-06

## 2017-11-06 RX ORDER — WARFARIN 7.5 MG/1
7.5 TABLET ORAL
Qty: 16 TABLET | Refills: 11 | Status: SHIPPED | OUTPATIENT
Start: 2017-11-06 | End: 2018-01-29

## 2017-11-06 RX ORDER — AMOXICILLIN AND CLAVULANATE POTASSIUM 500; 125 MG/1; MG/1
1 TABLET, FILM COATED ORAL 3 TIMES DAILY
Qty: 3 TABLET | Refills: 0 | Status: SHIPPED | OUTPATIENT
Start: 2017-11-06 | End: 2017-11-11

## 2017-11-06 RX ORDER — WARFARIN SODIUM 5 MG/1
5 TABLET ORAL
Status: DISCONTINUED | OUTPATIENT
Start: 2017-11-07 | End: 2017-11-07 | Stop reason: HOSPADM

## 2017-11-06 RX ORDER — WARFARIN SODIUM 5 MG/1
5 TABLET ORAL
Qty: 12 TABLET | Refills: 11 | Status: SHIPPED | OUTPATIENT
Start: 2017-11-07 | End: 2018-01-29

## 2017-11-06 RX ORDER — AMOXICILLIN AND CLAVULANATE POTASSIUM 500; 125 MG/1; MG/1
1 TABLET, FILM COATED ORAL 3 TIMES DAILY
Qty: 3 TABLET | Refills: 0 | Status: SHIPPED | OUTPATIENT
Start: 2017-11-06 | End: 2017-11-06

## 2017-11-06 RX ORDER — WARFARIN 7.5 MG/1
7.5 TABLET ORAL
Status: DISCONTINUED | OUTPATIENT
Start: 2017-11-06 | End: 2017-11-07 | Stop reason: HOSPADM

## 2017-11-06 RX ADMIN — CARBIDOPA AND LEVODOPA 1 TABLET: 25; 100 TABLET ORAL at 05:11

## 2017-11-06 RX ADMIN — TRAMADOL HYDROCHLORIDE 50 MG: 50 TABLET, FILM COATED ORAL at 11:11

## 2017-11-06 RX ADMIN — Medication 3 ML: at 05:11

## 2017-11-06 RX ADMIN — CARBIDOPA AND LEVODOPA 1 TABLET: 25; 100 TABLET ORAL at 01:11

## 2017-11-06 RX ADMIN — WARFARIN SODIUM 7.5 MG: 7.5 TABLET ORAL at 05:11

## 2017-11-06 RX ADMIN — MIDODRINE HYDROCHLORIDE 5 MG: 5 TABLET ORAL at 05:11

## 2017-11-06 RX ADMIN — MIDODRINE HYDROCHLORIDE 5 MG: 5 TABLET ORAL at 09:11

## 2017-11-06 RX ADMIN — Medication 3 ML: at 01:11

## 2017-11-06 RX ADMIN — POLYETHYLENE GLYCOL 3350 17 G: 17 POWDER, FOR SOLUTION ORAL at 10:11

## 2017-11-06 RX ADMIN — Medication 3 ML: at 09:11

## 2017-11-06 RX ADMIN — AMOXICILLIN AND CLAVULANATE POTASSIUM 500 MG: 500; 125 TABLET, FILM COATED ORAL at 09:11

## 2017-11-06 RX ADMIN — POLYETHYLENE GLYCOL 3350 17 G: 17 POWDER, FOR SOLUTION ORAL at 08:11

## 2017-11-06 RX ADMIN — AMOXICILLIN AND CLAVULANATE POTASSIUM 500 MG: 500; 125 TABLET, FILM COATED ORAL at 01:11

## 2017-11-06 RX ADMIN — FLUDROCORTISONE ACETATE 100 MCG: 0.1 TABLET ORAL at 10:11

## 2017-11-06 RX ADMIN — CARBIDOPA AND LEVODOPA 1 TABLET: 25; 100 TABLET ORAL at 09:11

## 2017-11-06 RX ADMIN — AMOXICILLIN AND CLAVULANATE POTASSIUM 500 MG: 500; 125 TABLET, FILM COATED ORAL at 05:11

## 2017-11-06 NOTE — DISCHARGE SUMMARY
[facility] Hospital Medicine  Discharge Summary      Patient Name: Tona Dey  MRN: [id number]  Admission Date: 11/4/2017  Hospital Length of Stay: 2 days  Discharge Date and Time:  11/06/2017 9:12 AM  Attending Physician: Kwaku Jay MD   Discharging Provider: Dinesh Guzmán MD  Primary Care Provider: Primary Doctor [facility] Medicine Team: [facility] HOSP MED 3 Dinesh Guzmán MD    HPI:   Tona Dey is a 86 y.o. M with parkinson's disease, CKD stage 2, DVT (on coumadin, follows with coumadin clinic), autonomic instability w/ orthostatis who p/t ED from home via EMS after unwitnessed syncopal episode while using bathroom earlier today. Patient wife was at home at the time though she did not accompany patient to the Ed. Patient states that he does not remember the event and does not believe that he hit his head. Patient was recently discharged from [facility] (10/27-10/29) after a similar presentation, CT negative, UA normal during that admission. Thought to be delirium from polypharmacy, received haldol in ED on DOA, was AAOx3 HD#2, was D/c to home on HD#3 with PT/OT and neuro F/u. He denies CP/SOB/N/V/F/C.  ROS positive for chronic arthritic pain in his back and knee and urinary urgency.     * No surgery found *      Hospital Course:   86 y.o. M with parkinson's disease, CKD stage 2, DVT (on coumadin, follows with coumadin clinic), autonomic instability w/ orthostatis who p/t ED from home via EMS after unwitnessed syncopal episode while using bathroom earlier today. This has been a frequent occurance for this patient. The risks and benefits of his medications were discussed with length with the patient. Specifically regarding the use of fludrocortisone and midodrine and their affect on his BP given that he swings between hypotensive episodes when erect and symptomatic hypertension when supine. The use of coumadin in the setting of recent DVT and also recent falls was discussed as  well. Mack will remain in the hospital for one more night as medically appropriate and be discharged to home in the morning. DNR status and the option to consider home hospice for to be discussed with patient and wife. He was educated on the importance of plenty of salt and fluid intake to support his BP as well as the use of compression stockings. His wife is very fluent in the appropriate usage of his medications and admits that she is having a hard time managing the patients frequent syncopal episodes resulting in hospital admissions at this point. He will need close f/u with PCP s/p discharge.     Discharge Review of Systems    Constitutional: Negative for activity change, appetite change and chills.   HENT: Negative for congestion and facial swelling.    Eyes: Negative for discharge and itching.   Respiratory: Negative for apnea, chest tightness and shortness of breath.    Cardiovascular: Negative for chest pain, palpitations and leg swelling.   Gastrointestinal: Negative for abdominal distention, anal bleeding, blood in stool and constipation.   Endocrine: Negative for cold intolerance and heat intolerance.   Genitourinary:  Negative for difficulty urinating and dysuria.   Musculoskeletal: Positive for arthralgias and back pain.   Skin: Negative for color change.   Allergic/Immunologic: Negative for environmental allergies and food allergies.   Neurological: Negative for dizziness, facial asymmetry and headaches.   Hematological: Negative for adenopathy. Does not bruise/bleed easily.   Psychiatric/Behavioral: Negative for agitation and behavioral problems.     Discharge Physical Exam:  Constitutional: He is oriented to person, place, and time. He appears well-developed and well-nourished.   HENT:   Head: Normocephalic and atraumatic.   Eyes: Conjunctivae and EOM are normal.   Neck: No JVD present. No tracheal deviation present.   Cardiovascular: Normal rate, regular rhythm and normal heart sounds.     Pulmonary/Chest: Effort normal and breath sounds normal.   Abdominal: Soft. Bowel sounds are normal.   Musculoskeletal: He exhibits Trace Edema bilateral lower extremities He exhibits no deformity.   Neurological: He is alert and oriented to person, place, and time.   Skin: Skin is warm and dry.   Psychiatric: He has a normal mood and affect. His behavior is normal.   Vitals reviewed.      Consults:     * LOC (loss of consciousness)    Likely vagal in setting of known orthostatic hypotension  -Episode of LOC during BM on toilet, similar presentation on 10/27  -Has been intermittently hypertensive this admission, holding midodrine with SBP ~ 160  -Afebrile without leukocytosis; CXR shows stable findings. UA positive for Aerococcus.  -EKG shows NSR. Trop WNL. CT head without acute findings last admission as well  -Recent echo from 10/2/2017 shows pEF with normal diastolic function; will not repeat  -EEG from 10/28: shows mild generalized slowing of overall waking; no evidence of seizures captured and no evidence of focal cerebral dysfunction.            Bacteriuria    86 y.o. M with GPR, GPC in UA, patient not c/o dysuria  -Ucx positive for Aerococcus.    -Received 1 dose rocephin in the Ed, and started on Amoxicillin, will treat for 7 days total  -Discharge with 5 days of antibiotics            Mild cognitive impairment    Baseline          Parkinsonism    - Continue home Sinemet  - Outpatient f/u with neurology          Acute deep vein thrombosis (DVT) of popliteal vein of left lower extremity    - BLE US reveals DVT in L popliteal on prior admission   -Will continue home warfarin, follow with coumadin clinic                Lower extremity edema    1+ BLE edema appears to be at baseline  -BNP 31, no echo on file.  CXR unremarkable.  -Will elevate BLE.  -Echo shows pEF without wall motion abnormalities                Autonomic instability    -Continue home midodrine and Fludrocortisone  -Home health PT/OT                 Final Active Diagnoses:    Diagnosis Date Noted POA    PRINCIPAL PROBLEM:  LOC (loss of consciousness) [R40.20] 10/27/2017 Yes    Bacteriuria [R82.71] 11/04/2017 Yes    Mild cognitive impairment [G31.84] 10/28/2017 Yes    Parkinsonism [G20] 10/07/2017 Yes    Acute deep vein thrombosis (DVT) of popliteal vein of left lower extremity [I82.432] 10/05/2017 Yes    Lower extremity edema [R60.0] 10/02/2017 Yes    Autonomic instability [G90.9] 09/01/2017 Yes     Chronic    CKD (chronic kidney disease) stage 2, GFR 60-89 ml/min [N18.2] 07/22/2016 Yes     Chronic      Problems Resolved During this Admission:    Diagnosis Date Noted Date Resolved POA       Discharged Condition: stable    Disposition:     Follow Up:  Follow-up Information     [facility] - Neurology In 1 week.    Specialty:  Neurology  Contact information:  [address]  [facility] [address] [phone]  Additional information:  7th Floor - [facility] [place]           Primary Doctor No.               Patient Instructions:   No discharge procedures on file.    Significant Diagnostic Studies: Labs:   CMP   Recent Labs  Lab 11/05/17  0350      K 4.2      CO2 20*   GLU 86   BUN 15   CREATININE 0.9   CALCIUM 8.9   PROT 6.2   ALBUMIN 2.8*   BILITOT 0.6   ALKPHOS 56   AST 15   ALT <5*   ANIONGAP 9   ESTGFRAFRICA >60.0   EGFRNONAA >60.0    and CBC   Recent Labs  Lab 11/05/17  0350   WBC 4.63   HGB 10.7*   HCT 34.0*   *       Pending Diagnostic Studies:     Procedure Component Value Units Date/Time    CBC auto differential [[id number]] Collected:  11/06/17 0907    Order Status:  Sent Lab Status:  In process Updated:  11/06/17 0907    Specimen:  Blood from Blood     Narrative:       Collection has been rescheduled by LATRICE at 11/6/2017 04:53 Reason:   Patient refused    Comprehensive metabolic panel [[id number]] Collected:  11/06/17 0907    Order Status:  Sent Lab Status:  In process Updated:  11/06/17 0907    Specimen:  Blood  from Blood     Narrative:       Collection has been rescheduled by MERVINB at 11/6/2017 04:53 Reason:   Patient refused    Protime-INR [[id number]] Collected:  11/06/17 0907    Order Status:  Sent Lab Status:  In process Updated:  11/06/17 0907    Specimen:  Blood from Blood     Narrative:       Collection has been rescheduled by MERVINB at 11/6/2017 04:53 Reason:   Patient refused         Medications:  Reconciled Home Medications:   Current Discharge Medication List      START taking these medications    Details   amoxicillin-clavulanate 500-125mg (AUGMENTIN) 500-125 mg Tab Take 1 tablet (500 mg total) by mouth 3 (three) times daily.  Qty: 3 tablet, Refills: 0      fludrocortisone (FLORINEF) 0.1 mg Tab Take 1 tablet (100 mcg total) by mouth once daily.  Qty: 30 tablet, Refills: 0         CONTINUE these medications which have CHANGED    Details   !! warfarin (COUMADIN) 5 MG tablet Take 1 tablet (5 mg total) by mouth every Tues, Thurs, Sat.  Qty: 12 tablet, Refills: 11      !! warfarin (COUMADIN) 7.5 MG tablet Take 1 tablet (7.5 mg total) by mouth every Mon, Wed, Fri, Sun.  Qty: 16 tablet, Refills: 11       !! - Potential duplicate medications found. Please discuss with provider.      CONTINUE these medications which have NOT CHANGED    Details   acetaminophen (TYLENOL) 325 MG tablet Take 2 tablets (650 mg total) by mouth every 4 (four) hours as needed for Pain.  Qty: 30 tablet, Refills: 0      carbidopa-levodopa  mg (SINEMET)  mg per tablet Take 1 tablet by mouth 3 (three) times daily.  Qty: 90 tablet, Refills: 0      magnesium hydroxide 400 mg/5 ml (MILK OF MAGNESIA) 400 mg/5 mL Susp Take 30 mLs by mouth daily as needed (constipation).      midodrine (PROAMATINE) 10 MG tablet Take 0.5 tablets (5 mg total) by mouth 3 (three) times daily.  Qty: 90 tablet, Refills: 0      ranitidine (ZANTAC) 300 MG tablet Take 300 mg by mouth daily as needed for Heartburn.      traMADol (ULTRAM) 50 mg tablet Take 50 mg by mouth  every 12 (twelve) hours as needed for Pain.      comp.stocking,knee,long,medium Misc 1 application by Misc.(Non-Drug; Combo Route) route daily as needed (Leg swelling).  Qty: 10 application, Refills: 0             Indwelling Lines/Drains at time of discharge:   Lines/Drains/Airways          No matching active lines, drains, or airways          Time spent on the discharge of patient: 20 minutes  Patient was seen and examined on the date of discharge and determined to be suitable for discharge.         Dinesh Guzmán MD  Department of Hospital Medicine  [facility]

## 2017-11-06 NOTE — ASSESSMENT & PLAN NOTE
- BLE US reveals DVT in L popliteal on prior admission   -Will continue home warfarin, follow with coumadin clinic
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
## 2017-11-06 NOTE — PLAN OF CARE
BALAJI spoke with patient's wife. She states that she was told yesterday by the doctor that patient was being discharged at 8am and now all her help is gone. States she told the night help to not come by as patient will be staying in the hospital overnight. BALAJI discussed with team. Patient to stay one more night to allow wife to arrange home care. Plan for am discharge, will make transportation arrangements today. Will follow.

## 2017-11-06 NOTE — PLAN OF CARE
[facility]    HOME HEALTH ORDERS  FACE TO FACE ENCOUNTER    Patient Name: Tona Dey  YOB: 1931    PCP: Primary Doctor No   PCP Address: None  PCP Phone Number: None  PCP Fax: None    Encounter Date: 11/06/2017    Admit to Home Health    Diagnoses:  Active Hospital Problems    Diagnosis  POA    *LOC (loss of consciousness) [R40.20]  Yes    Bacteriuria [R82.71]  Yes    Mild cognitive impairment [G31.84]  Yes    Parkinsonism [G20]  Yes    Acute deep vein thrombosis (DVT) of popliteal vein of left lower extremity [I82.432]  Yes    Lower extremity edema [R60.0]  Yes    Autonomic instability [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       Future Appointments  Date Time Provider Department Center   11/28/2017 1:30 PM Mitchell Seth MD [facility] NEURO [facility]     Follow-up Information     [facility] - Neurology In 1 week.    Specialty:  Neurology  Contact information:  [address]  [facility] [address] [phone]  Additional information:  7th Floor - [facility] [place]           Primary Doctor No.                   I have seen and examined this patient face to face today. My clinical findings that support the need for the home health skilled services and home bound status are the following:  Weakness/numbness causing balance and gait disturbance due to autonomic instability making it taxing to leave home.    Allergies:Review of patient's allergies indicates:  No Known Allergies    Diet: regular diet    Activities: activity as tolerated    Nursing:   SN to complete comprehensive assessment including routine vital signs. Instruct on disease process and s/s of complications to report to MD. Review/verify medication list sent home with the patient at time of discharge  and instruct patient/caregiver as  needed. Frequency may be adjusted depending on start of care date.    Notify MD if SBP > 160 or < 90; DBP > 90 or < 50; HR > 120 or < 50; Temp > 101;      CONSULTS:    Physical Therapy to evaluate and treat. Evaluate for home safety and equipment needs; Establish/upgrade home exercise program. Perform / instruct on therapeutic exercises, gait training, transfer training, and Range of Motion.  Occupational Therapy to evaluate and treat. Evaluate home environment for safety and equipment needs. Perform/Instruct on transfers, ADL training, ROM, and therapeutic exercises.  Aide to provide assistance with personal care, ADLs, and vital signs.   Nursing for medication management    MISCELLANEOUS CARE:  N/A    WOUND CARE ORDERS  n/a      Medications: Review discharge medications with patient and family and provide education.     Tona Dey   Home Medication Instructions JOSE:[id number]    Printed on:11/06/17 3482   Medication Information                      acetaminophen (TYLENOL) 325 MG tablet  Take 2 tablets (650 mg total) by mouth every 4 (four) hours as needed for Pain.             amoxicillin-clavulanate 500-125mg (AUGMENTIN) 500-125 mg Tab  Take 1 tablet (500 mg total) by mouth 3 (three) times daily.             carbidopa-levodopa  mg (SINEMET)  mg per tablet  Take 1 tablet by mouth 3 (three) times daily.  End date 11/11/2017           comp.stocking,knee,long,medium Misc  1 application by Misc.(Non-Drug; Combo Route) route daily as needed (Leg swelling).             fludrocortisone (FLORINEF) 0.1 mg Tab  Take 1 tablet (100 mcg total) by mouth once daily.             magnesium hydroxide 400 mg/5 ml (MILK OF MAGNESIA) 400 mg/5 mL Susp  Take 30 mLs by mouth daily as needed (constipation).             midodrine (PROAMATINE) 10 MG tablet  Take 0.5 tablets (5 mg total) by mouth 3 (three) times daily.             ranitidine (ZANTAC) 300 MG tablet  Take 300 mg by mouth daily as needed for Heartburn.              traMADol (ULTRAM) 50 mg tablet  Take 50 mg by mouth every 12 (twelve) hours as needed for Pain.             warfarin (COUMADIN) 5 MG tablet  Take 1 tablet (5 mg total) by mouth every Tues, Thurs, Sat.             warfarin (COUMADIN) 7.5 MG tablet  Take 1 tablet (7.5 mg total) by mouth every Mon, Wed, Fri, Sun.                   I certify that this patient is confined to his home and needs intermittent skilled nursing care, physical therapy and occupational therapy.        Dinesh Guzmán MD   Resident Physician  [facility]  [facility]

## 2017-11-06 NOTE — PLAN OF CARE
Patient will discharge tomorrow in the am and will resume home health care. CM sent face sheet, H&P, PT/OT notes, Discharge summary and orders via RC to PHN. Aimee with PHN also notified of pending discharge. ENDY Ramesh to arrange transportation for am discharge. Tashia CORONEL, unit director notified of pending discharge. Will follow.    Left message for patient's wife updating her as to transport being arranged for 9am tomorrow. Will follow.

## 2017-11-06 NOTE — PROGRESS NOTES
[facility] Hospital Medicine  Progress Note    Patient Name: Tona Dey  MRN: [id number]  Patient Class: IP- Inpatient   Admission Date: 11/4/2017  Length of Stay: 2 days  Attending Physician: Kwaku Jay MD  Primary Care Provider: Primary Doctor [facility] Medicine Team: [facility] 3 Dinesh Guzmán MD    Subjective:     Principal Problem:LOC (loss of consciousness)    HPI:  Tona Dey is a 86 y.o. M with parkinson's disease, CKD stage 2, DVT (on coumadin, follows with coumadin clinic), autonomic instability w/ orthostatis who p/t ED from home via EMS after unwitnessed syncopal episode while using bathroom earlier today. Patient wife was at home at the time though she did not accompany patient to the Ed. Patient states that he does not remember the event and does not believe that he hit his head. Patient was [place] [facility] [place] after a similar presentation, CT negative, UA normal during that admission. Thought to be delirium from polypharmacy, received haldol in ED on DOA, was AAOx3 HD#2, was D/c to home on HD#3 with PT/OT and neuro F/u. He denies CP/SOB/N/V/F/C.  ROS positive for chronic arthritic pain in his back and knee and urinary urgency.     Hospital Course:  86 y.o. M with parkinson's disease, CKD stage 2, DVT (on coumadin, follows with coumadin clinic), autonomic instability w/ orthostatis who p/t ED from home via EMS after unwitnessed syncopal episode while using bathroom earlier today. This has been a frequent occurance for this patient. The risks and benefits of his medications were discussed with length with the patient. Specifically regarding the use of fludrocortisone and midodrine and their affect on his BP given that he swings between hypotensive episodes when erect and symptomatic hypertension when supine. The use of coumadin in the setting of recent DVT and also recent falls was discussed as well. Mack will remain in the hospital  for one more night as medically appropriate and be discharged to home in the morning. DNR status and the option to consider home hospice for to be discussed with patient and wife. He was educated on the importance of plenty of salt and fluid intake to support his BP as well as the use of compression stockings. His wife is very fluent in the appropriate usage of his medications and admits that she is having a hard time managing the patients frequent syncopal episodes resulting in hospital admissions at this point. He will need close f/u with PCP s/p discharge.         Interval History: No acute events overnight.  Continued on fludrocortisone and midodrine.  Blood pressure remains labile.  However, patient does not report any light headedness or syncopal episode.  Miscommunication with wife this afternoon who felt that she was now not able to care for him when he arrives home since the home health nursing was not available this evening.  Will plan for discharge early tomorrow morning.      Review of Systems   Constitutional: Negative for activity change, appetite change and chills.   HENT: Negative for congestion and facial swelling.    Eyes: Negative for discharge and itching.   Respiratory: Negative for apnea, chest tightness and shortness of breath.    Cardiovascular: Negative for chest pain, palpitations and leg swelling.   Gastrointestinal: Negative for abdominal distention, anal bleeding, blood in stool and constipation.   Endocrine: Negative for cold intolerance and heat intolerance.   Genitourinary: Negative for difficulty urinating and dysuria.   Musculoskeletal: Positive for arthralgias and back pain.   Skin: Negative for color change.   Allergic/Immunologic: Negative for environmental allergies and food allergies.   Neurological: Negative for dizziness, facial asymmetry and headaches.   Hematological: Negative for adenopathy. Does not bruise/bleed easily.   Psychiatric/Behavioral: Negative for agitation and  behavioral problems.     Objective:     Vital Signs (Most Recent):  Temp: 98.1 °F (36.7 °C) (11/06/17 1145)  Pulse: 82 (11/06/17 1145)  Resp: 16 (11/06/17 1145)  BP: (!) 162/78 (11/06/17 1145)  SpO2: 98 % (11/06/17 1145) Vital Signs (24h Range):  Temp:  [97.7 °F (36.5 °C)-98.3 °F (36.8 °C)] 98.1 °F (36.7 °C)  Pulse:  [76-89] 82  Resp:  [16-20] 16  SpO2:  [95 %-98 %] 98 %  BP: (108-168)/(66-79) 162/78     Weight: 98.9 kg (218 lb)  Body mass index is 33.15 kg/m².    Intake/Output Summary (Last 24 hours) at 11/06/17 1444  Last data filed at 11/06/17 0900   Gross per 24 hour   Intake              350 ml   Output                0 ml   Net              350 ml      Physical Exam   Constitutional: He is oriented to person, place, and time. He appears well-developed and well-nourished.   HENT:   Head: Normocephalic and atraumatic.   Eyes: Conjunctivae and EOM are normal.   Neck: No JVD present. No tracheal deviation present.   Cardiovascular: Normal rate, regular rhythm and normal heart sounds.    Pulmonary/Chest: Effort normal and breath sounds normal.   Abdominal: Soft. Bowel sounds are normal.   Musculoskeletal: He exhibits edema (1+ BLE). He exhibits no deformity.   Neurological: He is alert and oriented to person, place, and time.   Skin: Skin is warm and dry.   Psychiatric: He has a normal mood and affect. His behavior is normal.   Vitals reviewed.      Significant Labs: All pertinent labs within the past 24 hours have been reviewed.    Significant Imaging: no new imaging    Assessment/Plan:      * LOC (loss of consciousness)    Likely vagal in setting of known orthostatic hypotension  -Episode of LOC during BM on toilet, similar presentation on 10/27  -Has been intermittently hypertensive this admission, holding midodrine with SBP ~ 160  -Afebrile without leukocytosis; CXR shows stable findings. UA positive for Aerococcus.  -EKG shows NSR. Trop WNL. CT head without acute findings last admission as well  -Recent echo  from 10/2/2017 shows pEF with normal diastolic function; will not repeat  -EEG from 10/28: shows mild generalized slowing of overall waking; no evidence of seizures captured and no evidence of focal cerebral dysfunction.            Bacteriuria    86 y.o. M with GPR, GPC in UA, patient not c/o dysuria  -Ucx positive for Aerococcus.    -Received 1 dose rocephin in the Ed, and started on Amoxicillin, will treat for 7 days total  -Discharge with 5 days of antibiotics            Mild cognitive impairment    Baseline          Parkinsonism    - Continue home Sinemet  - Outpatient f/u with neurology          Acute deep vein thrombosis (DVT) of popliteal vein of left lower extremity    - BLE US reveals DVT in L popliteal on prior admission   -Will continue home warfarin, follow with coumadin clinic                Physical deconditioning              Lower extremity edema    1+ BLE edema appears to be at baseline  -BNP 31, no echo on file.  CXR unremarkable.  -Will elevate BLE.  -Echo shows pEF without wall motion abnormalities                Autonomic instability    -Continue home midodrine and Fludrocortisone  -Home health PT/OT              CKD (chronic kidney disease) stage 2, GFR 60-89 ml/min    Stable at baseline  - Cr 1.0 on admit  - Avoid nephrotoxic agents  - Will continue to monitor          BMI 34.0-34.9,adult                VTE Risk Mitigation         Ordered     warfarin (COUMADIN) tablet 5 mg  Every Tues, Thurs, Sat     Route:  Oral        11/06/17 0657     warfarin (COUMADIN) tablet 7.5 mg  Every Mon, Wed, Fri, Sun     Route:  Oral        11/06/17 0657     Medium Risk of VTE  Once      11/04/17 1023     Place sequential compression device  Until discontinued      11/04/17 1023        Dispo:  Discharge home with home health tomorrow.        Dinesh Guzmán MD  Department of Hospital Medicine   [facility]

## 2017-11-06 NOTE — ASSESSMENT & PLAN NOTE
Likely vagal in setting of known orthostatic hypotension  -Episode of LOC during BM on toilet, similar presentation on 10/27  -Has been intermittently hypertensive this admission, holding midodrine with SBP ~ 160  -Afebrile without leukocytosis; CXR shows stable findings. UA positive for Aerococcus.  -EKG shows NSR. Trop WNL. CT head without acute findings last admission as well  -Recent echo from 10/2/2017 shows pEF with normal diastolic function; will not repeat  -EEG from 10/28: shows mild generalized slowing of overall waking; no evidence of seizures captured and no evidence of focal cerebral dysfunction.

## 2017-11-06 NOTE — ASSESSMENT & PLAN NOTE
86 y.o. M with GPR, GPC in UA, patient not c/o dysuria  -Ucx positive for Aerococcus.    -Received 1 dose rocephin in the Ed, and started on Amoxicillin, will treat for 7 days total  -Discharge with 5 days of antibiotics

## 2017-11-06 NOTE — PLAN OF CARE
ENDY spoke to pt's RN, Charlene, regarding transportation for pt to get home.  Charlene stated that pt's wife thought that the pt was discharging around 8am and all of the home health and help, have left the home.  Pt's wife can not accept pt until the help is back tomorrow morning.    ENDY updated CM.    Domitila Hanley LCSW     [phone]  Critical Care (MICU)

## 2017-11-06 NOTE — PLAN OF CARE
Pt is scheduled to be picked up and transported home, on 11/07/2017 at 9am.  ENDY scheduled for a WC van with a WC, to transport pt home, tomorrow morning.     ENDY informed pt's RN.    Domitila Hanley [facility]     [phone]  Critical Care (MICU)

## 2017-11-07 ENCOUNTER — ANTI-COAG VISIT (OUTPATIENT)
Dept: CARDIOLOGY | Facility: CLINIC | Age: 82
End: 2017-11-07

## 2017-11-07 VITALS
TEMPERATURE: 98 F | DIASTOLIC BLOOD PRESSURE: 70 MMHG | OXYGEN SATURATION: 99 % | WEIGHT: 218 LBS | HEIGHT: 68 IN | HEART RATE: 76 BPM | SYSTOLIC BLOOD PRESSURE: 138 MMHG | RESPIRATION RATE: 14 BRPM | BODY MASS INDEX: 33.04 KG/M2

## 2017-11-07 DIAGNOSIS — Z79.01 LONG-TERM (CURRENT) USE OF ANTICOAGULANTS: ICD-10-CM

## 2017-11-07 DIAGNOSIS — I82.432 ACUTE DEEP VEIN THROMBOSIS (DVT) OF POPLITEAL VEIN OF LEFT LOWER EXTREMITY: ICD-10-CM

## 2017-11-07 PROCEDURE — A4216 STERILE WATER/SALINE, 10 ML: HCPCS | Performed by: STUDENT IN AN ORGANIZED HEALTH CARE EDUCATION/TRAINING PROGRAM

## 2017-11-07 PROCEDURE — 25000003 PHARM REV CODE 250: Performed by: INTERNAL MEDICINE

## 2017-11-07 PROCEDURE — 25000003 PHARM REV CODE 250: Performed by: STUDENT IN AN ORGANIZED HEALTH CARE EDUCATION/TRAINING PROGRAM

## 2017-11-07 RX ADMIN — POLYETHYLENE GLYCOL 3350 17 G: 17 POWDER, FOR SOLUTION ORAL at 08:11

## 2017-11-07 RX ADMIN — CARBIDOPA AND LEVODOPA 1 TABLET: 25; 100 TABLET ORAL at 06:11

## 2017-11-07 RX ADMIN — Medication 3 ML: at 06:11

## 2017-11-07 RX ADMIN — FLUDROCORTISONE ACETATE 100 MCG: 0.1 TABLET ORAL at 08:11

## 2017-11-07 RX ADMIN — AMOXICILLIN AND CLAVULANATE POTASSIUM 500 MG: 500; 125 TABLET, FILM COATED ORAL at 06:11

## 2017-11-07 NOTE — PHYSICIAN QUERY
PT Name: Tona Dey  MR #: [id number]     Physician Query Form - Diagnosis Clarification      CDS/: Priscilla Mendoza RN             Contact information: [phone]    This form is a permanent document in the medical record.     Query Date: November 7, 2017    By submitting this query, we are merely seeking further clarification of documentation.  Please utilize your independent clinical judgment when addressing the question(s) below.     The medical record contains the following:      Findings Supporting Clinical Information Location in Medical Record   UTI Possible exacerbation from UTI, denies dysuria but feels ur has been darker and foul smelling, getting abx.    Bacteriuria    GPR, GPC in UA, patient not c/o dysuria   -Ucx positive for Aerococcus.   -Received 1 dose rocephin in the Ed, and started on Amoxicillin, will treat for 7 days total   -Discharge with 5 days of antibiotics     Amoxicillin 500mg TID      Indications of use: Genitourinary tract infection  Ceftriaxone IVPB   11/4 HP          11/6 Hosp med note          11/5- 11/7 MAR  11/4 MAR     Please clarify if the  UTI diagnosis has been:    [x  ] Ruled In    [  ] Ruled Out    [  ] Clinically undetermined    [  ] Other/Clarification of findings (please specify)_______________________________    Please document in your progress notes daily for the duration of treatment, until resolved, and include in your discharge summary.

## 2017-11-07 NOTE — PT/OT/SLP DISCHARGE
Physical Therapy Discharge Summary    Tona Dey  MRN: [id number]   LOC (loss of consciousness)   Patient Discharged from acute Physical Therapy on 2017.  Please refer to prior PT noted date on 2017 for functional status.     Assessment:   Patient appropriate for care in another setting.  GOALS:    Physical Therapy Goals        Problem: Physical Therapy Goal    Goal Priority Disciplines Outcome Goal Variances Interventions   Physical Therapy Goal     PT/OT, PT Ongoing (interventions implemented as appropriate)     Description:  Goals to be met by: 11/15/2017    Patient will increase functional independence with mobility by performin. Supine to sit with Modified [place]  2. Sit to supine with Modified [place]  3. Sit to stand transfer with Contact Guard Assistance  4. Bed to chair transfer with Minimal Assistance using Rolling Walker  5. Gait  x 25 feet with Minimal Assistance using Rolling Walker.                     Reasons for Discontinuation of Therapy Services  Transfer to alternate level of care.      Plan:  Patient Discharged to: PT recommends SNF; however, patient discharged home with HH therapy.    Black Blanchard III, DPT, PT  2017

## 2017-11-07 NOTE — PROGRESS NOTES
The pt was recently admitted from 11/4 through 11/6 for a syncopal episode.  He was discharged with [facility].  Kath from that office let me know that they have been following his INRs and sending them to his PCP - Dr. Marilu Mcginnis (with [facility]).  Therefore, as the pt does not have any [facility] MDs to sign on our orders and we are not monitoring him, I am discharging him from the [facility] at this time.

## 2017-11-07 NOTE — NURSING
AVS reviewed with PT. Pt verbalizes understanding of all medications, appointments, and instructions. No s/s of distress at time of discharge. Mabel's transportation has arrived to take pt in WC van.

## 2017-11-08 ENCOUNTER — PATIENT OUTREACH (OUTPATIENT)
Dept: ADMINISTRATIVE | Facility: CLINIC | Age: 82
End: 2017-11-08

## 2017-11-08 NOTE — PT/OT/SLP DISCHARGE
Occupational Therapy Discharge Summary    Tona Dey  MRN: [id number]   LOC (loss of consciousness)   Patient Discharged from acute Occupational Therapy on 11/06/17.  Please refer to prior OT note dated on 11/05/17 for functional status.     Assessment:   Patient was discharge unexpectedly.  Information required to complete and accurate discharge summary is unknown.  Refer to therapy initial evaluation and last progress note for initial and most recent functional status and goal achievement.  Recommendations made may be found in medical record.  GOALS:    Occupational Therapy Goals        Problem: Occupational Therapy Goal    Goal Priority Disciplines Outcome Interventions   Occupational Therapy Goal     OT, PT/OT Ongoing (interventions implemented as appropriate)    Description:  Goals to be met by: 11/19/17     Patient will increase functional independence with ADLs by performing:    Feeding with Set-up Assistance.  UE Dressing with Set-up Assistance.  LE Dressing with Moderate Assistance.  Grooming while seated with Set-up Assistance.  Toileting from bedside commode with Moderate Assistance for hygiene and clothing management.   Toilet transfer to bedside commode with Minimal Assistance.  Upper extremity exercise program x12 reps per handout, with supervision.  Sit on EOB during functional task for 10 minutes with SBA                    Reasons for Discontinuation of Therapy Services  Transfer to alternate level of care.      Plan:  Patient Discharged to: Home with Home Health Service.

## 2017-12-04 ENCOUNTER — HOSPITAL ENCOUNTER (EMERGENCY)
Facility: HOSPITAL | Age: 82
Discharge: HOME | End: 2017-12-04
Attending: EMERGENCY MEDICINE
Payer: MEDICARE

## 2017-12-04 VITALS
OXYGEN SATURATION: 98 % | BODY MASS INDEX: 33.65 KG/M2 | TEMPERATURE: 99 F | WEIGHT: 222 LBS | HEART RATE: 77 BPM | RESPIRATION RATE: 16 BRPM | SYSTOLIC BLOOD PRESSURE: 143 MMHG | HEIGHT: 68 IN | DIASTOLIC BLOOD PRESSURE: 73 MMHG

## 2017-12-04 DIAGNOSIS — K64.9 HEMORRHOIDS, UNSPECIFIED HEMORRHOID TYPE: Primary | ICD-10-CM

## 2017-12-04 DIAGNOSIS — R19.5 DARK STOOLS: ICD-10-CM

## 2017-12-04 DIAGNOSIS — K92.2 GI BLEED: ICD-10-CM

## 2017-12-04 LAB
ABO + RH BLD: NORMAL
ALBUMIN SERPL BCP-MCNC: 2.8 G/DL
ALP SERPL-CCNC: 75 U/L
ALT SERPL W/O P-5'-P-CCNC: 23 U/L
ANION GAP SERPL CALC-SCNC: 7 MMOL/L
APTT BLDCRRT: 26.3 SEC
AST SERPL-CCNC: 16 U/L
BASOPHILS # BLD AUTO: 0.04 K/UL
BASOPHILS NFR BLD: 1 %
BILIRUB SERPL-MCNC: 0.7 MG/DL
BLD GP AB SCN CELLS X3 SERPL QL: NORMAL
BUN SERPL-MCNC: 11 MG/DL
CALCIUM SERPL-MCNC: 9.5 MG/DL
CHLORIDE SERPL-SCNC: 106 MMOL/L
CO2 SERPL-SCNC: 27 MMOL/L
CREAT SERPL-MCNC: 0.9 MG/DL
DIFFERENTIAL METHOD: ABNORMAL
EOSINOPHIL # BLD AUTO: 0.1 K/UL
EOSINOPHIL NFR BLD: 1.7 %
ERYTHROCYTE [DISTWIDTH] IN BLOOD BY AUTOMATED COUNT: 12.3 %
EST. GFR  (AFRICAN AMERICAN): >60 ML/MIN/1.73 M^2
EST. GFR  (NON AFRICAN AMERICAN): >60 ML/MIN/1.73 M^2
GLUCOSE SERPL-MCNC: 99 MG/DL
HCT VFR BLD AUTO: 36.2 %
HGB BLD-MCNC: 11.6 G/DL
IMM GRANULOCYTES # BLD AUTO: 0.04 K/UL
IMM GRANULOCYTES NFR BLD AUTO: 1 %
INR PPP: 1
LACTATE SERPL-SCNC: 1.7 MMOL/L
LYMPHOCYTES # BLD AUTO: 1.6 K/UL
LYMPHOCYTES NFR BLD: 39.2 %
MCH RBC QN AUTO: 30.9 PG
MCHC RBC AUTO-ENTMCNC: 32 G/DL
MCV RBC AUTO: 96 FL
MONOCYTES # BLD AUTO: 0.2 K/UL
MONOCYTES NFR BLD: 5.6 %
NEUTROPHILS # BLD AUTO: 2.1 K/UL
NEUTROPHILS NFR BLD: 51.5 %
NRBC BLD-RTO: 0 /100 WBC
PLATELET # BLD AUTO: 242 K/UL
PMV BLD AUTO: 10.1 FL
POTASSIUM SERPL-SCNC: 4.2 MMOL/L
PROT SERPL-MCNC: 7.3 G/DL
PROTHROMBIN TIME: 11.1 SEC
RBC # BLD AUTO: 3.76 M/UL
SODIUM SERPL-SCNC: 140 MMOL/L
WBC # BLD AUTO: 4.13 K/UL

## 2017-12-04 PROCEDURE — 85610 PROTHROMBIN TIME: CPT

## 2017-12-04 PROCEDURE — 85025 COMPLETE CBC W/AUTO DIFF WBC: CPT

## 2017-12-04 PROCEDURE — 86901 BLOOD TYPING SEROLOGIC RH(D): CPT

## 2017-12-04 PROCEDURE — 93005 ELECTROCARDIOGRAM TRACING: CPT

## 2017-12-04 PROCEDURE — 99284 EMERGENCY DEPT VISIT MOD MDM: CPT | Mod: ,,, | Performed by: EMERGENCY MEDICINE

## 2017-12-04 PROCEDURE — 99285 EMERGENCY DEPT VISIT HI MDM: CPT | Mod: 25

## 2017-12-04 PROCEDURE — 86900 BLOOD TYPING SEROLOGIC ABO: CPT

## 2017-12-04 PROCEDURE — 83605 ASSAY OF LACTIC ACID: CPT

## 2017-12-04 PROCEDURE — 80053 COMPREHEN METABOLIC PANEL: CPT

## 2017-12-04 PROCEDURE — 85730 THROMBOPLASTIN TIME PARTIAL: CPT

## 2017-12-04 NOTE — ED NOTES
Continue to wait on auth process.  Offered pt another sandwich.  Spoke with his wife earlier to inform her he would probably be here for a while before we are able to get transported and I did not want her to worry

## 2017-12-04 NOTE — ED NOTES
Reached out to  again via our  to help us with the next step in pre authorization.  Faxed [facility] to [phone]

## 2017-12-04 NOTE — ED NOTES
Spoke to [facility] nurse.  valeria will call back with authorization for transport home

## 2017-12-04 NOTE — ED NOTES
Pt is unable to walk for last several weeks.  [facility] is asking for an insurance auth before they will schedule a

## 2017-12-04 NOTE — PLAN OF CARE
ENDY attempted to set up transport. ENDY called [facility] and Sil ABRAHAM RN. She was already going through the auth process so she said she would finish this up.    Elida Callaway, [facility]  [phone]

## 2017-12-04 NOTE — ED NOTES
Bed: [facility] 01  Expected date:   Expected time:   Means of arrival:   Comments:

## 2017-12-04 NOTE — ED NOTES
Left voicemail for wife stating we are having difficulty getting an auth as we do not normally do that.  Charge nurse states a letter stating medical necessity so due however [place] state it will not

## 2017-12-04 NOTE — ED PROVIDER NOTES
"Encounter Date: 12/4/2017       History     Chief Complaint   Patient presents with    Rectal Bleeding     Black stools since Friday.      Pt is a 87 yo M w/ PMHx of CKD, HTN, DVT (on coumadin) Parkinson who presents to the ED complaining of black stools X 3 days and rectal pain. Pt states that he has been having pain in his rectum and loose black stools since Friday. Pt states he has also been feeling a "bubbling sensation in his stomach. Per wife pt was having intense abdominal pains after Thanksgiving dinner and gave the pt pepto bismol however she has not given him any since then. Pt endorses a history of hemorrhoids in the past and states that his rectal pain feels similar to the hemorrhoids however his stools were not black but rather blood tinged. Pt states he feels a burning with defecation. Wife has been giving the pt suppositories for the past few days saying that it helped his rectal pain in the past however has not resolved his pain during this event. He denies any weakness, Cp, SOB, Headache, N/V/D, visual disturbances, back pain or abdominal pain currently.       The history is provided by the patient and the spouse. The history is limited by the condition of the patient.        Review of patient's allergies indicates:  No Known Allergies  Past Medical History:   Diagnosis Date    Anemia     Chronic back pain     Hypertension     Lumbago     Orthostatic hypotension dysautonomic syndrome     Parkinson disease     Renal disorder      Past Surgical History:   Procedure Laterality Date    BACK SURGERY  1961    ROTATOR CUFF REPAIR       Family History   Problem Relation Age of Onset    No Known Problems Mother     Cancer Father      Social History   Substance Use Topics    Smoking status: Former Smoker     Quit date: 8/25/1985    Smokeless tobacco: Never Used    Alcohol use 1.2 oz/week     2 Shots of liquor per week     Review of Systems   Constitutional: Negative for fever.   HENT: Negative " for sore throat.    Respiratory: Negative for shortness of breath.    Cardiovascular: Negative for chest pain.   Gastrointestinal: Negative for abdominal distention, abdominal pain and nausea.        Black stools   Genitourinary: Negative for dysuria.   Musculoskeletal: Positive for neck stiffness. Negative for back pain.   Skin: Negative for rash.   Neurological: Negative for weakness.   Hematological: Does not bruise/bleed easily.       Physical Exam     Initial Vitals [12/04/17 1002]   BP Pulse Resp Temp SpO2   120/74 (!) 16 16 98.6 °F (37 °C) 98 %      MAP       89.33         Physical Exam    Nursing note and vitals reviewed.  Constitutional: He appears well-developed and well-nourished. No distress.   HENT:   Head: Normocephalic and atraumatic.   Eyes: EOM are normal. Pupils are equal, round, and reactive to light.   Neck: No JVD present.   ROM in neck restricted 2/2 to parkinsons.   Cardiovascular: Normal rate, regular rhythm and normal heart sounds.   No murmur heard.  Pulmonary/Chest: Breath sounds normal. No stridor. No respiratory distress. He has no wheezes.   Abdominal: Soft. Bowel sounds are normal. He exhibits no distension and no mass. There is no tenderness. There is no rebound.   Genitourinary: Rectal exam shows guaiac negative stool. Guaiac negative stool.   Genitourinary Comments: Guaiac stool was negative, Rectal evaluation was negative for any external hemorrhoids or palpapable internal hermorrhoids. No gross blood visible. Stool dark brown/black. Pt had large BM prior to examination.   Musculoskeletal: He exhibits no edema or tenderness.   ROM restricted/slowed 2/2 to Parkinson. Unchanged from baseline   Neurological: He is alert and oriented to person, place, and time.   Skin: Skin is warm and dry.         ED Course   Procedures  Labs Reviewed   CBC W/ AUTO DIFFERENTIAL - Abnormal; Notable for the following:        Result Value    RBC 3.76 (*)     Hemoglobin 11.6 (*)     Hematocrit 36.2 (*)      Immature Granulocytes 1.0 (*)     Mono # 0.2 (*)     All other components within normal limits   COMPREHENSIVE METABOLIC PANEL - Abnormal; Notable for the following:     Albumin 2.8 (*)     Anion Gap 7 (*)     All other components within normal limits   PROTIME-INR   APTT   LACTIC ACID, PLASMA   URINALYSIS, REFLEX TO URINE CULTURE   OCCULT BLOOD STOOL, CA SCREEN   TYPE & SCREEN          X-Rays:   Independently Interpreted Readings:   Other Readings:  No signs of bowel obstruction       Medical Decision Making:   Initial Assessment:   Pt is a 87 yo M w/ PMHx of CKD, HTN, Parkinson who presents to the ED complaining of black stools X 3 days. Pt was examined by me and occult stool was negative for blood. Pt had a large BM prior to exam and rectal examination was negative for any gross blood, anal fissures, or ext/int hemorrhoids. Will obtain initial labs and reassess. Will continue to monitor pt.  Differential Diagnosis:   Differential Dx includes but not limited to: Upper GI bleed, Peptobismal intake, Lower GI bleed, hemorrhoids, colon cancer     HO1 Update:  Pt received workup here in the ED which was negative for acute GI bleed, Hb was stable at his baseline around 11, and pt is hemodynamically stable. Pt's likely source of rectal burning is secondary to hemorrhoids and his dark stools are likely secondary to use peptobismal use over the holidays. Pt can be discharged home with topical lidocaine, and recommendation of increase fiber in his diet, and use of preparation H PRN. Pt is clinically stable and can be discharged with recs for PCP follow up and return precautions if symptoms worsen. Patient is agreeable to the plan.    Sam Galvez M.D  [employer] EM Resident   PGY-1  1:27PM         Attending Attestation:   Physician Attestation Statement for Resident:  As the supervising MD   Physician Attestation Statement: I have personally seen and examined this patient.   I agree with the above history. -:   As the  supervising MD I agree with the above PE.    As the supervising MD I agree with the above treatment, course, plan, and disposition.  I have reviewed and agree with the residents interpretation of the following: lab data.  I have reviewed the following: old records at this facility.            Attending ED Notes:   Concerns for black stools at home. However, heme negative brown stool on exam today. Known history of hemmoroids and has pain with bowel movements. With no GI bleeding evidence today and a stable H&H will discharge with topical medications for comfort with bowel movements. Follow up with PCP and CRS as needed.           ED Course      Clinical Impression:   Hemorrhoids   Disposition:   Disposition: Discharged  Condition: Stable                        Tere Arnold MD  12/05/17 0717

## 2018-01-29 ENCOUNTER — HOSPITAL ENCOUNTER (INPATIENT)
Facility: HOSPITAL | Age: 83
LOS: 8 days | Discharge: HOME HEALTH | DRG: 539 | End: 2018-02-06
Attending: EMERGENCY MEDICINE | Admitting: EMERGENCY MEDICINE
Payer: MEDICARE

## 2018-01-29 DIAGNOSIS — R05.9 COUGH: ICD-10-CM

## 2018-01-29 DIAGNOSIS — M25.50 ARTHRALGIA, UNSPECIFIED JOINT: ICD-10-CM

## 2018-01-29 DIAGNOSIS — L89.92: ICD-10-CM

## 2018-01-29 DIAGNOSIS — R60.0 LOWER EXTREMITY EDEMA: ICD-10-CM

## 2018-01-29 DIAGNOSIS — I73.9 PAD (PERIPHERAL ARTERY DISEASE): ICD-10-CM

## 2018-01-29 DIAGNOSIS — M86.9 OSTEOMYELITIS, UNSPECIFIED SITE, UNSPECIFIED TYPE: Primary | ICD-10-CM

## 2018-01-29 DIAGNOSIS — M79.89 LEG SWELLING: ICD-10-CM

## 2018-01-29 DIAGNOSIS — G20.C PARKINSONISM: ICD-10-CM

## 2018-01-29 DIAGNOSIS — B99.9 INFECTION: ICD-10-CM

## 2018-01-29 PROBLEM — R40.20 LOC (LOSS OF CONSCIOUSNESS): Status: RESOLVED | Noted: 2017-10-27 | Resolved: 2018-01-29

## 2018-01-29 PROBLEM — R82.71 BACTERIURIA: Status: RESOLVED | Noted: 2017-11-04 | Resolved: 2018-01-29

## 2018-01-29 LAB
ALBUMIN SERPL BCP-MCNC: 2.7 G/DL
ALP SERPL-CCNC: 184 U/L
ALT SERPL W/O P-5'-P-CCNC: 193 U/L
ANION GAP SERPL CALC-SCNC: 7 MMOL/L
APTT BLDCRRT: 22.4 SEC
AST SERPL-CCNC: 65 U/L
BASOPHILS # BLD AUTO: 0.03 K/UL
BASOPHILS NFR BLD: 0.6 %
BILIRUB SERPL-MCNC: 0.8 MG/DL
BUN SERPL-MCNC: 11 MG/DL
CALCIUM SERPL-MCNC: 9.1 MG/DL
CHLORIDE SERPL-SCNC: 104 MMOL/L
CO2 SERPL-SCNC: 27 MMOL/L
CREAT SERPL-MCNC: 0.8 MG/DL
CRP SERPL-MCNC: 14.5 MG/L
DIFFERENTIAL METHOD: ABNORMAL
EOSINOPHIL # BLD AUTO: 0.1 K/UL
EOSINOPHIL NFR BLD: 2.8 %
ERYTHROCYTE [DISTWIDTH] IN BLOOD BY AUTOMATED COUNT: 14 %
ERYTHROCYTE [SEDIMENTATION RATE] IN BLOOD BY WESTERGREN METHOD: 58 MM/HR
EST. GFR  (AFRICAN AMERICAN): >60 ML/MIN/1.73 M^2
EST. GFR  (NON AFRICAN AMERICAN): >60 ML/MIN/1.73 M^2
FERRITIN SERPL-MCNC: 138 NG/ML
FLUAV AG SPEC QL IA: NEGATIVE
FLUBV AG SPEC QL IA: NEGATIVE
FOLATE SERPL-MCNC: 13.5 NG/ML
GLUCOSE SERPL-MCNC: 120 MG/DL
HCT VFR BLD AUTO: 28.5 %
HGB BLD-MCNC: 9.2 G/DL
IMM GRANULOCYTES # BLD AUTO: 0.04 K/UL
IMM GRANULOCYTES NFR BLD AUTO: 0.8 %
INR PPP: 1
IRON SERPL-MCNC: 49 UG/DL
LACTATE SERPL-SCNC: 1.4 MMOL/L
LYMPHOCYTES # BLD AUTO: 1.8 K/UL
LYMPHOCYTES NFR BLD: 36.7 %
MCH RBC QN AUTO: 31.9 PG
MCHC RBC AUTO-ENTMCNC: 32.3 G/DL
MCV RBC AUTO: 99 FL
MONOCYTES # BLD AUTO: 0.4 K/UL
MONOCYTES NFR BLD: 7 %
NEUTROPHILS # BLD AUTO: 2.6 K/UL
NEUTROPHILS NFR BLD: 52.1 %
NRBC BLD-RTO: 0 /100 WBC
PLATELET # BLD AUTO: 225 K/UL
PMV BLD AUTO: 10.1 FL
POCT GLUCOSE: 125 MG/DL (ref 70–110)
POTASSIUM SERPL-SCNC: 4.1 MMOL/L
PROT SERPL-MCNC: 6.7 G/DL
PROTHROMBIN TIME: 10.6 SEC
RBC # BLD AUTO: 2.88 M/UL
SATURATED IRON: 19 %
SODIUM SERPL-SCNC: 138 MMOL/L
SPECIMEN SOURCE: NORMAL
TOTAL IRON BINDING CAPACITY: 255 UG/DL
TRANSFERRIN SERPL-MCNC: 172 MG/DL
VIT B12 SERPL-MCNC: 565 PG/ML
WBC # BLD AUTO: 4.99 K/UL

## 2018-01-29 PROCEDURE — 83605 ASSAY OF LACTIC ACID: CPT

## 2018-01-29 PROCEDURE — 85610 PROTHROMBIN TIME: CPT

## 2018-01-29 PROCEDURE — 87400 INFLUENZA A/B EACH AG IA: CPT | Mod: 59

## 2018-01-29 PROCEDURE — 82962 GLUCOSE BLOOD TEST: CPT

## 2018-01-29 PROCEDURE — 63600175 PHARM REV CODE 636 W HCPCS: Performed by: HOSPITALIST

## 2018-01-29 PROCEDURE — 25000003 PHARM REV CODE 250: Performed by: HOSPITALIST

## 2018-01-29 PROCEDURE — 96361 HYDRATE IV INFUSION ADD-ON: CPT

## 2018-01-29 PROCEDURE — 99223 1ST HOSP IP/OBS HIGH 75: CPT | Mod: ,,, | Performed by: HOSPITALIST

## 2018-01-29 PROCEDURE — 86140 C-REACTIVE PROTEIN: CPT

## 2018-01-29 PROCEDURE — 96376 TX/PRO/DX INJ SAME DRUG ADON: CPT

## 2018-01-29 PROCEDURE — 82746 ASSAY OF FOLIC ACID SERUM: CPT

## 2018-01-29 PROCEDURE — 99284 EMERGENCY DEPT VISIT MOD MDM: CPT | Mod: ,,, | Performed by: EMERGENCY MEDICINE

## 2018-01-29 PROCEDURE — 99285 EMERGENCY DEPT VISIT HI MDM: CPT | Mod: 25

## 2018-01-29 PROCEDURE — 96374 THER/PROPH/DIAG INJ IV PUSH: CPT

## 2018-01-29 PROCEDURE — 82607 VITAMIN B-12: CPT

## 2018-01-29 PROCEDURE — 83540 ASSAY OF IRON: CPT

## 2018-01-29 PROCEDURE — 25000003 PHARM REV CODE 250: Performed by: EMERGENCY MEDICINE

## 2018-01-29 PROCEDURE — 85651 RBC SED RATE NONAUTOMATED: CPT

## 2018-01-29 PROCEDURE — 63600175 PHARM REV CODE 636 W HCPCS: Performed by: STUDENT IN AN ORGANIZED HEALTH CARE EDUCATION/TRAINING PROGRAM

## 2018-01-29 PROCEDURE — 80053 COMPREHEN METABOLIC PANEL: CPT

## 2018-01-29 PROCEDURE — 85025 COMPLETE CBC W/AUTO DIFF WBC: CPT

## 2018-01-29 PROCEDURE — 85730 THROMBOPLASTIN TIME PARTIAL: CPT

## 2018-01-29 PROCEDURE — 82728 ASSAY OF FERRITIN: CPT

## 2018-01-29 PROCEDURE — 12000002 HC ACUTE/MED SURGE SEMI-PRIVATE ROOM

## 2018-01-29 RX ORDER — SODIUM CHLORIDE 0.9 % (FLUSH) 0.9 %
5 SYRINGE (ML) INJECTION
Status: DISCONTINUED | OUTPATIENT
Start: 2018-01-29 | End: 2018-02-06 | Stop reason: HOSPADM

## 2018-01-29 RX ORDER — ACETAMINOPHEN 500 MG
1000 TABLET ORAL
Status: DISCONTINUED | OUTPATIENT
Start: 2018-01-29 | End: 2018-01-29

## 2018-01-29 RX ORDER — TRAMADOL HYDROCHLORIDE 50 MG/1
50 TABLET ORAL
Status: COMPLETED | OUTPATIENT
Start: 2018-01-29 | End: 2018-01-29

## 2018-01-29 RX ORDER — IBUPROFEN 200 MG
24 TABLET ORAL
Status: DISCONTINUED | OUTPATIENT
Start: 2018-01-29 | End: 2018-02-06 | Stop reason: HOSPADM

## 2018-01-29 RX ORDER — CARBIDOPA AND LEVODOPA 25; 100 MG/1; MG/1
1 TABLET ORAL 3 TIMES DAILY
Status: DISCONTINUED | OUTPATIENT
Start: 2018-01-29 | End: 2018-01-30

## 2018-01-29 RX ORDER — IBUPROFEN 200 MG
16 TABLET ORAL
Status: DISCONTINUED | OUTPATIENT
Start: 2018-01-29 | End: 2018-02-06 | Stop reason: HOSPADM

## 2018-01-29 RX ORDER — GLUCAGON 1 MG
1 KIT INJECTION
Status: DISCONTINUED | OUTPATIENT
Start: 2018-01-29 | End: 2018-02-06 | Stop reason: HOSPADM

## 2018-01-29 RX ORDER — ACETAMINOPHEN 325 MG/1
650 TABLET ORAL EVERY 8 HOURS PRN
Status: DISCONTINUED | OUTPATIENT
Start: 2018-01-29 | End: 2018-02-06 | Stop reason: HOSPADM

## 2018-01-29 RX ORDER — KETOROLAC TROMETHAMINE 30 MG/ML
10 INJECTION, SOLUTION INTRAMUSCULAR; INTRAVENOUS
Status: COMPLETED | OUTPATIENT
Start: 2018-01-29 | End: 2018-01-29

## 2018-01-29 RX ORDER — ACETAMINOPHEN 500 MG
1000 TABLET ORAL
Status: COMPLETED | OUTPATIENT
Start: 2018-01-29 | End: 2018-01-29

## 2018-01-29 RX ORDER — MIDODRINE HYDROCHLORIDE 5 MG/1
10 TABLET ORAL 3 TIMES DAILY
Status: DISCONTINUED | OUTPATIENT
Start: 2018-01-29 | End: 2018-01-30

## 2018-01-29 RX ORDER — KETOROLAC TROMETHAMINE 30 MG/ML
15 INJECTION, SOLUTION INTRAMUSCULAR; INTRAVENOUS
Status: COMPLETED | OUTPATIENT
Start: 2018-01-29 | End: 2018-01-29

## 2018-01-29 RX ORDER — ONDANSETRON 4 MG/1
8 TABLET, ORALLY DISINTEGRATING ORAL EVERY 8 HOURS PRN
Status: DISCONTINUED | OUTPATIENT
Start: 2018-01-29 | End: 2018-02-06 | Stop reason: HOSPADM

## 2018-01-29 RX ORDER — OXYCODONE HYDROCHLORIDE 5 MG/1
10 TABLET ORAL EVERY 6 HOURS PRN
Status: DISCONTINUED | OUTPATIENT
Start: 2018-01-29 | End: 2018-01-31

## 2018-01-29 RX ORDER — TRAMADOL HYDROCHLORIDE 50 MG/1
50 TABLET ORAL EVERY 6 HOURS PRN
Status: DISCONTINUED | OUTPATIENT
Start: 2018-01-29 | End: 2018-01-31

## 2018-01-29 RX ORDER — RAMELTEON 8 MG/1
8 TABLET ORAL NIGHTLY PRN
Status: DISCONTINUED | OUTPATIENT
Start: 2018-01-29 | End: 2018-01-31

## 2018-01-29 RX ORDER — SODIUM CHLORIDE 9 MG/ML
INJECTION, SOLUTION INTRAVENOUS CONTINUOUS
Status: ACTIVE | OUTPATIENT
Start: 2018-01-29 | End: 2018-01-30

## 2018-01-29 RX ADMIN — KETOROLAC TROMETHAMINE 15 MG: 30 INJECTION, SOLUTION INTRAMUSCULAR at 09:01

## 2018-01-29 RX ADMIN — CARBIDOPA AND LEVODOPA 1 TABLET: 25; 100 TABLET ORAL at 09:01

## 2018-01-29 RX ADMIN — ACETAMINOPHEN 1000 MG: 500 TABLET ORAL at 01:01

## 2018-01-29 RX ADMIN — KETOROLAC TROMETHAMINE 10 MG: 30 INJECTION, SOLUTION INTRAMUSCULAR at 05:01

## 2018-01-29 RX ADMIN — MIDODRINE HYDROCHLORIDE 10 MG: 5 TABLET ORAL at 09:01

## 2018-01-29 RX ADMIN — TRAMADOL HYDROCHLORIDE 50 MG: 50 TABLET, COATED ORAL at 08:01

## 2018-01-29 RX ADMIN — SODIUM CHLORIDE: 0.9 INJECTION, SOLUTION INTRAVENOUS at 11:01

## 2018-01-29 RX ADMIN — TRAMADOL HYDROCHLORIDE 50 MG: 50 TABLET, FILM COATED ORAL at 02:01

## 2018-01-29 NOTE — ED PROVIDER NOTES
Encounter Date: 1/29/2018       History     Chief Complaint   Patient presents with    Joint Pain     Pt reports knee and shoulder pain x1 week  Additionally, patient has infected left great toe and new onset buttock ulcer     Mr. Dey is an 85 yo M w/ hx of htn, ckd, parkinson's disease, recent lle dvt on coumadin switched to eliquis several weeks ago, presenting to ED w/ multiple complaints - patients family states that he has been having fevers at home to 101.1F, cough, diffuse arthralgias, left > right lower ext swelling since switching to eliquis, and has been having a left 1st digit infection on antibiotics from pcp, not resolving, more pain currently, as well as a new ulcer on his buttock.     Denies shortness of breath, syncope, chest pain, abd pain, nausea, vomiting, diarrhea, dark or red stools, hematuria.           Review of patient's allergies indicates:  No Known Allergies  Past Medical History:   Diagnosis Date    Anemia     Chronic back pain     Hypertension     Lumbago     Orthostatic hypotension dysautonomic syndrome     Parkinson disease     Renal disorder      Past Surgical History:   Procedure Laterality Date    BACK SURGERY  1961    ROTATOR CUFF REPAIR       Family History   Problem Relation Age of Onset    No Known Problems Mother     Cancer Father      Social History   Substance Use Topics    Smoking status: Former Smoker     Quit date: 8/25/1985    Smokeless tobacco: Never Used    Alcohol use 1.2 oz/week     2 Shots of liquor per week     Review of Systems   Constitutional: Positive for chills and fever. Negative for appetite change and diaphoresis.   HENT: Negative for congestion, rhinorrhea and sore throat.    Eyes: Negative for photophobia and visual disturbance.   Respiratory: Positive for cough. Negative for chest tightness, shortness of breath and stridor.    Cardiovascular: Positive for leg swelling. Negative for chest pain and palpitations.   Gastrointestinal:  Negative for abdominal distention, abdominal pain, diarrhea, nausea and vomiting.   Genitourinary: Negative for dysuria, flank pain and hematuria.   Musculoskeletal: Positive for arthralgias and joint swelling. Negative for back pain and neck pain.   Skin: Positive for wound. Negative for rash.   Neurological: Negative for dizziness, syncope, weakness, light-headedness and numbness.   Psychiatric/Behavioral: Negative for agitation and confusion.       Physical Exam     Initial Vitals   BP Pulse Resp Temp SpO2   01/29/18 1217 01/29/18 1217 01/29/18 1217 01/29/18 1218 01/29/18 1217   131/78 85 18 98.2 °F (36.8 °C) 100 %      MAP       01/29/18 1217       95.67         Physical Exam    Nursing note and vitals reviewed.  Constitutional: He appears well-developed. He is not diaphoretic. He is cooperative.  Non-toxic appearance. He has a sickly appearance. He appears ill. He appears distressed (mild distress secondary to pain).       HENT:   Head: Normocephalic and atraumatic.   Mouth/Throat: Oropharynx is clear and moist.   Eyes: EOM are normal. Pupils are equal, round, and reactive to light. No scleral icterus.   Neck: Normal range of motion. No spinous process tenderness and no muscular tenderness present. Normal range of motion present. No neck rigidity.   Cardiovascular: Normal rate, regular rhythm, normal heart sounds and intact distal pulses. Exam reveals no gallop and no friction rub.    No murmur heard.  Pulmonary/Chest: Breath sounds normal. No accessory muscle usage or stridor. No respiratory distress. He has no decreased breath sounds. He has no wheezes. He has no rhonchi. He has no rales.   Abdominal: Soft. He exhibits no distension. There is no tenderness. There is no rigidity and no guarding.   Musculoskeletal: He exhibits edema and tenderness.        Right knee: He exhibits decreased range of motion, swelling and bony tenderness. He exhibits no deformity, no laceration and no erythema. Tenderness found.         Left knee: He exhibits decreased range of motion, swelling and bony tenderness. He exhibits no deformity and no laceration. Tenderness found.        Left ankle: He exhibits swelling. Tenderness.        Feet:    Neurological: He is alert and oriented to person, place, and time. He has normal strength. He displays tremor. No cranial nerve deficit or sensory deficit. GCS eye subscore is 4. GCS verbal subscore is 5. GCS motor subscore is 6.   Some mild intermittent rigidity consistent with parkinson's     Strength and sensation intact bilaterally in upper and lower ext. Cranial nerves intact. Alert and oriented. Speaking in full sentences clearly, without aphasia or dysarthria.    Skin: Skin is warm and dry. No erythema.         ED Course   Procedures  Labs Reviewed   CBC W/ AUTO DIFFERENTIAL - Abnormal; Notable for the following:        Result Value    RBC 2.88 (*)     Hemoglobin 9.2 (*)     Hematocrit 28.5 (*)     MCV 99 (*)     MCH 31.9 (*)     Immature Granulocytes 0.8 (*)     All other components within normal limits   COMPREHENSIVE METABOLIC PANEL - Abnormal; Notable for the following:     Glucose 120 (*)     Albumin 2.7 (*)     Alkaline Phosphatase 184 (*)     AST 65 (*)      (*)     Anion Gap 7 (*)     All other components within normal limits   URINALYSIS, REFLEX TO URINE CULTURE - Abnormal; Notable for the following:     Appearance, UA Hazy (*)     Occult Blood UA 1+ (*)     All other components within normal limits    Narrative:     Preferred Collection Type->Urine, Clean Catch   C-REACTIVE PROTEIN - Abnormal; Notable for the following:     CRP 14.5 (*)     All other components within normal limits    Narrative:     ADD ON CRP PER DR. KRISTINE RUELAS AT  01/29/2018  16:08    SEDIMENTATION RATE, MANUAL - Abnormal; Notable for the following:     Sed Rate 58 (*)     All other components within normal limits    Narrative:     ADD ON CRP PER DR. KRISTINE RUELAS AT  01/29/2018  16:08   ADD ON ESR PER  DR. KRISTINE RUELAS AT  01/29/2018 16:36 (USED CBC)   IRON AND TIBC - Abnormal; Notable for the following:     Transferrin 172 (*)     Saturated Iron 19 (*)     All other components within normal limits    Narrative:     ADD ON CRP PER DR. KRISTINE RUELAS AT  01/29/2018  16:08   ADD ON ESR PER DR. KRISTINE RUELAS AT  01/29/2018 16:36 (USED CBC)  add on ironp and coral as per dr. darell rosado  01/29/2018  20:57    COMPREHENSIVE METABOLIC PANEL - Abnormal; Notable for the following:     CO2 22 (*)     Glucose 117 (*)     Albumin 2.7 (*)     Alkaline Phosphatase 185 (*)     AST 45 (*)     All other components within normal limits   CBC W/ AUTO DIFFERENTIAL - Abnormal; Notable for the following:     RBC 2.86 (*)     Hemoglobin 9.0 (*)     Hematocrit 27.9 (*)     MCH 31.5 (*)     Immature Granulocytes 0.8 (*)     Immature Grans (Abs) 0.05 (*)     All other components within normal limits   URINALYSIS MICROSCOPIC - Abnormal; Notable for the following:     Hyaline Casts, UA 3 (*)     All other components within normal limits    Narrative:     Preferred Collection Type->Urine, Clean Catch   POCT GLUCOSE - Abnormal; Notable for the following:     POCT Glucose 125 (*)     All other components within normal limits   PROTIME-INR   APTT   LACTIC ACID, PLASMA   INFLUENZA A AND B ANTIGEN   C-REACTIVE PROTEIN   SEDIMENTATION RATE, MANUAL   IRON AND TIBC   FERRITIN   FERRITIN    Narrative:     ADD ON CRP PER DR. KRISTINE RUELAS AT  01/29/2018  16:08   ADD ON ESR PER DR. KRISTINE RUELAS AT  01/29/2018 16:36 (USED CBC)  add on ironp and coral as per dr. darell rosado  01/29/2018  20:57    VITAMIN B12   FOLATE   MAGNESIUM   PHOSPHORUS   POCT GLUCOSE MONITORING CONTINUOUS        HO2  87 yo M w/ multiple medical concerns - bilateral lower ext swelling in the setting of recently diagnosed dvt and anticoagulation changes, soft tissue infection failed outpatient antibiotic therapy, diffuse arthralgias worse in lower ext, with fever at  home per patient's family to 101.1F.   Labs/imaging ordered, pending.   Anticipate admission to medicine for multiple medical complaints - including fever in the setting of soft tissue infection failed outpatient management.   Ben Altman MD PGY2  02/01/2018 1:44 PM    HO2  Patient has no leukocytosis on cbc, 4.99, Alk phos elevated at 184, ast/alt elevated, crp elevated at 14.5, ESR 58, lactate negative, foot xray shows erosive change involving the first metatarsophalangeal joint underlying area of infection, pus drainage.  At this time suspect osteomyelitis, admitted to medicine service for workup and bone biopsy, holding antibiotics for procedure at this time. Patient agrees to stay, verbalized understanding.   Ben Altman MD PGY2  02/01/2018 8:20 PM                      Attending Attestation:   Physician Attestation Statement for Resident:  As the supervising MD   Physician Attestation Statement: I have personally seen and examined this patient.   I agree with the above history. -:   As the supervising MD I agree with the above PE.    As the supervising MD I agree with the above treatment, course, plan, and disposition.                    ED Course      Clinical Impression:   The primary encounter diagnosis was Osteomyelitis, unspecified site, unspecified type. Diagnoses of Cough, Infection, Leg swelling, and PAD (peripheral artery disease) were also pertinent to this visit.                           Elida Evans MD  02/01/18 6524

## 2018-01-29 NOTE — ED NOTES
Bed: [facility]  Expected date:   Expected time:   Means of arrival:   Comments:     Phil Michael, HOMER  01/29/18 1741

## 2018-01-29 NOTE — ED TRIAGE NOTES
Pt complains of joint pain to the knees and shoulders x 1 week. Pt also states his left great toe is infected and he has a new ulcer on his buttock.

## 2018-01-30 LAB
ALBUMIN SERPL BCP-MCNC: 2.7 G/DL
ALP SERPL-CCNC: 185 U/L
ALT SERPL W/O P-5'-P-CCNC: 23 U/L
ANION GAP SERPL CALC-SCNC: 8 MMOL/L
AST SERPL-CCNC: 45 U/L
BACTERIA #/AREA URNS AUTO: ABNORMAL /HPF
BASOPHILS # BLD AUTO: 0.02 K/UL
BASOPHILS NFR BLD: 0.3 %
BILIRUB SERPL-MCNC: 0.9 MG/DL
BILIRUB UR QL STRIP: NEGATIVE
BUN SERPL-MCNC: 17 MG/DL
CALCIUM SERPL-MCNC: 8.8 MG/DL
CHLORIDE SERPL-SCNC: 107 MMOL/L
CLARITY UR REFRACT.AUTO: ABNORMAL
CO2 SERPL-SCNC: 22 MMOL/L
COLOR UR AUTO: ABNORMAL
CREAT SERPL-MCNC: 0.8 MG/DL
DIFFERENTIAL METHOD: ABNORMAL
EOSINOPHIL # BLD AUTO: 0.1 K/UL
EOSINOPHIL NFR BLD: 1.4 %
ERYTHROCYTE [DISTWIDTH] IN BLOOD BY AUTOMATED COUNT: 14 %
EST. GFR  (AFRICAN AMERICAN): >60 ML/MIN/1.73 M^2
EST. GFR  (NON AFRICAN AMERICAN): >60 ML/MIN/1.73 M^2
GLUCOSE SERPL-MCNC: 117 MG/DL
GLUCOSE UR QL STRIP: NEGATIVE
HCT VFR BLD AUTO: 27.9 %
HGB BLD-MCNC: 9 G/DL
HGB UR QL STRIP: ABNORMAL
HYALINE CASTS UR QL AUTO: 3 /LPF
IMM GRANULOCYTES # BLD AUTO: 0.05 K/UL
IMM GRANULOCYTES NFR BLD AUTO: 0.8 %
KETONES UR QL STRIP: NEGATIVE
LEUKOCYTE ESTERASE UR QL STRIP: NEGATIVE
LYMPHOCYTES # BLD AUTO: 2.5 K/UL
LYMPHOCYTES NFR BLD: 40.4 %
MAGNESIUM SERPL-MCNC: 1.7 MG/DL
MCH RBC QN AUTO: 31.5 PG
MCHC RBC AUTO-ENTMCNC: 32.3 G/DL
MCV RBC AUTO: 98 FL
MICROSCOPIC COMMENT: ABNORMAL
MONOCYTES # BLD AUTO: 0.4 K/UL
MONOCYTES NFR BLD: 5.6 %
NEUTROPHILS # BLD AUTO: 3.2 K/UL
NEUTROPHILS NFR BLD: 51.5 %
NITRITE UR QL STRIP: NEGATIVE
NRBC BLD-RTO: 0 /100 WBC
PH UR STRIP: 5 [PH] (ref 5–8)
PHOSPHATE SERPL-MCNC: 3.4 MG/DL
PLATELET # BLD AUTO: 234 K/UL
PMV BLD AUTO: 9.7 FL
POTASSIUM SERPL-SCNC: 4.1 MMOL/L
PROT SERPL-MCNC: 6.5 G/DL
PROT UR QL STRIP: NEGATIVE
RBC # BLD AUTO: 2.86 M/UL
RBC #/AREA URNS AUTO: 1 /HPF (ref 0–4)
SODIUM SERPL-SCNC: 137 MMOL/L
SP GR UR STRIP: 1.03 (ref 1–1.03)
SQUAMOUS #/AREA URNS AUTO: 0 /HPF
URN SPEC COLLECT METH UR: ABNORMAL
UROBILINOGEN UR STRIP-ACNC: NEGATIVE EU/DL
WBC # BLD AUTO: 6.27 K/UL
WBC #/AREA URNS AUTO: 4 /HPF (ref 0–5)

## 2018-01-30 PROCEDURE — 84100 ASSAY OF PHOSPHORUS: CPT

## 2018-01-30 PROCEDURE — 99232 SBSQ HOSP IP/OBS MODERATE 35: CPT | Mod: ,,, | Performed by: HOSPITALIST

## 2018-01-30 PROCEDURE — 87070 CULTURE OTHR SPECIMN AEROBIC: CPT

## 2018-01-30 PROCEDURE — 25000003 PHARM REV CODE 250: Performed by: HOSPITALIST

## 2018-01-30 PROCEDURE — 93922 UPR/L XTREMITY ART 2 LEVELS: CPT | Performed by: SURGERY

## 2018-01-30 PROCEDURE — 0HBRXZZ EXCISION OF TOE NAIL, EXTERNAL APPROACH: ICD-10-PCS | Performed by: PODIATRIST

## 2018-01-30 PROCEDURE — 83735 ASSAY OF MAGNESIUM: CPT

## 2018-01-30 PROCEDURE — 99223 1ST HOSP IP/OBS HIGH 75: CPT | Mod: 25,,, | Performed by: PODIATRIST

## 2018-01-30 PROCEDURE — 80053 COMPREHEN METABOLIC PANEL: CPT

## 2018-01-30 PROCEDURE — 87075 CULTR BACTERIA EXCEPT BLOOD: CPT

## 2018-01-30 PROCEDURE — 81001 URINALYSIS AUTO W/SCOPE: CPT

## 2018-01-30 PROCEDURE — 85025 COMPLETE CBC W/AUTO DIFF WBC: CPT

## 2018-01-30 PROCEDURE — 11730 AVULSION NAIL PLATE SIMPLE 1: CPT | Mod: TA,,, | Performed by: PODIATRIST

## 2018-01-30 PROCEDURE — 11000001 HC ACUTE MED/SURG PRIVATE ROOM

## 2018-01-30 RX ORDER — BACLOFEN 10 MG/1
10 TABLET ORAL 3 TIMES DAILY
Status: DISCONTINUED | OUTPATIENT
Start: 2018-01-30 | End: 2018-01-31

## 2018-01-30 RX ORDER — INDOMETHACIN 25 MG/1
25 CAPSULE ORAL
Status: DISCONTINUED | OUTPATIENT
Start: 2018-01-30 | End: 2018-01-31

## 2018-01-30 RX ORDER — CIPROFLOXACIN 250 MG/1
500 TABLET, FILM COATED ORAL EVERY 12 HOURS
Status: DISCONTINUED | OUTPATIENT
Start: 2018-01-30 | End: 2018-01-30

## 2018-01-30 RX ORDER — SODIUM CHLORIDE 9 MG/ML
INJECTION, SOLUTION INTRAVENOUS CONTINUOUS
Status: ACTIVE | OUTPATIENT
Start: 2018-01-30 | End: 2018-01-31

## 2018-01-30 RX ADMIN — INDOMETHACIN 25 MG: 25 CAPSULE ORAL at 02:01

## 2018-01-30 RX ADMIN — TRAMADOL HYDROCHLORIDE 50 MG: 50 TABLET, COATED ORAL at 06:01

## 2018-01-30 RX ADMIN — TRAMADOL HYDROCHLORIDE 50 MG: 50 TABLET, COATED ORAL at 09:01

## 2018-01-30 RX ADMIN — BACLOFEN 10 MG: 10 TABLET ORAL at 09:01

## 2018-01-30 RX ADMIN — SODIUM CHLORIDE: 0.9 INJECTION, SOLUTION INTRAVENOUS at 07:01

## 2018-01-30 RX ADMIN — ACETAMINOPHEN 650 MG: 325 TABLET, FILM COATED ORAL at 03:01

## 2018-01-30 RX ADMIN — CARBIDOPA AND LEVODOPA 1 TABLET: 25; 100 TABLET ORAL at 06:01

## 2018-01-30 RX ADMIN — MIDODRINE HYDROCHLORIDE 10 MG: 5 TABLET ORAL at 06:01

## 2018-01-30 RX ADMIN — BACLOFEN 10 MG: 10 TABLET ORAL at 02:01

## 2018-01-30 RX ADMIN — OXYCODONE HYDROCHLORIDE 10 MG: 5 TABLET ORAL at 11:01

## 2018-01-30 NOTE — SUBJECTIVE & OBJECTIVE
Scheduled Meds:   carbidopa-levodopa  mg  1 tablet Oral TID    midodrine  10 mg Oral TID     Continuous Infusions:   sodium chloride 0.9% Stopped (01/30/18 0743)     PRN Meds:acetaminophen, dextrose 50%, dextrose 50%, glucagon (human recombinant), glucose, glucose, ondansetron, oxyCODONE, ramelteon, sodium chloride 0.9%, traMADol    Review of patient's allergies indicates:  No Known Allergies     Past Medical History:   Diagnosis Date    Anemia     Chronic back pain     Hypertension     Lumbago     Orthostatic hypotension dysautonomic syndrome     Parkinson disease     Renal disorder      Past Surgical History:   Procedure Laterality Date    BACK SURGERY  1961    ROTATOR CUFF REPAIR         Family History     Problem Relation (Age of Onset)    Cancer Father    No Known Problems Mother        Social History Main Topics    Smoking status: Former Smoker     Quit date: 8/25/1985    Smokeless tobacco: Never Used    Alcohol use 1.2 oz/week     2 Shots of liquor per week    Drug use: Unknown    Sexual activity: Yes     Partners: Female     Review of Systems   Constitutional: Positive for activity change.   Respiratory: Negative for shortness of breath.    Cardiovascular: Negative for chest pain and leg swelling.   Gastrointestinal: Negative for nausea and vomiting.   Genitourinary: Negative.    Musculoskeletal: Positive for arthralgias and myalgias.   Skin: Positive for color change.   Neurological: Positive for numbness. Negative for weakness.   Psychiatric/Behavioral: Negative.      Objective:     Vital Signs (Most Recent):  Temp: 97.9 °F (36.6 °C) (01/30/18 0509)  Pulse: 91 (01/30/18 0702)  Resp: 16 (01/30/18 0702)  BP: (!) 119/54 (01/30/18 0701)  SpO2: 99 % (01/30/18 0702) Vital Signs (24h Range):  Temp:  [97.9 °F (36.6 °C)-98.8 °F (37.1 °C)] 97.9 °F (36.6 °C)  Pulse:  [85-96] 91  Resp:  [14-20] 16  SpO2:  [95 %-100 %] 99 %  BP: (101-135)/(54-78) 119/54     Weight: 96.6 kg (213 lb)  Body mass  index is 32.39 kg/m².    Foot Exam    General  Affect: appropriate       Right Foot/Ankle     Inspection and Palpation  Tenderness: none   Swelling: none   Skin Exam: skin intact;     Neurovascular  Dorsalis pedis: 1+  Posterior tibial: 1+  Saphenous nerve sensation: diminished  Tibial nerve sensation: diminished  Superficial peroneal nerve sensation: diminished  Deep peroneal nerve sensation: diminished  Sural nerve sensation: diminished      Left Foot/Ankle      Inspection and Palpation  Tenderness: great toe interphalangeal joint   Swelling: great toe metatarsophalangeal joint   Skin Exam: drainage, skin changes and abnormal color;     Neurovascular  Dorsalis pedis: 1+  Posterior tibial: 1+  Saphenous nerve sensation: diminished  Tibial nerve sensation: diminished  Superficial peroneal nerve sensation: diminished  Deep peroneal nerve sensation: diminished  Sural nerve sensation: diminished    Comments  Sanguinous drainage with mild purulence noted surrounding left hallux nail border. Nail noted to be detached from proximal nail bed by 60%    Left heel decubitus ulceration                      Laboratory:  CBC:   Recent Labs  Lab 01/30/18  0419   WBC 6.27   RBC 2.86*   HGB 9.0*   HCT 27.9*      MCV 98   MCH 31.5*   MCHC 32.3     CMP:   Recent Labs  Lab 01/30/18  0419   *   CALCIUM 8.8   ALBUMIN 2.7*   PROT 6.5      K 4.1   CO2 22*      BUN 17   CREATININE 0.8   ALKPHOS 185*   ALT 23   AST 45*   BILITOT 0.9       Diagnostic Results:  X-Ray:No convincing acute osseous or erosive or destructive process to convincingly suggest infection although there is some erosive change involving the first metatarsophalangeal joint, suspected to be on the basis of degenerative change.  Correlation recommended, 3 phase bone scan to exclude infection as warranted.    Clinical Findings:  Detached nail left hallux.

## 2018-01-30 NOTE — PLAN OF CARE
Extended Emergency Contact Information  Primary Emergency Contact: Alem Dey  Address: [address] [place] [id number] [place]  Home Phone: [phone]  Mobile Phone: [phone]  Relation: Spouse    Primary Doctor No    No future appointments.    Payor: "[employer] MANAGED MEDICARE / Plan: "[employer] CHOICES 65 / Product Type: Medicare Advantage /     No Pharmacies Listed       01/30/18 1863   Discharge Assessment   Assessment Type Discharge Planning Assessment   Assessment information obtained from? Medical Record   Expected Length of Stay (days) 3   Prior to hospitilization cognitive status: Alert/Oriented   Prior to hospitalization functional status: Assistive Equipment   Current cognitive status: Alert/Oriented   Current Functional Status: Assistive Equipment   Lives With spouse   Able to Return to Prior Arrangements yes   Is patient able to care for self after discharge? Yes   Patient's perception of discharge disposition home health   Readmission Within The Last 30 Days no previous admission in last 30 days   Patient currently being followed by outpatient case management? No   Patient currently receives any other outside agency services? Yes   Equipment Currently Used at Home cane, straight;walker, rolling;bath bench;wheelchair   Do you have any problems affording any of your prescribed medications? No   Is the patient taking medications as prescribed? yes   Does the patient have transportation home? Yes   Transportation Available family or friend will provide   Does the patient receive services at the [facility] Clinic? No   Discharge Plan A Home with family;Home Health   Discharge Plan B Skilled Nursing Facility   Patient/Family In Agreement With Plan unable to assess

## 2018-01-30 NOTE — ASSESSMENT & PLAN NOTE
Left hallux nail detached by 60% with mild seropurlent drainage. Nail removed see procedure note below.   Aerobic, anaerobic cultures obtained.   Heel protector boots ordered apply B/L LE.  No acute surgical intervention indicated at this time.  CHELE ordered.     Surgeon:  Jennifer Shaffer DPM   Assistant: Brie Lowery DPM PGY-3  Preop Diagnosis: traumatic onycholysis left hallux, nail detached by 60%  Post op diagnosis: Same  Pathology: None  Procedure. After informed verbal consent obtained, the left hallux toe was prepped using alcohol.  The nail plate was then  from the underlying nail bed. Nail nippers were used to remove the nail in total.  The nail bed was cleansed thoroughly with saline. Nail bed inspected no ulceration noted. No exposed bone noted. . The nail bed was flushed with saline. The toe was dried and betadine applied to nail bed. The patient tolerated the procedure well with no complications.   Anesthesia: none required  Hemostasis: direct pressure.   EBL: minimal  Condition: patient tolerated well and was stable after procedure.   Nursing orders placed for daily triple abx ointment application cover with mepilex border.     Weightbearing Status: WBAT left  Offloading Device: DARCO shoe     Brie Lowery DPM PGY-3  Pager: [phone]

## 2018-01-30 NOTE — H&P
History and Physical  Hospital Medicine       Patient Name: Tona Dey  MRN:  [id number]  [facility] Medicine Team: [facility] MED A Jocelyn Bates MD  Date of Admission:  1/29/2018     Principal Problem:  Osteomyelitis   Primary care Physician: Primary Doctor No      History of Present Illness:     Mr. Tona Dey is a 86 y.o. male with Parkinson's Disease, autonomic instability, recent LLE DVT on Eliquis, and obesity who presents to the ED because of fevers and toe pain. He mentions that for about 2 weeks, he has been having difficulty walking because of toe pain.  He denies any numbness or tingling in his feet.  He does have chronic LE swelling.  He endorses some drainage from the wound at night, but denies any foul smelling odor.  He denies any trauma to the toe.  He got antibiotics from his PCP, but is unsure of which one.  Family reports fevers up to 101.1F at home with a cough.    Patient information was obtained from patient and ER records.     Review of Systems   Constitutional: Negative for chills, fatigue, +fever.   HENT: Negative for sore throat, trouble swallowing.    Eyes: Negative for photophobia, visual disturbance.   Respiratory: Negative for cough, shortness of breath.    Cardiovascular: Negative for chest pain, palpitations, + leg swelling.   Gastrointestinal: Negative for abdominal pain, constipation, diarrhea, nausea, vomiting.   Endocrine: Negative for cold intolerance, heat intolerance.   Genitourinary: Negative for dysuria, frequency.   Musculoskeletal: Negative for arthralgias, myalgias.   Skin: + wound  Neurological: Negative for dizziness, syncope, weakness, light-headedness.   Psychiatric/Behavioral: Negative for confusion, hallucinations, anxiety  All other systems reviewed and are negative.      Past Medical History: Patient has a past medical history of Anemia; Chronic back pain; Hypertension; Lumbago; Orthostatic hypotension dysautonomic syndrome; Parkinson disease; and Renal  disorder.    Past Surgical History: Patient has a past surgical history that includes Back surgery (1961) and Rotator cuff repair.    Social History: Patient reports that he quit smoking about 32 years ago. He has never used smokeless tobacco. He reports that he drinks about 1.2 oz of alcohol per week .    Family History: family history includes Cancer in his father; No Known Problems in his mother.    Medications: Scheduled Meds:   carbidopa-levodopa  mg  1 tablet Oral TID    midodrine  10 mg Oral TID     Continuous Infusions:  PRN Meds:.traMADol    Allergies: Patient has No Known Allergies.    Physical Exam:     Vital Signs (Most Recent):  Temp: 98.8 °F (37.1 °C) (01/29/18 1812)  Pulse: 85 (01/29/18 1812)  Resp: 20 (01/29/18 1812)  BP: (!) 102/58 (01/29/18 1812)  SpO2: 99 % (01/29/18 1812) Vital Signs Range (Last 24H):  Temp:  [98.2 °F (36.8 °C)-98.8 °F (37.1 °C)]   Pulse:  [85-90]   Resp:  [16-20]   BP: (102-135)/(58-78)   SpO2:  [99 %-100 %]    Body mass index is 32.39 kg/m².     Physical Exam:  Constitutional: Oriented to person, place, and time. Appears well-developed and well-nourished.   Head: Normocephalic and atraumatic.   Mouth/Throat: Oropharynx is clear and moist.   Eyes: EOM are normal. Pupils are equal, round, and reactive to light. No scleral icterus.   Neck: Normal range of motion. Neck supple.   Cardiovascular: Normal rate and regular rhythm.  No murmur heard.  Pulmonary/Chest: Effort normal and breath sounds normal. No respiratory distress. No wheezes, rales, or rhonchi  Abdominal: Soft. Bowel sounds are normal.  No distension or tenderness  Musculoskeletal: Normal range of motion of both feet. Pedal edema  Neurological: Alert and oriented to person, place, and time. Sensation intact  Skin: Skin is warm and dry. See wound photo below  Psychiatric: Normal mood and affect. Behavior is normal.           Vitals reviewed.      Recent Labs  Lab 01/29/18  1441   WBC 4.99   HGB 9.2*   HCT 28.5*             Recent Labs  Lab 01/29/18  1441      K 4.1      CO2 27   BUN 11   CREATININE 0.8   *   CALCIUM 9.1       Recent Labs  Lab 01/29/18  1441   ALKPHOS 184*   *   AST 65*   ALBUMIN 2.7*   PROT 6.7   BILITOT 0.8   INR 1.0        Recent Labs  Lab 01/29/18  1445   POCTGLUCOSE 125*         Assessment and Plan:     Mr. Tona Dey is a 86 y.o. male who presented to [facility] on 1/29/2018 with osteomyelitis of the left first toe.    Active Hospital Problems    Diagnosis  POA    *Osteomyelitis [M86.9]  Yes    Mild cognitive impairment [G31.84]  Yes    Long term current use of anticoagulant therapy [Z79.01]  Not Applicable    Parkinsonism [G20]  Yes    Acute deep vein thrombosis (DVT) of popliteal vein of left lower extremity [I82.432]  Yes    Lower extremity edema [R60.0]  Yes    Autonomic instability [G90.9]  Yes     Chronic    Obesity (BMI 30-39.9) [E66.9]  Unknown      Resolved Hospital Problems    Diagnosis Date Resolved POA   No resolved problems to display.     Osteomyelitis of Left First Toe  · XR with concern for erosive changes of the left first metatarsalphalangeal joint  · ESR 58 and CRP 14 without fevers or leukocytosis  · Will hold off on MRI until evaluated by Podiatry.  NPO at midnight in case of intervention and anticoagulation held  · Podiatry consulted, appreciate assistance  · Given this sounds more like chronic osteo, will hold off on antibiotics for now.  · Will need ID consult when cultures have been obtained for further recs and outpatient follow up    Parkinsonism  Mild Cognitive Impairment  · Stable  · Continue Sinemet 25-100mg PO TID    Autonomic Instability  · Stable  · Continue Midodrine 10mg PO TID    Acute DVT of LLE Popliteal Vein  Lower Extremity DVT  · US LE 1/29 without new acute DVT  · Hold home Eliqius for now in case of procedure - need to restart when ok with Podiatry    Obesity  Body mass index is 32.39 kg/m².  · Encourage diet, weight  loss, and exercise    Sacral Wound  · Wound Care consult     Diet:  Adult regular.  NPO at midnight in case of Podiatry intervention  GI PPx:  Not indicated  DVT PPx:  Hold home Eliquis in case of OR procedure  Goals of Care:  Full Code    Disposition:  Pending Podiatry recs.  Will need PT/OT when ok with Podiatry    Jocelyn Bates MD  [facility] Medicine  Cell:  [phone]  Spectra:  [id number]  Pager:  [phone]

## 2018-01-30 NOTE — HPI
has a past medical history of Anemia; Chronic back pain; Hypertension; Lumbago; Orthostatic hypotension dysautonomic syndrome; Parkinson disease; and Renal disorder.  Admitted for osteo left foot. Consulted for ulceration left great toe. Pt. Unsure of how injury occurred to left great toenail. Reports pain upon palpation right great toe nail. Per chart review patient on abx for left great toe infection per PCP. Pt. Not followed by podiatry.

## 2018-01-30 NOTE — CONSULTS
[facility]  Podiatry  Consult Note    Patient Name: Tona Dey  MRN: [id number]  Admission Date: 1/29/2018  Hospital Length of Stay: 1 days  Attending Physician: Davon Barry MD  Primary Care Provider: Primary Doctor No     Inpatient consult to Podiatry  Consult performed by: LAYLA ESPINAL  Consult ordered by: MAYUR WARD  Reason for consult: Osteo of left first toe        Subjective:     History of Present Illness:   has a past medical history of Anemia; Chronic back pain; Hypertension; Lumbago; Orthostatic hypotension dysautonomic syndrome; Parkinson disease; and Renal disorder.  Admitted for osteo left foot. Consulted for ulceration left great toe. Pt. Unsure of how injury occurred to left great toenail. Reports pain upon palpation right great toe nail. Per chart review patient on abx for left great toe infection per PCP. Pt. Not followed by podiatry.     Scheduled Meds:   carbidopa-levodopa  mg  1 tablet Oral TID    midodrine  10 mg Oral TID     Continuous Infusions:   sodium chloride 0.9% Stopped (01/30/18 0743)     PRN Meds:acetaminophen, dextrose 50%, dextrose 50%, glucagon (human recombinant), glucose, glucose, ondansetron, oxyCODONE, ramelteon, sodium chloride 0.9%, traMADol    Review of patient's allergies indicates:  No Known Allergies     Past Medical History:   Diagnosis Date    Anemia     Chronic back pain     Hypertension     Lumbago     Orthostatic hypotension dysautonomic syndrome     Parkinson disease     Renal disorder      Past Surgical History:   Procedure Laterality Date    BACK SURGERY  1961    ROTATOR CUFF REPAIR         Family History     Problem Relation (Age of Onset)    Cancer Father    No Known Problems Mother        Social History Main Topics    Smoking status: Former Smoker     Quit date: 8/25/1985    Smokeless tobacco: Never Used    Alcohol use 1.2 oz/week     2 Shots of liquor per week    Drug use: Unknown    Sexual activity: Yes      Partners: Female     Review of Systems   Constitutional: Positive for activity change.   Respiratory: Negative for shortness of breath.    Cardiovascular: Negative for chest pain and leg swelling.   Gastrointestinal: Negative for nausea and vomiting.   Genitourinary: Negative.    Musculoskeletal: Positive for arthralgias and myalgias.   Skin: Positive for color change.   Neurological: Positive for numbness. Negative for weakness.   Psychiatric/Behavioral: Negative.      Objective:     Vital Signs (Most Recent):  Temp: 97.9 °F (36.6 °C) (01/30/18 0509)  Pulse: 91 (01/30/18 0702)  Resp: 16 (01/30/18 0702)  BP: (!) 119/54 (01/30/18 0701)  SpO2: 99 % (01/30/18 0702) Vital Signs (24h Range):  Temp:  [97.9 °F (36.6 °C)-98.8 °F (37.1 °C)] 97.9 °F (36.6 °C)  Pulse:  [85-96] 91  Resp:  [14-20] 16  SpO2:  [95 %-100 %] 99 %  BP: (101-135)/(54-78) 119/54     Weight: 96.6 kg (213 lb)  Body mass index is 32.39 kg/m².    Foot Exam    General  Affect: appropriate       Right Foot/Ankle     Inspection and Palpation  Tenderness: none   Swelling: none   Skin Exam: skin intact;     Neurovascular  Dorsalis pedis: 1+  Posterior tibial: 1+  Saphenous nerve sensation: diminished  Tibial nerve sensation: diminished  Superficial peroneal nerve sensation: diminished  Deep peroneal nerve sensation: diminished  Sural nerve sensation: diminished      Left Foot/Ankle      Inspection and Palpation  Tenderness: great toe interphalangeal joint   Swelling: great toe metatarsophalangeal joint   Skin Exam: drainage, skin changes and abnormal color;     Neurovascular  Dorsalis pedis: 1+  Posterior tibial: 1+  Saphenous nerve sensation: diminished  Tibial nerve sensation: diminished  Superficial peroneal nerve sensation: diminished  Deep peroneal nerve sensation: diminished  Sural nerve sensation: diminished    Comments  Sanguinous drainage with mild purulence noted surrounding left hallux nail border. Nail noted to be detached from proximal nail bed  by 60%                          Laboratory:  CBC:   Recent Labs  Lab 01/30/18  0419   WBC 6.27   RBC 2.86*   HGB 9.0*   HCT 27.9*      MCV 98   MCH 31.5*   MCHC 32.3     CMP:   Recent Labs  Lab 01/30/18  0419   *   CALCIUM 8.8   ALBUMIN 2.7*   PROT 6.5      K 4.1   CO2 22*      BUN 17   CREATININE 0.8   ALKPHOS 185*   ALT 23   AST 45*   BILITOT 0.9       Diagnostic Results:  X-Ray:No convincing acute osseous or erosive or destructive process to convincingly suggest infection although there is some erosive change involving the first metatarsophalangeal joint, suspected to be on the basis of degenerative change.  Correlation recommended, 3 phase bone scan to exclude infection as warranted.    Clinical Findings:  Detached nail left hallux.     Assessment/Plan:     * Osteomyelitis    Left hallux nail detached by 60% with mild seropurlent drainage. Nail removed see procedure note below.   Aerobic, anaerobic cultures obtained.   Heel protector boots ordered apply B/L LE.  No acute surgical intervention indicated at this time.  CHELE ordered.     Surgeon:  Jennifer Shaffer DPM   Assistant: Brie Lowery DPM PGY-3  Preop Diagnosis: traumatic onycholysis left hallux, nail detached by 60%  Post op diagnosis: Same  Pathology: None  Procedure. After informed verbal consent obtained, the left hallux was prepped using alcohol.  The nail plate was then  from the underlying nail bed. Nail nippers were used to remove the nail in total.  The nail bed was cleansed thoroughly with saline. Nail bed inspected no ulceration noted. No exposed bone noted. . The nail bed was flushed with saline. The toe was dried and betadine applied to nail bed. The patient tolerated the procedure well with no complications.   Anesthesia: none required  Hemostasis: direct pressure.   EBL: minimal  Condition: patient tolerated well and was stable after procedure.   Nursing orders placed for daily triple abx ointment application  cover with mepilex border.     Weightbearing Status: WBAT left  Offloading Device: DARCO shoe     Brie Lowery DPM PGY-3  Pager: [phone]              Long term current use of anticoagulant therapy    Per Primary           Acute deep vein thrombosis (DVT) of popliteal vein of left lower extremity    Per Primary           Lower extremity edema    Per Primary               Thank you for your consult. I will follow-up with patient. Please contact us if you have any additional questions.    Brie Lowery MD  Podiatry  [facility]

## 2018-01-31 PROBLEM — L89.92 PRESSURE INJURY OF SKIN, STAGE 2: Status: ACTIVE | Noted: 2018-01-31

## 2018-01-31 LAB
ALBUMIN SERPL BCP-MCNC: 2.5 G/DL
ALBUMIN SERPL BCP-MCNC: 2.5 G/DL
ALP SERPL-CCNC: 143 U/L
ALP SERPL-CCNC: 145 U/L
ALT SERPL W/O P-5'-P-CCNC: 107 U/L
ALT SERPL W/O P-5'-P-CCNC: 96 U/L
ANION GAP SERPL CALC-SCNC: 6 MMOL/L
ANION GAP SERPL CALC-SCNC: 7 MMOL/L
AST SERPL-CCNC: 56 U/L
AST SERPL-CCNC: 60 U/L
BASOPHILS # BLD AUTO: 0.03 K/UL
BASOPHILS # BLD AUTO: 0.03 K/UL
BASOPHILS NFR BLD: 0.3 %
BASOPHILS NFR BLD: 0.5 %
BILIRUB SERPL-MCNC: 0.7 MG/DL
BILIRUB SERPL-MCNC: 0.9 MG/DL
BUN SERPL-MCNC: 16 MG/DL
BUN SERPL-MCNC: 16 MG/DL
CALCIUM SERPL-MCNC: 8.6 MG/DL
CALCIUM SERPL-MCNC: 8.6 MG/DL
CHLORIDE SERPL-SCNC: 110 MMOL/L
CHLORIDE SERPL-SCNC: 110 MMOL/L
CO2 SERPL-SCNC: 21 MMOL/L
CO2 SERPL-SCNC: 23 MMOL/L
CREAT SERPL-MCNC: 0.7 MG/DL
CREAT SERPL-MCNC: 0.7 MG/DL
DIFFERENTIAL METHOD: ABNORMAL
DIFFERENTIAL METHOD: ABNORMAL
EOSINOPHIL # BLD AUTO: 0.1 K/UL
EOSINOPHIL # BLD AUTO: 0.1 K/UL
EOSINOPHIL NFR BLD: 1.1 %
EOSINOPHIL NFR BLD: 1.8 %
ERYTHROCYTE [DISTWIDTH] IN BLOOD BY AUTOMATED COUNT: 13.8 %
ERYTHROCYTE [DISTWIDTH] IN BLOOD BY AUTOMATED COUNT: 14.1 %
EST. GFR  (AFRICAN AMERICAN): >60 ML/MIN/1.73 M^2
EST. GFR  (AFRICAN AMERICAN): >60 ML/MIN/1.73 M^2
EST. GFR  (NON AFRICAN AMERICAN): >60 ML/MIN/1.73 M^2
EST. GFR  (NON AFRICAN AMERICAN): >60 ML/MIN/1.73 M^2
GLUCOSE SERPL-MCNC: 104 MG/DL
GLUCOSE SERPL-MCNC: 133 MG/DL
HCT VFR BLD AUTO: 28.4 %
HCT VFR BLD AUTO: 28.8 %
HGB BLD-MCNC: 9.2 G/DL
HGB BLD-MCNC: 9.6 G/DL
IMM GRANULOCYTES # BLD AUTO: 0.03 K/UL
IMM GRANULOCYTES # BLD AUTO: 0.13 K/UL
IMM GRANULOCYTES NFR BLD AUTO: 0.5 %
IMM GRANULOCYTES NFR BLD AUTO: 1.2 %
LACTATE SERPL-SCNC: 2.3 MMOL/L
LYMPHOCYTES # BLD AUTO: 2 K/UL
LYMPHOCYTES # BLD AUTO: 2.6 K/UL
LYMPHOCYTES NFR BLD: 24.9 %
LYMPHOCYTES NFR BLD: 32.6 %
MAGNESIUM SERPL-MCNC: 1.8 MG/DL
MCH RBC QN AUTO: 32.2 PG
MCH RBC QN AUTO: 32.5 PG
MCHC RBC AUTO-ENTMCNC: 32.4 G/DL
MCHC RBC AUTO-ENTMCNC: 33.3 G/DL
MCV RBC AUTO: 100 FL
MCV RBC AUTO: 97 FL
MONOCYTES # BLD AUTO: 0.4 K/UL
MONOCYTES # BLD AUTO: 0.8 K/UL
MONOCYTES NFR BLD: 7.1 %
MONOCYTES NFR BLD: 7.9 %
NEUTROPHILS # BLD AUTO: 3.6 K/UL
NEUTROPHILS # BLD AUTO: 6.8 K/UL
NEUTROPHILS NFR BLD: 57.5 %
NEUTROPHILS NFR BLD: 64.6 %
NRBC BLD-RTO: 0 /100 WBC
NRBC BLD-RTO: 0 /100 WBC
PHOSPHATE SERPL-MCNC: 2.7 MG/DL
PLATELET # BLD AUTO: 210 K/UL
PLATELET # BLD AUTO: 218 K/UL
PMV BLD AUTO: 10 FL
PMV BLD AUTO: 10 FL
POCT GLUCOSE: 111 MG/DL (ref 70–110)
POCT GLUCOSE: 112 MG/DL (ref 70–110)
POCT GLUCOSE: 130 MG/DL (ref 70–110)
POTASSIUM SERPL-SCNC: 4.7 MMOL/L
POTASSIUM SERPL-SCNC: 5.2 MMOL/L
PROCALCITONIN SERPL IA-MCNC: 0.04 NG/ML
PROT SERPL-MCNC: 6.2 G/DL
PROT SERPL-MCNC: 6.2 G/DL
RBC # BLD AUTO: 2.83 M/UL
RBC # BLD AUTO: 2.98 M/UL
SODIUM SERPL-SCNC: 138 MMOL/L
SODIUM SERPL-SCNC: 139 MMOL/L
WBC # BLD AUTO: 10.57 K/UL
WBC # BLD AUTO: 6.17 K/UL

## 2018-01-31 PROCEDURE — 80053 COMPREHEN METABOLIC PANEL: CPT | Mod: 91

## 2018-01-31 PROCEDURE — 97162 PT EVAL MOD COMPLEX 30 MIN: CPT

## 2018-01-31 PROCEDURE — 36415 COLL VENOUS BLD VENIPUNCTURE: CPT

## 2018-01-31 PROCEDURE — 63600175 PHARM REV CODE 636 W HCPCS

## 2018-01-31 PROCEDURE — 97165 OT EVAL LOW COMPLEX 30 MIN: CPT

## 2018-01-31 PROCEDURE — 63600175 PHARM REV CODE 636 W HCPCS: Performed by: HOSPITALIST

## 2018-01-31 PROCEDURE — 80053 COMPREHEN METABOLIC PANEL: CPT

## 2018-01-31 PROCEDURE — 84145 PROCALCITONIN (PCT): CPT

## 2018-01-31 PROCEDURE — 99232 SBSQ HOSP IP/OBS MODERATE 35: CPT | Mod: ,,, | Performed by: HOSPITALIST

## 2018-01-31 PROCEDURE — 83605 ASSAY OF LACTIC ACID: CPT

## 2018-01-31 PROCEDURE — 84100 ASSAY OF PHOSPHORUS: CPT

## 2018-01-31 PROCEDURE — 85025 COMPLETE CBC W/AUTO DIFF WBC: CPT

## 2018-01-31 PROCEDURE — 83735 ASSAY OF MAGNESIUM: CPT

## 2018-01-31 PROCEDURE — 25000003 PHARM REV CODE 250: Performed by: HOSPITALIST

## 2018-01-31 PROCEDURE — 11000001 HC ACUTE MED/SURG PRIVATE ROOM

## 2018-01-31 RX ORDER — NALOXONE HCL 0.4 MG/ML
VIAL (ML) INJECTION
Status: COMPLETED
Start: 2018-01-31 | End: 2018-01-31

## 2018-01-31 RX ORDER — SODIUM CHLORIDE 9 MG/ML
INJECTION, SOLUTION INTRAVENOUS CONTINUOUS
Status: ACTIVE | OUTPATIENT
Start: 2018-01-31 | End: 2018-02-01

## 2018-01-31 RX ORDER — NALOXONE HCL 0.4 MG/ML
0.4 VIAL (ML) INJECTION ONCE
Status: COMPLETED | OUTPATIENT
Start: 2018-01-31 | End: 2018-01-31

## 2018-01-31 RX ORDER — CIPROFLOXACIN 2 MG/ML
400 INJECTION, SOLUTION INTRAVENOUS
Status: DISCONTINUED | OUTPATIENT
Start: 2018-01-31 | End: 2018-02-02

## 2018-01-31 RX ADMIN — TRAMADOL HYDROCHLORIDE 50 MG: 50 TABLET, COATED ORAL at 05:01

## 2018-01-31 RX ADMIN — CIPROFLOXACIN 400 MG: 2 INJECTION, SOLUTION INTRAVENOUS at 09:01

## 2018-01-31 RX ADMIN — NALOXONE HYDROCHLORIDE 0.4 MG: 0.4 INJECTION, SOLUTION INTRAMUSCULAR; INTRAVENOUS; SUBCUTANEOUS at 03:01

## 2018-01-31 RX ADMIN — BACLOFEN 10 MG: 10 TABLET ORAL at 05:01

## 2018-01-31 RX ADMIN — Medication 0.4 MG: at 03:01

## 2018-01-31 RX ADMIN — APIXABAN 5 MG: 2.5 TABLET, FILM COATED ORAL at 10:01

## 2018-01-31 RX ADMIN — APIXABAN 5 MG: 2.5 TABLET, FILM COATED ORAL at 09:01

## 2018-01-31 RX ADMIN — SODIUM CHLORIDE: 0.9 INJECTION, SOLUTION INTRAVENOUS at 07:01

## 2018-01-31 NOTE — CONSULTS
Future Appointments  Date Time Provider Department Center   2/6/2018 1:45 PM Jennifer Shaffer DPM [facility] POD Alex Barry requests Pod f/u appt

## 2018-01-31 NOTE — PT/OT/SLP EVAL
Occupational Therapy   Evaluation and Discharge Note    Name: Tona Dey  MRN: [id number]  Admitting Diagnosis:  Osteomyelitis      Recommendations:     Discharge Recommendations: nursing facility, skilled  Discharge Equipment Recommendations:  none  Barriers to discharge:  Decreased caregiver support    History:     Occupational Profile:  Living Environment: Pt lives with wife   Previous level of function:Pt at baseline dependent care  Equipment Owned:  cane, straight, walker, rolling, bath bench, wheelchair  Assistance upon Discharge: Please consider long term needs     Past Medical History:   Diagnosis Date    Anemia     Chronic back pain     Hypertension     Lumbago     Orthostatic hypotension dysautonomic syndrome     Parkinson disease     Renal disorder        Past Surgical History:   Procedure Laterality Date    BACK SURGERY  1961    ROTATOR CUFF REPAIR         Subjective     Chief Complaint: did not verbalize   Communicated with: RN prior to session.  Pain/Comfort:  · Pain Rating 1: 0/10    Patients cultural, spiritual, Caodaism conflicts given the current situation: none stated    Objective:     Patient found with:  (all lines intact)    General Precautions: Standard, fall   Orthopedic Precautions:N/A   Braces: N/A     Occupational Performance:    Bed Mobility:    · Patient completed Rolling/Turning to Right with maximal assistance  · Patient completed Scooting/Bridging with maximal assistance  · Patient completed Supine to Sit with dependent  · Patient completed Sit to Supine with total assistance    Functional Mobility/Transfers:    · Functional Mobility: Pt unable to tolerate standing or mobility    Activities of Daily Living:  · Feeding:  dependence    · Grooming: dependence    · Bathing: dependence    · UB Dressing: dependence    · LB Dressing: dependence    · Toileting: dependence      Cognitive/Visual Perceptual:  Cognitive/Psychosocial Skills:     -       Oriented to: able to state name  "as "[place]",    -       Follows Commands/attention:Follows one-step commands  -       Communication: dysarthria  -       Memory: unable to determine  -       Safety awareness/insight to disability: impaired     Physical Exam:  Postural examination/scapula alignment:    -       Rounded shoulders  -       Forward head  Skin integrity: Visible skin intact  Edema:  Mild B LE  Upper Extremity Range of Motion:     -       Right Upper Extremity: PROM with noted tone  -       Left Upper Extremity: see above    Patient left supine with all lines intact    AMPAC 6 Click:  AMPAC Total Score: 6    Treatment & Education:  Evaluation complete.  Pt at baseline dependent care and without potential for measurable gains.  DC OT  Education:    Assessment:     Tona Dey is a 86 y.o. male with a medical diagnosis of Osteomyelitis. At this time, patient is functioning at their prior level of function and does not require further acute OT services.     Clinical Decision Makin.  OT Low:  "Pt evaluation falls under low complexity for evaluation coding due to performance deficits noted in 1-3 areas as stated above and 0 co-morbities affecting current functional status. Data obtained from problem focused assessments. No modifications or assistance was required for completion of evaluation. Only brief occupational profile and history review completed."     Plan:     During this hospitalization, patient does not require further acute OT services.  Please re-consult if situation changes.    · Plan of Care Reviewed with: patient    This Plan of care has been discussed with the patient who was involved in its development and understands and is in agreement with the identified goals and treatment plan    GOALS:    Occupational Therapy Goals     Not on file          Multidisciplinary Problems (Resolved)        Problem: Occupational Therapy Goal    Goal Priority Disciplines Outcome Interventions   Occupational Therapy Goal   (Resolved) "     OT, PT/OT Outcome(s) achieved                    Time Tracking:     OT Date of Treatment: 01/31/18  OT Start Time: 0750  OT Stop Time: 0810  OT Total Time (min): 20 min    Billable Minutes:Evaluation 20    Shannen Viveros OT  1/31/2018

## 2018-01-31 NOTE — PT/OT/SLP EVAL
Physical Therapy Evaluation    Patient Name:  Tona Dey   MRN:  [id number]    Recommendations:     Discharge Recommendations:  nursing facility, skilled   Discharge Equipment Recommendations: none   Barriers to discharge: Decreased caregiver support    Assessment:     Tona Dey is a 86 y.o. male admitted with a medical diagnosis of Osteomyelitis.  He presents with the following impairments/functional limitations:  impaired endurance, impaired self care skills, impaired functional mobilty, gait instability, weakness, impaired cognition, decreased coordination, decreased upper extremity function, impaired balance, decreased lower extremity function, abnormal tone, impaired muscle length. Patient presents w/ rigidity synonymous w/ Parkinson's disease. Patient BUE/BLE ROM greatly limited and patient is mostly non-verbal. Patient does answer appropriately ~3x during session. Patient would benefit from PROM and encouragement for EOB activity.     Rehab Prognosis:  fair; patient would benefit from acute skilled PT services to address these deficits and reach maximum level of function.      Recent Surgery: * No surgery found *      Plan:     During this hospitalization, patient to be seen 3 x/week to address the above listed problems via therapeutic activities, therapeutic exercises, neuromuscular re-education, gait training  · Plan of Care Expires:  03/02/18   Plan of Care Reviewed with: patient    Subjective     Communicated with nurse prior to session.  Patient found supine upon PT entry to room, agreeable to evaluation.      Chief Complaint: impaired cognition, decreased functional mobility and self care skills  Patient comments/goals: none formally stated  Pain/Comfort:  · Pain Rating 1: 0/10 (paitent unable to accurately commumicate pain)    Patients cultural, spiritual, Buddhism conflicts given the current situation: none stated    Living Environment:  Patient hx and living situation unknown, will followup  w/ patient's family     Prior to admission, patients level of function was dependant.  Patient has the following equipment: cane, straight, walker, rolling, bath bench, wheelchair (per chart review).   Upon discharge, patient will have assistance from unkown.    Objective:     Patient found with:  (all lines intact)     General Precautions: Standard, fall   Orthopedic Precautions:N/A   Braces: N/A     Exams:  · Patient is alert but does not engage verbally in PT session. Patient has severe ROM rigidity in BUE/BLE.    Functional Mobility:  · Bed Mobility:     · Supine to Sit: total assistance x2  · Sit to Supine: total assistance x2  · Patient required Max A to sit EOB unsupported    AM-PAC 6 CLICK MOBILITY  Total Score:8       Therapeutic Activities and Exercises:   PT Eval completed    Patient left HOB elevated with all lines intact, call button in reach, bed alarm on and nurse notified.    GOALS:    Physical Therapy Goals        Problem: Physical Therapy Goal    Goal Priority Disciplines Outcome Goal Variances Interventions   Physical Therapy Goal     PT/OT, PT Ongoing (interventions implemented as appropriate)     Description:  Goals to be met by: 18     Patient will increase functional independence with mobility by performin. Supine to sit with Moderate Assistance  2. Sit to supine with Moderate Assistance  3. Rolling to Left and Right with Moderate Assistance.  4. Sitting at edge of bed x5 minutes with Minimal Assistance  5. Lower extremity exercise program focusing on PROM  *Sit>Stand Goal and Gait goal to be added when/if appropriate                        History:     Past Medical History:   Diagnosis Date    Anemia     Chronic back pain     Hypertension     Lumbago     Orthostatic hypotension dysautonomic syndrome     Parkinson disease     Renal disorder        Past Surgical History:   Procedure Laterality Date    BACK SURGERY      ROTATOR CUFF REPAIR         Clinical Decision  Making:     History  Co-morbidities and personal factors that may impact the plan of care Examination  Body Structures and Functions, activity limitations and participation restrictions that may impact the plan of care Clinical Presentation   Decision Making/ Complexity Score   Co-morbidities:   [] Time since onset of injury / illness / exacerbation  [] Status of current condition  []Patient's cognitive status and safety concerns    [] Multiple Medical Problems (see med hx)  Personal Factors:   [] Patient's age  [] Prior Level of function   [] Patient's home situation (environment and family support)  [x] Patient's level of motivation  [x] Expected progression of patient      HISTORY:(criteria)    [] 37859 - no personal factors/history    [x] 64334 - has 1-2 personal factor/comorbidity     [] 75497 - has >3 personal factor/comorbidity     Body Regions:  [] Objective examination findings  [] Head     []  Neck  [] Trunk   [] Upper Extremity  [] Lower Extremity    Body Systems:  [x] For communication ability, affect, cognition, language, and learning style: the assessment of the ability to make needs known, consciousness, orientation (person, place, and time), expected emotional /behavioral responses, and learning preferences (eg, learning barriers, education  needs)  [x] For the neuromuscular system: a general assessment of gross coordinated movement (eg, balance, gait, locomotion, transfers, and transitions) and motor function  (motor control and motor learning)  [x] For the musculoskeletal system: the assessment of gross symmetry, gross range of motion, gross strength, height, and weight  [] For the integumentary system: the assessment of pliability(texture), presence of scar formation, skin color, and skin integrity  [] For cardiovascular/pulmonary system: the assessment of heart rate, respiratory rate, blood pressure, and edema     Activity limitations:    [] Patient's cognitive status and saf ety concerns           [] Status of current condition      [] Weight bearing restriction  [] Cardiopulmunary Restriction    Participation Restrictions:   [] Goals and goal agreement with the patient     [] Rehab potential (prognosis) and probable outcome      Examination of Body System: (criteria)    [] 90250 - addressing 1-2 elements    [x] 22404 - addressing a total of 3 or more elements     [] 01751 -  Addressing a total of 4 or more elements         Clinical Presentation: (criteria)  Evolving - 00275     On examination of body system using standardized tests and measures patient presents with 3 or more elements from any of the following: body structures and functions, activity limitations, and/or participation restrictions.  Leading to a clinical presentation that is considered evolving with changing characteristics                              Clinical Decision Making  (Eval Complexity):  Moderate - 69837     Time Tracking:     PT Received On: 01/31/18  PT Start Time: 0753     PT Stop Time: 0801  PT Total Time (min): 8 min     Billable Minutes: Evaluation 8 Min      Jamal Gil, PT  01/31/2018

## 2018-01-31 NOTE — PROGRESS NOTES
Consult received on patient. Stage 2 pressure injury noted to patient's sacral spine, appears to be healing, epithelization noted. Recommend dressing with sacral foam dressing and change twice a week. Low air loss overlay ordered for patient. Turn every 2hrs. Discussed with Dr. Barry, orders placed for nursing. Nursing to continue care.     01/31/18 0831       Pressure Injury 01/29/18 2000 Sacral spine Stage 2   Date First Assessed/Time First Assessed: 01/29/18 2000   Pressure Injury Present on Admission: yes  Location: Sacral spine  Staging: Stage 2   Staging 2   Drainage Amount None   Drainage Characteristics/Odor No odor   Appearance Moist;Pink;Epithelialization   Tissue loss description Partial thickness   Red (%), Wound Tissue Color 100 %   Periwound Area Intact   Wound Edges Open   Wound Length (cm) 0.5   Wound Width (cm) 0.3   Depth (cm) 0   Dressing Foam   Dressing Change Due 02/02/18

## 2018-01-31 NOTE — PLAN OF CARE
Problem: Patient Care Overview  Goal: Plan of Care Review  Outcome: Ongoing (interventions implemented as appropriate)  Pt alert and responsive, however, non-verbal to the nurse on this shift, communicated with eye contact and body language. Pain management in progress with PRN tramadol and oxycodone with effect. Turned and repositioned q2hr. IVF NS @100cc/hr continuously infused. Incontinent care done by PCT. Kept dry and comfortable. All needs anticipated. No distress noted.

## 2018-01-31 NOTE — PROGRESS NOTES
[facility] Hospital Medicine  Progress Note    Patient Name: Tona Dey  MRN: [id number]  Patient Class: IP- Inpatient   Admission Date: 1/29/2018  Length of Stay: 1 days  Attending Physician: Davon Barry MD  Primary Care Provider: Primary Doctor [facility] Medicine Team: [facility] HOSP MED A Davon Barry MD    Subjective:     Principal Problem:Osteomyelitis      HPI: Mr. Tona Dey is a 86 y.o. male with Parkinson's Disease, bed bound, autonomic instability, recent LLE DVT on Eliquis, and obesity who presents to the ED because of fevers and toe pain. He mentions that for about 2 weeks, he has been having difficulty walking because of toe pain.  He denies any numbness or tingling in his feet.  He does have chronic LE swelling.  He endorses some drainage from the wound at night, but denies any foul smelling odor.  He denies any trauma to the toe.  He got antibiotics from his PCP, but is unsure of which one.  Family reports fevers up to 101.1F at home with a cough.     Patient information was obtained from patient and ER records.       Hospital Course: Admitted for 1/29 for possible osteo left foot, ulceration left great toe. Unsure of how injury occurred to left great toenail. Reports pain upon palpation right great toe nail. Per chart review patient on abx for left great toe infection per PCP. Pt's wife has various concern including arthralgias, swelling      Interval History: Wife is concern about various joint pain, pt is very contracted lower extremities due to parkinson disease and that he is bedbound.      Review of Systems   Constitutional: Positive for activity change.   Respiratory: Negative for shortness of breath.    Cardiovascular: Negative for chest pain and leg swelling.   Gastrointestinal: Negative for nausea and vomiting.   Genitourinary: Negative.    Musculoskeletal: Positive for arthralgias and myalgias.   Skin: Positive for color change.   Neurological: Positive for  numbness. Negative for weakness.   Psychiatric/Behavioral: Negative.       Objective:     Vital Signs (Most Recent):  Temp: 99.1 °F (37.3 °C) (01/30/18 1613)  Pulse: 89 (01/30/18 1613)  Resp: 18 (01/30/18 1613)  BP: 134/61 (01/30/18 1613)  SpO2: 96 % (01/30/18 1613) Vital Signs (24h Range):  Temp:  [97.9 °F (36.6 °C)-99.1 °F (37.3 °C)] 99.1 °F (37.3 °C)  Pulse:  [87-96] 89  Resp:  [14-21] 18  SpO2:  [93 %-100 %] 96 %  BP: (101-150)/(54-72) 134/61     Weight: 98 kg (216 lb)  Body mass index is 33.83 kg/m².  No intake or output data in the 24 hours ending 01/30/18 1910     Physical Exam  Constitutional: Oriented to person, place, and time. Appears well-developed and well-nourished.   Head: Normocephalic and atraumatic.   Mouth/Throat: Oropharynx is clear and moist.   Eyes: EOM are normal. Pupils are equal, round, and reactive to light. No scleral icterus.   Neck: Normal range of motion. Neck supple.   Cardiovascular: Normal rate and regular rhythm.  No murmur heard.  Pulmonary/Chest: Effort normal and breath sounds normal. No respiratory distress. No wheezes, rales, or rhonchi  Abdominal: Soft. Bowel sounds are normal.  No distension or tenderness  Musculoskeletal: Normal range of motion of both feet. Pedal edema  Neurological: Alert and oriented to person, place, and time. Sensation intact  Skin: Skin is warm and dry. L toe nail place removed per podiatry  Psychiatric: Normal mood and affect. Behavior is normal      Significant Labs:   CBC:   Recent Labs  Lab 01/29/18  1441 01/30/18 0419   WBC 4.99 6.27   HGB 9.2* 9.0*   HCT 28.5* 27.9*    234     CMP:   Recent Labs  Lab 01/29/18  1441 01/30/18  0419    137   K 4.1 4.1    107   CO2 27 22*   * 117*   BUN 11 17   CREATININE 0.8 0.8   CALCIUM 9.1 8.8   PROT 6.7 6.5   ALBUMIN 2.7* 2.7*   BILITOT 0.8 0.9   ALKPHOS 184* 185*   AST 65* 45*   * 23   ANIONGAP 7* 8   EGFRNONAA >60.0 >60.0       Significant Imaging: I have reviewed all  pertinent imaging results/findings within the past 24 hours.    Assessment/Plan:      Active Diagnoses:    Diagnosis Date Noted POA    PRINCIPAL PROBLEM:  Osteomyelitis [M86.9] 01/29/2018 Yes    Mild cognitive impairment [G31.84] 10/28/2017 Yes    Long term current use of anticoagulant therapy [Z79.01] 10/11/2017 Not Applicable    Parkinsonism [G20] 10/07/2017 Yes    Acute deep vein thrombosis (DVT) of popliteal vein of left lower extremity [I82.432] 10/05/2017 Yes    Lower extremity edema [R60.0] 10/02/2017 Yes    Autonomic instability [G90.9] 09/01/2017 Yes     Chronic    Obesity (BMI 30-39.9) [E66.9] 01/27/2016 Unknown      Problems Resolved During this Admission:    Diagnosis Date Noted Date Resolved POA     VTE Risk Mitigation         Ordered     apixaban tablet 5 mg  2 times daily     Route:  Oral        01/30/18 1047          Osteomyelitis of Left First Toe  - XR with concern for erosive changes of the left first metatarsalphalangeal joint  - ESR 58 and CRP 14 without fevers or leukocytosis  - Will hold off on MRI until evaluated by Podiatry.  - Podiatry consulted, appreciate assistance, No signs of bone infection at this time. Will hold off on further imaging and bone biopsy at this time  - Unclear if chronic vs new, pt without sign of fever, leukocytosis  - will consider PO cipro      Parkinsonism  Mild Cognitive Impairment  - Stable, pt is bedbound, per wife Sinemet 25-100mg was stopped  - pt with LE contractures, painful when extending his LE ex bilat  - discuss with wife that this is progression  - trial of baclofen      Autonomic Instability  - Stable  - Per wife, Midodrine 10mg PO TID (is more of PRN, she does not give it if SBP >130?)       Acute DVT of LLE Popliteal Vein  Lower Extremity DVT  US LE 1/29 without new acute DVT  Restart Eliqius as no procedures are planned    Sacral Wound  - Wound Care consult, unable to see pt today due to schedule, they will see patient tomorrow       Diet:   Adult regular.   GI PPx:  Not indicated  DVT PPx:  Home Eliquis  Goals of Care:  Full Code     Disposition: PT/OT eval, suspect home with home health soon, wound care eval needed as well       Davon Barry MD  Department of Hospital Medicine   [facility]

## 2018-01-31 NOTE — PROGRESS NOTES
[facility]  Podiatry  Progress Note    Patient Name: Tona Dey  MRN: [id number]  Admission Date: 1/29/2018  Hospital Length of Stay: 2 days  Attending Physician: Davon Barry MD  Primary Care Provider: Primary Doctor No   Interval Hx:  Patient s/p left hallux avulsion.  Patient seen at bedside with wife present.  Patient non verbal and not arousable, wife expresses concern about his state of being.  Dressings clean, dry, and intact.  No heel protector boots inplace    Scheduled Meds:   apixaban  5 mg Oral BID    baclofen  10 mg Oral TID    indomethacin  25 mg Oral TID WM    neomycin-bacitracin-polymyxin   Topical (Top) Daily     Continuous Infusions:    PRN Meds:acetaminophen, dextrose 50%, dextrose 50%, glucagon (human recombinant), glucose, glucose, ondansetron, oxyCODONE, ramelteon, sodium chloride 0.9%, traMADol    Review of patient's allergies indicates:  No Known Allergies     Past Medical History:   Diagnosis Date    Anemia     Chronic back pain     Hypertension     Lumbago     Orthostatic hypotension dysautonomic syndrome     Parkinson disease     Renal disorder      Past Surgical History:   Procedure Laterality Date    BACK SURGERY  1961    ROTATOR CUFF REPAIR         Family History     Problem Relation (Age of Onset)    Cancer Father    No Known Problems Mother        Social History Main Topics    Smoking status: Former Smoker     Quit date: 8/25/1985    Smokeless tobacco: Never Used    Alcohol use 1.2 oz/week     2 Shots of liquor per week    Drug use: Unknown    Sexual activity: Yes     Partners: Female     Review of Systems   Constitutional: Positive for activity change.   Respiratory: Negative for shortness of breath.    Cardiovascular: Negative for chest pain and leg swelling.   Gastrointestinal: Negative for nausea and vomiting.   Genitourinary: Negative.    Musculoskeletal: Positive for arthralgias and myalgias.   Skin: Positive for color change.    Neurological: Positive for numbness. Negative for weakness.   Psychiatric/Behavioral: Negative.      Objective:     Vital Signs (Most Recent):  Temp: 98.5 °F (36.9 °C) (01/31/18 0746)  Pulse: 77 (01/31/18 0746)  Resp: 18 (01/31/18 0746)  BP: (!) 147/67 (01/31/18 0746)  SpO2: 95 % (01/31/18 0746) Vital Signs (24h Range):  Temp:  [98 °F (36.7 °C)-99.1 °F (37.3 °C)] 98.5 °F (36.9 °C)  Pulse:  [77-89] 77  Resp:  [15-18] 18  SpO2:  [93 %-98 %] 95 %  BP: (113-149)/(57-76) 147/67     Weight: 98 kg (216 lb)  Body mass index is 33.83 kg/m².    Foot Exam    General  Affect: appropriate       Right Foot/Ankle     Inspection and Palpation  Tenderness: none   Swelling: none   Skin Exam: skin intact;     Neurovascular  Dorsalis pedis: 1+  Posterior tibial: 1+  Saphenous nerve sensation: diminished  Tibial nerve sensation: diminished  Superficial peroneal nerve sensation: diminished  Deep peroneal nerve sensation: diminished  Sural nerve sensation: diminished      Left Foot/Ankle      Inspection and Palpation  Tenderness: great toe interphalangeal joint   Swelling: great toe metatarsophalangeal joint   Skin Exam: drainage, skin changes and abnormal color;     Neurovascular  Dorsalis pedis: 1+  Posterior tibial: 1+  Saphenous nerve sensation: diminished  Tibial nerve sensation: diminished  Superficial peroneal nerve sensation: diminished  Deep peroneal nerve sensation: diminished  Sural nerve sensation: diminished    Comments  Sanguinous drainage with mild purulence noted surrounding left hallux nail border. Nail noted to be detached from proximal nail bed by 60%    Left heel decubitus ulceration      1/31/18 1/30          Laboratory:  CBC:     Recent Labs  Lab 01/31/18 0726   WBC 6.17   RBC 2.83*   HGB 9.2*   HCT 28.4*      *   MCH 32.5*   MCHC 32.4     CMP:     Recent Labs  Lab 01/31/18 0726      CALCIUM 8.6*   ALBUMIN 2.5*   PROT 6.2      K 4.7   CO2 23      BUN 16   CREATININE 0.7    ALKPHOS 143*   ALT 96*   AST 60*   BILITOT 0.9       Diagnostic Results:  CHELE:  Impression:   Right Leg: CHELE ( 1.21) and PVR waveforms suggest no evidence of PAOD.    Left Leg: CHELE ( 1.15) and PVR waveforms suggest no evidence of PAOD.    X-Ray:No convincing acute osseous or erosive or destructive process to convincingly suggest infection although there is some erosive change involving the first metatarsophalangeal joint, suspected to be on the basis of degenerative change.  Correlation recommended, 3 phase bone scan to exclude infection as warranted.    Clinical Findings:  S/p hallax nail removal, dried hematoma with no active drainage, erythema.  Diffuse edema to LLE.     Assessment/Plan:     * Osteomyelitis    Tona Dey is a 86 y.o. male s/p left hallux nail removal (1/30).  No clinical signs of infection, stable  -Aerobic, anaerobic cultures pending.   -Dressed with betadine and mepilex border.  -Heel protector boots to be worn  -No acute surgical intervention indicated at this time.  -CHELE's not concerning for PAD.  -Paged medicine team about patients mental status change.    Weightbearing Status: WBAT left  Offloading Device: DARCO shoe                 Long term current use of anticoagulant therapy    Per Primary           Acute deep vein thrombosis (DVT) of popliteal vein of left lower extremity    Per Primary           Lower extremity edema    Per Primary               Javed Vargas MD  Podiatry  [facility]

## 2018-01-31 NOTE — PLAN OF CARE
Problem: Fall Risk (Adult)  Goal: Identify Related Risk Factors and Signs and Symptoms  Related risk factors and signs and symptoms are identified upon initiation of Human Response Clinical Practice Guideline (CPG)    01/30/18 1941   Fall Risk   Related Risk Factors (Fall Risk) age-related changes;bladder function altered;history of falls   Signs and Symptoms (Fall Risk) presence of risk factors

## 2018-01-31 NOTE — SUBJECTIVE & OBJECTIVE
Interval Hx:  Patient s/p left hallux avulsion.  Patient seen at bedside with wife present.  Patient non verbal and not arousable, wife expresses concern about his state of being.  Dressings clean, dry, and intact.  No heel protector boots inplace    Scheduled Meds:   apixaban  5 mg Oral BID    baclofen  10 mg Oral TID    indomethacin  25 mg Oral TID WM    neomycin-bacitracin-polymyxin   Topical (Top) Daily     Continuous Infusions:    PRN Meds:acetaminophen, dextrose 50%, dextrose 50%, glucagon (human recombinant), glucose, glucose, ondansetron, oxyCODONE, ramelteon, sodium chloride 0.9%, traMADol    Review of patient's allergies indicates:  No Known Allergies     Past Medical History:   Diagnosis Date    Anemia     Chronic back pain     Hypertension     Lumbago     Orthostatic hypotension dysautonomic syndrome     Parkinson disease     Renal disorder      Past Surgical History:   Procedure Laterality Date    BACK SURGERY  1961    ROTATOR CUFF REPAIR         Family History     Problem Relation (Age of Onset)    Cancer Father    No Known Problems Mother        Social History Main Topics    Smoking status: Former Smoker     Quit date: 8/25/1985    Smokeless tobacco: Never Used    Alcohol use 1.2 oz/week     2 Shots of liquor per week    Drug use: Unknown    Sexual activity: Yes     Partners: Female     Review of Systems   Constitutional: Positive for activity change.   Respiratory: Negative for shortness of breath.    Cardiovascular: Negative for chest pain and leg swelling.   Gastrointestinal: Negative for nausea and vomiting.   Genitourinary: Negative.    Musculoskeletal: Positive for arthralgias and myalgias.   Skin: Positive for color change.   Neurological: Positive for numbness. Negative for weakness.   Psychiatric/Behavioral: Negative.      Objective:     Vital Signs (Most Recent):  Temp: 98.5 °F (36.9 °C) (01/31/18 0746)  Pulse: 77 (01/31/18 0746)  Resp: 18 (01/31/18 0746)  BP: (!) 147/67  (01/31/18 0746)  SpO2: 95 % (01/31/18 0746) Vital Signs (24h Range):  Temp:  [98 °F (36.7 °C)-99.1 °F (37.3 °C)] 98.5 °F (36.9 °C)  Pulse:  [77-89] 77  Resp:  [15-18] 18  SpO2:  [93 %-98 %] 95 %  BP: (113-149)/(57-76) 147/67     Weight: 98 kg (216 lb)  Body mass index is 33.83 kg/m².    Foot Exam    General  Affect: appropriate       Right Foot/Ankle     Inspection and Palpation  Tenderness: none   Swelling: none   Skin Exam: skin intact;     Neurovascular  Dorsalis pedis: 1+  Posterior tibial: 1+  Saphenous nerve sensation: diminished  Tibial nerve sensation: diminished  Superficial peroneal nerve sensation: diminished  Deep peroneal nerve sensation: diminished  Sural nerve sensation: diminished      Left Foot/Ankle      Inspection and Palpation  Tenderness: great toe interphalangeal joint   Swelling: great toe metatarsophalangeal joint   Skin Exam: drainage, skin changes and abnormal color;     Neurovascular  Dorsalis pedis: 1+  Posterior tibial: 1+  Saphenous nerve sensation: diminished  Tibial nerve sensation: diminished  Superficial peroneal nerve sensation: diminished  Deep peroneal nerve sensation: diminished  Sural nerve sensation: diminished    Comments  Sanguinous drainage with mild purulence noted surrounding left hallux nail border. Nail noted to be detached from proximal nail bed by 60%    Left heel decubitus ulceration      1/31/18 1/30          Laboratory:  CBC:     Recent Labs  Lab 01/31/18  0726   WBC 6.17   RBC 2.83*   HGB 9.2*   HCT 28.4*      *   MCH 32.5*   MCHC 32.4     CMP:     Recent Labs  Lab 01/31/18  0726      CALCIUM 8.6*   ALBUMIN 2.5*   PROT 6.2      K 4.7   CO2 23      BUN 16   CREATININE 0.7   ALKPHOS 143*   ALT 96*   AST 60*   BILITOT 0.9       Diagnostic Results:  CHELE:  Impression:   Right Leg: CHELE ( 1.21) and PVR waveforms suggest no evidence of PAOD.    Left Leg: CHELE ( 1.15) and PVR waveforms suggest no evidence of PAOD.    X-Ray:No  convincing acute osseous or erosive or destructive process to convincingly suggest infection although there is some erosive change involving the first metatarsophalangeal joint, suspected to be on the basis of degenerative change.  Correlation recommended, 3 phase bone scan to exclude infection as warranted.    Clinical Findings:  S/p hallax nail removal, dried hematoma with no active drainage, erythema.  Diffuse edema to LLE.

## 2018-01-31 NOTE — PLAN OF CARE
Problem: Pressure Ulcer Risk (Neville Scale) (Adult,Obstetrics,Pediatric)  Goal: Identify Related Risk Factors and Signs and Symptoms  Related risk factors and signs and symptoms are identified upon initiation of Human Response Clinical Practice Guideline (CPG)   Outcome: Ongoing (interventions implemented as appropriate)   01/30/18 1943   Pressure Ulcer Risk (Neville Scale)   Related Risk Factors (Pressure Ulcer Risk (Neville Scale)) hospitalization prolonged;fluid intake inadequate;excretions/secretions

## 2018-01-31 NOTE — PLAN OF CARE
Problem: Patient Care Overview  Goal: Plan of Care Review  Outcome: Ongoing (interventions implemented as appropriate)   01/30/18 3152   Coping/Psychosocial   Plan Of Care Reviewed With patient

## 2018-01-31 NOTE — ASSESSMENT & PLAN NOTE
Tona Dey is a 86 y.o. male s/p left hallux nail removal (1/30).  No clinical signs of infection, stable  -Aerobic, anaerobic cultures pending.   -Heel protector boots to be worn  -No acute surgical intervention indicated at this time.  -CHELE's not concerning for PAD.    Weightbearing Status: WBAT left  Offloading Device: DARCO shoe

## 2018-01-31 NOTE — PLAN OF CARE
Problem: Occupational Therapy Goal  Goal: Occupational Therapy Goal  Outcome: Outcome(s) achieved Date Met: 01/31/18  Evaluation complete.  Pt unable to participate in skilled OT services and with ROM needs to be addressed in PT sessions.  DC OT  Shannen Viveros, OZZY  1/31/2018

## 2018-01-31 NOTE — PLAN OF CARE
Problem: Physical Therapy Goal  Goal: Physical Therapy Goal  Goals to be met by: 18     Patient will increase functional independence with mobility by performin. Supine to sit with Moderate Assistance  2. Sit to supine with Moderate Assistance  3. Rolling to Left and Right with Moderate Assistance.  4. Sitting at edge of bed x5 minutes with Minimal Assistance  5. Lower extremity exercise program focusing on PROM  *Sit>Stand Goal and Gait goal to be added when/if appropriate      Outcome: Ongoing (interventions implemented as appropriate)  PT Goals established following initial eval    Jamal Gil, PT  2018

## 2018-02-01 PROBLEM — M25.50 ARTHRALGIA: Status: ACTIVE | Noted: 2018-02-01

## 2018-02-01 LAB
ALBUMIN SERPL BCP-MCNC: 2.4 G/DL
ALP SERPL-CCNC: 122 U/L
ALT SERPL W/O P-5'-P-CCNC: 94 U/L
ANION GAP SERPL CALC-SCNC: 6 MMOL/L
AST SERPL-CCNC: 46 U/L
BACTERIA SPEC AEROBE CULT: NORMAL
BASOPHILS # BLD AUTO: 0.02 K/UL
BASOPHILS NFR BLD: 0.2 %
BILIRUB SERPL-MCNC: 0.9 MG/DL
BUN SERPL-MCNC: 12 MG/DL
CALCIUM SERPL-MCNC: 8.5 MG/DL
CHLORIDE SERPL-SCNC: 109 MMOL/L
CO2 SERPL-SCNC: 24 MMOL/L
CREAT SERPL-MCNC: 0.7 MG/DL
DIFFERENTIAL METHOD: ABNORMAL
EOSINOPHIL # BLD AUTO: 0.1 K/UL
EOSINOPHIL NFR BLD: 1.1 %
ERYTHROCYTE [DISTWIDTH] IN BLOOD BY AUTOMATED COUNT: 14 %
EST. GFR  (AFRICAN AMERICAN): >60 ML/MIN/1.73 M^2
EST. GFR  (NON AFRICAN AMERICAN): >60 ML/MIN/1.73 M^2
GLUCOSE SERPL-MCNC: 123 MG/DL
HCT VFR BLD AUTO: 26.4 %
HGB BLD-MCNC: 8.4 G/DL
IMM GRANULOCYTES # BLD AUTO: 0.05 K/UL
IMM GRANULOCYTES NFR BLD AUTO: 0.6 %
LACTATE SERPL-SCNC: 1.2 MMOL/L
LYMPHOCYTES # BLD AUTO: 2 K/UL
LYMPHOCYTES NFR BLD: 24.2 %
MAGNESIUM SERPL-MCNC: 1.6 MG/DL
MCH RBC QN AUTO: 31.9 PG
MCHC RBC AUTO-ENTMCNC: 31.8 G/DL
MCV RBC AUTO: 100 FL
MONOCYTES # BLD AUTO: 0.8 K/UL
MONOCYTES NFR BLD: 9.5 %
NEUTROPHILS # BLD AUTO: 5.3 K/UL
NEUTROPHILS NFR BLD: 64.4 %
NRBC BLD-RTO: 0 /100 WBC
PHOSPHATE SERPL-MCNC: 2.4 MG/DL
PLATELET # BLD AUTO: 220 K/UL
PMV BLD AUTO: 10.1 FL
POCT GLUCOSE: 121 MG/DL (ref 70–110)
POTASSIUM SERPL-SCNC: 3.9 MMOL/L
PROT SERPL-MCNC: 5.8 G/DL
RBC # BLD AUTO: 2.63 M/UL
SODIUM SERPL-SCNC: 139 MMOL/L
WBC # BLD AUTO: 8.22 K/UL

## 2018-02-01 PROCEDURE — 25000003 PHARM REV CODE 250: Performed by: HOSPITALIST

## 2018-02-01 PROCEDURE — 85025 COMPLETE CBC W/AUTO DIFF WBC: CPT

## 2018-02-01 PROCEDURE — 97535 SELF CARE MNGMENT TRAINING: CPT

## 2018-02-01 PROCEDURE — 36415 COLL VENOUS BLD VENIPUNCTURE: CPT

## 2018-02-01 PROCEDURE — 25000003 PHARM REV CODE 250: Performed by: PODIATRIST

## 2018-02-01 PROCEDURE — 92610 EVALUATE SWALLOWING FUNCTION: CPT

## 2018-02-01 PROCEDURE — 83735 ASSAY OF MAGNESIUM: CPT

## 2018-02-01 PROCEDURE — 99232 SBSQ HOSP IP/OBS MODERATE 35: CPT | Mod: ,,, | Performed by: HOSPITALIST

## 2018-02-01 PROCEDURE — 99233 SBSQ HOSP IP/OBS HIGH 50: CPT | Mod: ,,, | Performed by: PSYCHIATRY & NEUROLOGY

## 2018-02-01 PROCEDURE — 80053 COMPREHEN METABOLIC PANEL: CPT

## 2018-02-01 PROCEDURE — 84100 ASSAY OF PHOSPHORUS: CPT

## 2018-02-01 PROCEDURE — 11000001 HC ACUTE MED/SURG PRIVATE ROOM

## 2018-02-01 PROCEDURE — 63600175 PHARM REV CODE 636 W HCPCS: Performed by: HOSPITALIST

## 2018-02-01 PROCEDURE — 83605 ASSAY OF LACTIC ACID: CPT

## 2018-02-01 RX ORDER — KETOROLAC TROMETHAMINE 15 MG/ML
30 INJECTION, SOLUTION INTRAMUSCULAR; INTRAVENOUS ONCE
Status: COMPLETED | OUTPATIENT
Start: 2018-02-01 | End: 2018-02-01

## 2018-02-01 RX ORDER — KETOROLAC TROMETHAMINE 15 MG/ML
15 INJECTION, SOLUTION INTRAMUSCULAR; INTRAVENOUS EVERY 8 HOURS PRN
Status: DISPENSED | OUTPATIENT
Start: 2018-02-01 | End: 2018-02-04

## 2018-02-01 RX ORDER — GABAPENTIN 300 MG/1
300 CAPSULE ORAL NIGHTLY
Status: DISCONTINUED | OUTPATIENT
Start: 2018-02-01 | End: 2018-02-01

## 2018-02-01 RX ORDER — LIDOCAINE 50 MG/G
2 PATCH TOPICAL
Status: DISCONTINUED | OUTPATIENT
Start: 2018-02-01 | End: 2018-02-06 | Stop reason: HOSPADM

## 2018-02-01 RX ORDER — GABAPENTIN 100 MG/1
100 CAPSULE ORAL NIGHTLY
Status: DISCONTINUED | OUTPATIENT
Start: 2018-02-01 | End: 2018-02-03

## 2018-02-01 RX ADMIN — LIDOCAINE 2 PATCH: 50 PATCH TOPICAL at 03:02

## 2018-02-01 RX ADMIN — ACETAMINOPHEN 650 MG: 325 TABLET, FILM COATED ORAL at 12:02

## 2018-02-01 RX ADMIN — KETOROLAC TROMETHAMINE 30 MG: 15 INJECTION, SOLUTION INTRAMUSCULAR; INTRAVENOUS at 03:02

## 2018-02-01 RX ADMIN — BACITRACIN ZINC NEOMYCIN SULFATE POLYMYXIN B SULFATE: 400; 3.5; 5 OINTMENT TOPICAL at 09:02

## 2018-02-01 RX ADMIN — CIPROFLOXACIN 400 MG: 2 INJECTION, SOLUTION INTRAVENOUS at 10:02

## 2018-02-01 RX ADMIN — APIXABAN 5 MG: 2.5 TABLET, FILM COATED ORAL at 09:02

## 2018-02-01 NOTE — ASSESSMENT & PLAN NOTE
- Patient initially noted to have parkinsonian symptoms on examination during admission in October; was started on Sinemet TID ( 25/100) but due to intolerance stopped the medication at home  - [facility] appointment to establish care was cancelled by the patient  - Wife would not like to re-start the Sinemet at this time  - Recommend to start Gabapentin 300 TID for current pain management; can start w/ 300 QHS to assess how well patient tolerates  - Follow up w/ [facility] is recommended upon discharge

## 2018-02-01 NOTE — PLAN OF CARE
Problem: SLP Goal  Goal: SLP Goal  Speech Language Pathology Goals  Goals expected to be met by 2/8  1. Ongoing swallow assessment    SLP Clinical Swallow Evaluation completed. See note for details.     Manda Hernandez MS, CCC-SLP  Speech Language Pathologist  Pager: [phone]  2/1/2018

## 2018-02-01 NOTE — ASSESSMENT & PLAN NOTE
- Patient states that since the initiation of Eliquis he has experienced diffuse arthralgias; 2/2 this wife has only been giving patient medication QD rather than prescribed BID dose   - This is listed as a known side effect   - Would consider switching to another NOAC at this time

## 2018-02-01 NOTE — PT/OT/SLP EVAL
Speech Language Pathology Evaluation  Bedside Swallow    Patient Name:  Tona Dey   MRN:  [id number]  Admitting Diagnosis: Osteomyelitis    Recommendations:                 General Recommendations: ongoing swallow assessment. SLP recs that swallow be assessed at bedside tomorrow AM to determine if safe to resume PO diet or if Modified barium swallow study is necessary. Modified Barium Swallow Study is currently scheduled for Friday, Feb2 at 1:30 pm & can be cancelled if unnecessary.  Recs d/w Dr. Barry  Diet recommendations:  NPO, NPO   Aspiration Precautions: Meds crushed in puree   General Precautions: Standard, aspiration, fall, NPO      History:     Past Medical History:   Diagnosis Date    Anemia     Chronic back pain     Hypertension     Lumbago     Orthostatic hypotension dysautonomic syndrome     Parkinson disease     Renal disorder        Past Surgical History:   Procedure Laterality Date    BACK SURGERY  1961    ROTATOR CUFF REPAIR         Chest X-Rays: 1/29/18 - Hypoventilatory exam, no convincing acute cardiopulmonary process, obscuration of the left costophrenic angle is likely related to soft tissue rather than pleural fluid    Prior diet: pt on puree/thin when swallow eval ordered. Pt did not report diet prior to hospitalization. NSG spoke with PCT who reported pt tolerated puree & thin without difficulty.       Subjective   Pt asleep upon SLP entry. Pt aroused given mod verbal & tactile stim from NSG & SLP.     Objective:     Oral Musculature Evaluation  · Oral Musculature: general weakness  · Dentition: present and adequate  · Oral Labial Strength and Mobility: impaired retraction, impaired pursing  · Lingual Strength and Mobility: impaired strength, impaired protrusion, impaired anterior elevation  · Volitional Cough: weak  · Volitional Swallow: decreased hyolaryngeal excursion via digital palpatopn   · Voice Prior to PO Intake: unable to elicit prior to PO trials    Bedside Swallow Eval:    Consistencies Assessed:  · Thin liquids ice chip x1, via spoon x2  · Nectar thick liquids via spoon x2  · Puree via spoon x2     Oral Phase:   · Slow oral transit time    Pharyngeal Phase:   · decreased hyolaryngeal excursion to palpation & delayed swallow initiation with all trials  · Coughing/choking after initial ice chip & wet vocal quality after swallow. Pt cued to throat clear & dry swallow but unable to clear slight wet vocal quality. No further coughing but wet vocal quality continued with all further trials. SLP unable to rule out that continued wet vocal quality 2/2 initial thin trial. Aspiration of nectar & puree cannot be ruled out at bedside.     Compensatory Strategies  · Volitional cough/throat clear    Treatment: SLP d/w pt regarding concern for aspiration, s/s aspiration, risks of aspiration, recs, precautions, role of ST & POC.     Assessment:     Tona Dey is a 86 y.o. male with an SLP diagnosis of Dysphagia.  He presents with s/s aspiration, unable to rule out aspiration at bedside at this time  Goals:    SLP Goals        Problem: SLP Goal    Goal Priority Disciplines Outcome   SLP Goal     SLP    Description:  Speech Language Pathology Goals  Goals expected to be met by 2/8  1. Ongoing swallow assessment                      Plan:     · Patient to be seen:  4 x/week   · Plan of Care expires:  03/02/18  · Plan of Care reviewed with:  patient   · SLP Follow-Up:  Yes       Discharge recommendations:  nursing facility, skilled     Time Tracking:     SLP Treatment Date:   02/01/18  Speech Start Time:  1105  Speech Stop Time:  1123     Speech Total Time (min):  18 min    Billable Minutes: Eval Swallow and Oral Function 10 and Seld Care/Home Management Training 8    Manda Hernandez CCC-SLP  02/01/2018   [phone]

## 2018-02-01 NOTE — MEDICAL/APP STUDENT
NEUROLOGICAL EXAMINATION:     MENTAL STATUS   Oriented to person.   Disoriented to place.   Disoriented to time.     CRANIAL NERVES     CN II   Visual acuity: (unable to accurately assess due to altered mental status)  Right visual field deficit: none  Left visual field deficit: none     CN III, IV, VI   Pupils are equal, round, and reactive to light.  Extraocular motions are normal.   CN III: no CN III palsy  CN VI: no CN VI palsy  Nystagmus: none     CN VII   Facial expression full, symmetric.     CN IX, X   CN IX normal.   CN X normal.     CN XII   CN XII normal.        Difficult to assess most cranial nerves as patient struggled to follow commands.  Patient notable for masked facies consistent with Parkinson disease.     MOTOR EXAM   Muscle bulk: normal  Overall muscle tone: increased  Right arm tone: cogwheel rigidity and increased  Left arm tone: cogwheel rigidity and increased    Strength   Right deltoid: 5/5  Left deltoid: 5/5  Right biceps: 5/5  Left biceps: 5/5  Right wrist flexion: 5/5  Left wrist flexion: 5/5  Right wrist extension: 5/5  Left wrist extension: 5/5  Right abdominals: 5/5  Left abdominals: 5/5       Lower limbs were difficult to assess because patient complained of pain during movement.     REFLEXES     Reflexes   Right brachioradialis: 2+  Left brachioradialis: 2+  Right biceps: 2+  Left biceps: 2+  Right triceps: 2+  Left triceps: 2+  Right patellar: 1+  Left patellar: 1+  Right achilles: 1+  Left achilles: 1+  Right : 2+  Left : 2+       Lower limbs difficult to assess because patient claimed of pain during exam.     SENSORY EXAM   Light touch normal.   Vibration normal.        Pinprick not tested     GAIT AND COORDINATION     Gait  Gait: (not assessed, patient bed-bound)     Coordination   Finger-nose-finger test: bradykinesia noted, coordination subpar.    Tremor   Resting tremor: slight resting tremor noticed right upper extremity.     Progress Note  ***    Admit Date:  1/29/2018    LOS: 3    Subjective     Consult neurology for: Parkinson disease    Brief HPI:   Tona Dey is a 86 y.o. male w/ pmh significant for Parkinson disease, HTN, PAD, DVT of L popliteal vein, and mild cognitive impairment presenting for pain in L big toe.  PT presented to hospital s/p fall and avulsion of L hallux.  At time of presentation, sanguinous drainage with mild purulence noted surrounding left hallux nail border.  S/p L hallux nail removal 1/30/18.  Concern for osteomyelitis L big toe. XR with concern for erosive changes of the left first metatarsalphalangeal joint.  No convincing acute osseous or erosive or destructive process to convincingly suggest infection although there is some erosive change involving the first metatarsophalangeal joint, suspected to be on the basis of degenerative change.  Correlation recommended, 3 phase bone scan to exclude infection as warranted.No signs of bone infection at this time. Will hold off on further imaging and bone biopsy at this time. No acute surgical intervention indicated at this time.          Hospital Course: Admitted for 1/29 for possible osteo left foot, ulceration left great toe. Unsure of how injury occurred to left great toenail. Reports pain upon palpation right great toe nail. Per chart review patient on abx for left great toe infection per PCP. Pt's wife has various concern including arthralgias, swelling. Podiatry consulted, s/p hallax nail removal.  No signs of bone infection at this time. Will hold off on further imaging and bone biopsy at this time. No acute surgical intervention indicated at this time. CHELE's not concerning for PAD.      On 1/31, pt with more somnolence mental status, he did get Ultram this AM. He was sitting up eating breakfast this AM. No sign of infection but given mental status, will add IV cipro for soft tissue coverage. Discuss with wife, who is report that he fully extend arms, legs and feed himself 2 days ago.  But even in ED and now patient is very stiff/ridgid and would complain of pain when we try to straighten legs and arms.      Review of Systems:  Limited due to mental status.  Wife not present during consult.    Objective     Vitals  Temp:  [98.1 °F (36.7 °C)-99 °F (37.2 °C)]   Pulse:  [85-97]   Resp:  [17-18]   BP: (112-130)/(56-68)   SpO2:  [93 %-98 %]      I & O (Last 24H):  Intake/Output Summary (Last 24 hours) at 02/01/18 0906  Last data filed at 01/31/18 1800   Gross per 24 hour   Intake              350 ml   Output                5 ml   Net              345 ml         Physical Exam:  Constitutional: Elderly, frailed male, thin  Cardiovascular: Normal rate and regular rhythm.  No murmur heard.  Pulmonary/Chest: Effort normal and breath sounds normal. No respiratory distress. No wheezes, rales, or rhonchi  Abdominal: Soft. Bowel sounds are normal.  No distension or tenderness  Musculoskeletal:  Contractures noted of both LE and UE  Skin: Skin is warm and dry. L toe nail place removed per podiatry      NEUROLOGICAL EXAMINATION:     MENTAL STATUS   Oriented to person.   Disoriented to place.   Disoriented to time.     CRANIAL NERVES     CN II   Visual acuity: (unable to accurately assess due to altered mental status)  Right visual field deficit: none  Left visual field deficit: none     CN III, IV, VI   Pupils are equal, round, and reactive to light.  Extraocular motions are normal.   CN III: no CN III palsy  CN VI: no CN VI palsy  Nystagmus: none     CN VII   Facial expression full, symmetric.     CN IX, X   CN IX normal.   CN X normal.     CN XII   CN XII normal.        Difficult to assess most cranial nerves as patient struggled to follow commands.  Patient notable for masked facies consistent with Parkinson disease.     MOTOR EXAM   Muscle bulk: normal  Overall muscle tone: increased  Right arm tone: cogwheel rigidity and increased  Left arm tone: cogwheel rigidity and increased    Strength   Right deltoid:  5/5  Left deltoid: 5/5  Right biceps: 5/5  Left biceps: 5/5  Right wrist flexion: 5/5  Left wrist flexion: 5/5  Right wrist extension: 5/5  Left wrist extension: 5/5  Right abdominals: 5/5  Left abdominals: 5/5       Lower limbs were difficult to assess because patient complained of pain during movement.     REFLEXES     Reflexes   Right brachioradialis: 2+  Left brachioradialis: 2+  Right biceps: 2+  Left biceps: 2+  Right triceps: 2+  Left triceps: 2+  Right patellar: 1+  Left patellar: 1+  Right achilles: 1+  Left achilles: 1+  Right : 2+  Left : 2+       Lower limbs difficult to assess because patient claimed of pain during exam.     SENSORY EXAM   Light touch normal.   Vibration normal.        Pinprick not tested     GAIT AND COORDINATION     Gait  Gait: (not assessed, patient bed-bound)     Coordination   Finger-nose-finger test: bradykinesia noted, coordination subpar.    Tremor   Resting tremor: slight resting tremor noticed right upper extremity.      Diagnostic Results:  CBC  Lab Results   Component Value Date    WBC 8.22 02/01/2018    HGB 8.4 (L) 02/01/2018    HCT 26.4 (L) 02/01/2018     (H) 02/01/2018     02/01/2018       BMP  Lab Results   Component Value Date     02/01/2018    K 3.9 02/01/2018     02/01/2018    CO2 24 02/01/2018    BUN 12 02/01/2018    CREATININE 0.7 02/01/2018    CALCIUM 8.5 (L) 02/01/2018    ANIONGAP 6 (L) 02/01/2018    ESTGFRAFRICA >60.0 02/01/2018    EGFRNONAA >60.0 02/01/2018       Micro  Microbiology Results (last 7 days)     Procedure Component Value Units Date/Time    Culture, Anaerobe [[id number]] Collected:  01/30/18 0831    Order Status:  Completed Specimen:  Wound from Foot, Left Updated:  01/31/18 1500     Anaerobic Culture Culture in progress    Aerobic culture [[id number]] Collected:  01/30/18 0831    Order Status:  Completed Specimen:  Wound from Foot, Left Updated:  01/31/18 1223     Aerobic Bacterial Culture No significant isolate to  date        Past Medical History:   Diagnosis Date    Anemia     Chronic back pain     Hypertension     Lumbago     Orthostatic hypotension dysautonomic syndrome     Parkinson disease     Renal disorder        Past Surgical History:   Procedure Laterality Date    BACK SURGERY  1961    ROTATOR CUFF REPAIR         Family History   Problem Relation Age of Onset    No Known Problems Mother     Cancer Father        Social History     Social History    Marital status:      Spouse name: N/A    Number of children: N/A    Years of education: N/A     Social History Main Topics    Smoking status: Former Smoker     Quit date: 8/25/1985    Smokeless tobacco: Never Used    Alcohol use 1.2 oz/week     2 Shots of liquor per week    Drug use: Unknown    Sexual activity: Yes     Partners: Female     Other Topics Concern    None     Social History Narrative    None       Current Facility-Administered Medications   Medication Dose Route Frequency Provider Last Rate Last Dose    acetaminophen tablet 650 mg  650 mg Oral Q8H PRN Jocelyn Bates MD   650 mg at 01/30/18 1511    apixaban tablet 5 mg  5 mg Oral BID Davon Barry MD   5 mg at 01/31/18 2151    ciprofloxacin (CIPRO)400mg/200ml D5W IVPB 400 mg  400 mg Intravenous Q12H Davon Barry  mL/hr at 01/31/18 2151 400 mg at 01/31/18 2151    dextrose 50% injection 12.5 g  12.5 g Intravenous PRN Jocelyn Bates MD        dextrose 50% injection 25 g  25 g Intravenous PRN Jocelyn Bates MD        glucagon (human recombinant) injection 1 mg  1 mg Intramuscular PRN Jocelyn Bates MD        glucose chewable tablet 16 g  16 g Oral PRN Jocelyn Bates MD        glucose chewable tablet 24 g  24 g Oral PRN Jocelyn Bates MD        neomycin-bacitracin-polymyxin ointment   Topical (Top) Daily Brie Lowery MD        ondansetron disintegrating tablet 8 mg  8 mg Oral Q8H PRN Jocelyn Bates MD        sodium chloride 0.9% flush 5 mL  5 mL  Intravenous PRN Jocelyn Bates MD           Review of patient's allergies indicates:  No Known Allergies    Imaging  X-RAY L foot 1/29/18:  No convincing acute osseous or erosive or destructive process to convincingly suggest infection although there is some erosive change involving the first metatarsophalangeal joint, suspected to be on the basis of degenerative change.  Correlation recommended, 3 phase bone scan to exclude infection as warranted.    Assessment     Tona Dey is a 86 y.o. male w/ pmh significant for Parkinson disease, HTN, PAD, DVT of L popliteal vein, and mild cognitive impairment presenting for pain in L big toe.  PT presented to hospital s/p fall and avulsion of L hallux.  Pt's wife primarily concerned with Parkinson progression.  Sinemet at home discontinued.  Notable for cogwheel rigidity, bradyphrenia, bradykinesia, slight resting tremor, and masked facies.      Plan     Parkinsonism, Advance   Mild Cognitive Impairment  - Stable, pt is bedbound, per wife Sinemet 25-100mg was stopped  - pt with LE contractures, painful when extending his LE ex bilat  - discuss with wife that this could be progression of PD       Autonomic Instability  - Stable  - Per wife, Midodrine 10mg PO TID (is more of PRN, she does not give it if SBP >130?)     Acute DVT of LLE Popliteal Vein  Lower Extremity DVT  - US LE 1/29 without new acute DVT  - Restart Eliqius as no procedures are planned     Sacral Wound  - Wound Care consult,  - Stage 2 pressure injury noted to patient's sacral spine, appears to be healing, epithelization noted.  - dressing with sacral foam dressing and change twice a week.    - Turn every 2hr          DVT PPX: home eliquis    Disposition: PT/OT eval, suspect home with home health vs SNF

## 2018-02-01 NOTE — PROGRESS NOTES
[facility] Hospital Medicine  Progress Note    Patient Name: Tona Dey  MRN: [id number]  Patient Class: IP- Inpatient   Admission Date: 1/29/2018  Length of Stay: 2 days  Attending Physician: Davon Barry MD  Primary Care Provider: Primary Doctor [facility] Medicine Team: [facility] A Davon Barry MD    Subjective:     Principal Problem:Osteomyelitis      HPI: Mr. Tona Dey is a 86 y.o. male with Parkinson's Disease, bed bound, autonomic instability, recent LLE DVT on Eliquis, and obesity who presents to the ED because of fevers and toe pain. He mentions that for about 2 weeks, he has been having difficulty walking because of toe pain.  He denies any numbness or tingling in his feet.  He does have chronic LE swelling.  He endorses some drainage from the wound at night, but denies any foul smelling odor.  He denies any trauma to the toe.  He got antibiotics from his PCP, but is unsure of which one.  Family reports fevers up to 101.1F at home with a cough.     Patient information was obtained from patient and ER records.       Hospital Course: Admitted for 1/29 for possible osteo left foot, ulceration left great toe. Unsure of how injury occurred to left great toenail. Reports pain upon palpation right great toe nail. Per chart review patient on abx for left great toe infection per PCP. Pt's wife has various concern including arthralgias, swelling. Podiatry consulted, s/p hallax nail removal.  No signs of bone infection at this time. Will hold off on further imaging and bone biopsy at this time. No acute surgical intervention indicated at this time. CHELE's not concerning for PAD.     On 1/31, pt with more somnolence mental status, he did get Ultram this AM. He was sitting up eating breakfast this AM. No sign of infection but given mental status, will add IV cipro for soft tissue coverage. Discuss with wife, who is report that he fully extend arms, legs and feed himself 2 days ago.  But even in ED and now patient is very stiff/ridgid and would complain of pain when we try to straighten legs and arms. Wife has unrealistic expectation with what appears to be Advance/severe Parkinson Disease. Will ask neurology for further rec for treatment (pt's wife report that he was taken off all medications). Stop pain medications, start IVF for hydration. PT/OT: recommend SNF.      Interval History:  More somnolence today, unclear etiology. Pt is arousable, he tolerated breakfast this AM. No new physical finding     Review of Systems   Limited due to mental status        Objective:     Vital Signs (Most Recent):  Temp: 98.1 °F (36.7 °C) (01/31/18 1713)  Pulse: 97 (01/31/18 1713)  Resp: 17 (01/31/18 1713)  BP: 130/62 (01/31/18 1713)  SpO2: 95 % (01/31/18 1713) Vital Signs (24h Range):  Temp:  [98 °F (36.7 °C)-99 °F (37.2 °C)] 98.1 °F (36.7 °C)  Pulse:  [77-97] 97  Resp:  [15-18] 17  SpO2:  [95 %-98 %] 95 %  BP: (123-149)/(61-76) 130/62     Weight: 98 kg (216 lb)  Body mass index is 33.83 kg/m².    Intake/Output Summary (Last 24 hours) at 01/31/18 1948  Last data filed at 01/31/18 1800   Gross per 24 hour   Intake              550 ml   Output                5 ml   Net              545 ml        Physical Exam  Constitutional: Elderly, frailed male, thin  Cardiovascular: Normal rate and regular rhythm.  No murmur heard.  Pulmonary/Chest: Effort normal and breath sounds normal. No respiratory distress. No wheezes, rales, or rhonchi  Abdominal: Soft. Bowel sounds are normal.  No distension or tenderness  Musculoskeletal:  Contractures noted of both LE and UE  Skin: Skin is warm and dry. L toe nail place removed per podiatry      Significant Labs:   CBC:     Recent Labs  Lab 01/30/18  0419 01/31/18  0726 01/31/18  1610   WBC 6.27 6.17 10.57   HGB 9.0* 9.2* 9.6*   HCT 27.9* 28.4* 28.8*    218 210     CMP:     Recent Labs  Lab 01/30/18  0419 01/31/18  0726 01/31/18  1610    139 138   K 4.1 4.7 5.2*   CL  107 110 110   CO2 22* 23 21*   * 104 133*   BUN 17 16 16   CREATININE 0.8 0.7 0.7   CALCIUM 8.8 8.6* 8.6*   PROT 6.5 6.2 6.2   ALBUMIN 2.7* 2.5* 2.5*   BILITOT 0.9 0.9 0.7   ALKPHOS 185* 143* 145*   AST 45* 60* 56*   ALT 23 96* 107*   ANIONGAP 8 6* 7*   EGFRNONAA >60.0 >60.0 >60.0       Significant Imaging: I have reviewed all pertinent imaging results/findings within the past 24 hours.    Assessment/Plan:      Active Diagnoses:    Diagnosis Date Noted POA    PRINCIPAL PROBLEM:  Osteomyelitis [M86.9] 01/29/2018 Yes    Pressure injury of skin, stage 2 [L89.92] 01/31/2018 Yes    Mild cognitive impairment [G31.84] 10/28/2017 Yes    Long term current use of anticoagulant therapy [Z79.01] 10/11/2017 Not Applicable    Parkinsonism [G20] 10/07/2017 Yes    Acute deep vein thrombosis (DVT) of popliteal vein of left lower extremity [I82.432] 10/05/2017 Yes    Lower extremity edema [R60.0] 10/02/2017 Yes    Autonomic instability [G90.9] 09/01/2017 Yes     Chronic    Obesity (BMI 30-39.9) [E66.9] 01/27/2016 Unknown      Problems Resolved During this Admission:    Diagnosis Date Noted Date Resolved POA     VTE Risk Mitigation         Ordered     apixaban tablet 5 mg  2 times daily     Route:  Oral        01/30/18 1047          Osteomyelitis of Left First Toe  - XR with concern for erosive changes of the left first metatarsalphalangeal joint  - ESR 58 and CRP 14 without fevers or leukocytosis  - Podiatry: s/p left hallux nail removal (1/30).    - Aerobic, anaerobic cultures pending.   - Dressed with betadine and mepilex border.  - Heel protector boots to be worn  - No acute surgical intervention indicated at this time.  - CHELE's not concerning for PAD.  - Given change in mental status, start IV cipro to cover soft tissue and OM    AMS  - unclear etiology, possibly due to pain medication?  - stop all pain medication, per nursing he is moaning and complaining of pain this afternoon  - on my eval this AM, he was  tolerating a diet with assistance  - will start ABX, IVF started  - SLP eval for speech    Parkinsonism, Advance   Mild Cognitive Impairment  - Stable, pt is bedbound, per wife Sinemet 25-100mg was stopped  - pt with LE contractures, painful when extending his LE ex bilat  - discuss with wife that this could be progression of PD  - wife seam to not get the prognosis even though this physician tried to explain it to her  - will ask neuro for further advice for treatment and to help discuss with wife about the prognosis     Autonomic Instability  - Stable  - Per wife, Midodrine 10mg PO TID (is more of PRN, she does not give it if SBP >130?)    Acute DVT of LLE Popliteal Vein  Lower Extremity DVT  - US LE 1/29 without new acute DVT  - Restart Eliqius as no procedures are planned    Sacral Wound  - Wound Care consult,  - Stage 2 pressure injury noted to patient's sacral spine, appears to be healing, epithelization noted.  - dressing with sacral foam dressing and change twice a week.    - Turn every 2hr       Diet:  Adult puree  DVT PPx:  Home Eliquis  Goals of Care:  Full Code     Disposition: PT/OT eval, suspect home with home health vs SNF       Davon Barry MD  Department of Hospital Medicine   [facility]

## 2018-02-01 NOTE — CONSULTS
[facility]  Neurology  Consult Note    Patient Name: Tona Dey  MRN: [id number]  Admission Date: 1/29/2018  Hospital Length of Stay: 3 days  Code Status: Full Code   Attending Provider: Davon Barry MD   Consulting Provider: Akila Marroquin MD  Primary Care Physician: Primary Doctor No  Principal Problem:Osteomyelitis    Consults   Subjective:     Chief Complaint:  Parkinsonism, pain     HPI:   Patient is a 86 y.o. male w/ past medical history significant for Parkinsonism (no neurology follow up), bed bound, autonomic instability, recent LLE DVT on Eliquis, and obesity who presents to the ED because of fevers and toe pain: evaluated by podiatry and is now s/p toenail removal, currently on clindamycin for soft-tissue infection after osteomyelitis was ruled out. Neurology has been consulted for co-management of patient's pain and evaluation of his neurological symptoms.  Patient was admitted to [facility] in October 2017 for evaluation of SOB and leg pain and also experienced an episode of AMS, when he was evaluated by  and was noted to have parkinsonian symptoms on the exam. At the time he was started on Sinemet 23/100 TID and an a follow up in Movement Disorder clinic was made, which patient was unable to attend. Per wife patient did not tolerate Sinemet well, having periods of increased agitation as well as body spasms and on PCP evaluation the medication was discontinued.   Per wife, patient also started to experience whole body pains/joint aches and she correlates these symptoms with the start of Eliquis. Patient also reports some shooting pains down his legs and arms, but unable to classify them further.  Patient denies SOB, Cp, changes in his vision, headaches, unintentional weight loss.      Past Medical History:   Diagnosis Date    Anemia     Chronic back pain     Hypertension     Lumbago     Orthostatic hypotension dysautonomic syndrome     Parkinson disease     Renal  disorder        Past Surgical History:   Procedure Laterality Date    BACK SURGERY  1961    ROTATOR CUFF REPAIR         Review of patient's allergies indicates:  No Known Allergies    Current Neurological Medications: N/A    No current facility-administered medications on file prior to encounter.      Current Outpatient Prescriptions on File Prior to Encounter   Medication Sig    acetaminophen (TYLENOL) 325 MG tablet Take 2 tablets (650 mg total) by mouth every 4 (four) hours as needed for Pain.    midodrine (PROAMATINE) 10 MG tablet Take 0.5 tablets (5 mg total) by mouth 3 (three) times daily. (Patient taking differently: Take 10 mg by mouth 3 (three) times daily. )    traMADol (ULTRAM) 50 mg tablet Take 50 mg by mouth every 12 (twelve) hours as needed for Pain.    carbidopa-levodopa  mg (SINEMET)  mg per tablet Take 1 tablet by mouth 3 (three) times daily.    magnesium hydroxide 400 mg/5 ml (MILK OF MAGNESIA) 400 mg/5 mL Susp Take 30 mLs by mouth daily as needed (constipation).    ranitidine (ZANTAC) 300 MG tablet Take 300 mg by mouth daily as needed for Heartburn.     Family History     Problem Relation (Age of Onset)    Cancer Father    No Known Problems Mother        Social History Main Topics    Smoking status: Former Smoker     Quit date: 8/25/1985    Smokeless tobacco: Never Used    Alcohol use 1.2 oz/week     2 Shots of liquor per week    Drug use: Unknown    Sexual activity: Yes     Partners: Female     Review of Systems   Constitutional: Positive for fatigue. Negative for fever.   HENT: Negative.    Eyes: Negative for photophobia and visual disturbance.   Respiratory: Negative.    Cardiovascular: Positive for leg swelling. Negative for palpitations.   Gastrointestinal: Negative.    Endocrine: Negative.    Genitourinary: Negative.    Musculoskeletal: Positive for arthralgias.   Skin: Positive for wound. Rash: L great toe.   Allergic/Immunologic: Negative.    Neurological: Positive  for speech difficulty and weakness. Negative for tremors, seizures, syncope, light-headedness, numbness and headaches.   Psychiatric/Behavioral: Positive for confusion.     Objective:     Vital Signs (Most Recent):  Temp: 98.2 °F (36.8 °C) (02/01/18 1219)  Pulse: 91 (02/01/18 1219)  Resp: 19 (02/01/18 1219)  BP: (!) 111/54 (02/01/18 1219)  SpO2: 96 % (02/01/18 1219) Vital Signs (24h Range):  Temp:  [98.1 °F (36.7 °C)-98.8 °F (37.1 °C)] 98.2 °F (36.8 °C)  Pulse:  [85-97] 91  Resp:  [17-19] 19  SpO2:  [93 %-98 %] 96 %  BP: (111-130)/(54-68) 111/54     Weight: 98 kg (216 lb)  Body mass index is 33.83 kg/m².    Physical Exam   General:  Well-developed, obese. NAD  HEENT:  NCAT, PERRLA, EOMI, oropharyngeal membranes non-erythematous/without exudate, hypomimia  Neck:  Supple, normal ROM without nuchal rigidity  Resp:  Symmetric expansion, no increased wob  CVS: LE edema/ non-pitting b/l, peripheral pulses 2+ (radial, dorsalis pedis)  GI:  Abd soft, non-distended, non-tender to palpation  Mental Status:  Awake, alert, oriented to self and location. Speech hypophonic and at times unclear, thought content appropriate.  Able to spell 'world' forward.   Cranial Nerves:  PERRLA, EOMI.  Facial movements minimal, sensation intact and symmetric.  Palate raises symmetrically, tongue protrudes midline.  Trapezius strength 5/5 bilaterally.  Motor:  Normal bulk and increased tone w/ BUE and BLE rigidity.  BUE shoulder abduction 4+/5, biceps/triceps 4+/5, wrist flexion/extension 4+/5,  strength 4+/5.  RLE hip flexors 3-/5, knee flexion/extension3-/5, plantarflexion/dorsiflexion 3-/5 and 2+/5 in LLE .  No pronator drift.  Sensory:  Intact to light touch without inattention.  Vibratory sensation intact at BUE digits, BLE lower shin.  Reflexes:  Biceps, brachioradialis, patellar, Achilles 2+ and symmetric.  No ankle clonus.   Coordination: No dysmetria/ataxia/dysdiadochokinesia noted. No noted tremor at rest/action. Marked  bradykinesia.  Gait:  Deferred gait exam this am. Per PT, no improvement s/p LP 01/30/18.            Significant Labs:   Hemoglobin A1c: No results for input(s): HGBA1C in the last 720 hours.  CBC:   Recent Labs  Lab 01/31/18  0726 01/31/18  1610 02/01/18  0450   WBC 6.17 10.57 8.22   HGB 9.2* 9.6* 8.4*   HCT 28.4* 28.8* 26.4*    210 220     CMP:   Recent Labs  Lab 01/31/18  0726 01/31/18  1610 02/01/18  0450    133* 123*    138 139   K 4.7 5.2* 3.9    110 109   CO2 23 21* 24   BUN 16 16 12   CREATININE 0.7 0.7 0.7   CALCIUM 8.6* 8.6* 8.5*   MG 1.8  --  1.6   PROT 6.2 6.2 5.8*   ALBUMIN 2.5* 2.5* 2.4*   BILITOT 0.9 0.7 0.9   ALKPHOS 143* 145* 122   AST 60* 56* 46*   ALT 96* 107* 94*   ANIONGAP 6* 7* 6*   EGFRNONAA >60.0 >60.0 >60.0     Inflammatory Markers:   Recent Labs  Lab 01/31/18  0726   PROCAL 0.04     POCT Glucose:   Recent Labs  Lab 01/31/18  1714 01/31/18  2150   POCTGLUCOSE 130* 121*     Respiratory Culture: No results for input(s): GSRESP, RESPIRATORYC in the last 48 hours.  All pertinent lab results from the past 24 hours have been reviewed.    Significant Imaging: I have reviewed all pertinent imaging results/findings within the past 24 hours.    Assessment and Plan:     Parkinsonism    - Patient initially noted to have parkinsonian symptoms on examination during admission in October; was started on Sinemet TID ( 25/100) but due to intolerance stopped the medication at home  - Movement Disorder Clinic appointment to establish care was cancelled by the patient  - Wife would not like to re-start the Sinemet at this time  - Recommend to start Gabapentin 300 TID for current pain management; can start w/ 300 QHS to assess how well patient tolerates  - Follow up w/ Movement Disorder Clinic is recommended upon discharge         Arthralgia    - Patient states that since the initiation of Eliquis he has experienced diffuse arthralgias; 2/2 this wife has only been giving patient  medication QD rather than prescribed BID dose   - This is listed as a known side effect   - Would consider switching to another NOAC at this time             VTE Risk Mitigation         Ordered     apixaban tablet 5 mg  2 times daily     Route:  Oral        01/30/18 1921          Thank you for your consult. I will follow-up with patient. Please contact us if you have any additional questions.    Akila Marroquin MD  Neurology  [facility]

## 2018-02-01 NOTE — HPI
Patient is a 86 y.o. male w/ past medical history significant for Parkinsonism (no neurology follow up), bed bound, autonomic instability, recent LLE DVT on Eliquis, and obesity who presents to the ED because of fevers and toe pain: evaluated by podiatry and is now s/p toenail removal, currently on clindamycin for soft-tissue infection after osteomyelitis was ruled out. Neurology has been consulted for co-management of patient's pain and evaluation of his neurological symptoms.  Patient was admitted to [facility] in October 2017 for evaluation of SOB and leg pain and also experienced an episode of AMS, when he was evaluated by  and was noted to have parkinsonian symptoms on the exam. At the time he was started on Sinemet 23/100 TID and an a follow up in Movement Disorder clinic was made, which patient was unable to attend. Per wife patient did not tolerate Sinemet well, having periods of increased agitation as well as body spasms and on PCP evaluation the medication was discontinued.   Per wife, patient also started to experience whole body pains/joint aches and she correlates these symptoms with the start of Eliquis. Patient also reports some shooting pains down his legs and arms, but unable to classify them further.  Patient denies SOB, Cp, changes in his vision, headaches, unintentional weight loss.

## 2018-02-01 NOTE — PLAN OF CARE
Problem: Patient Care Overview  Goal: Plan of Care Review  Outcome: Ongoing (interventions implemented as appropriate)  Patient alert and disoriented except to self, opens eyes spontaneously, speaks occasionally and able to follow simple commands. VS stable. Patient's wife here and stated that this patient was able to fully extend arms, legs and feed himself 2 days ago. Patient is very stiff, unable to do anything for himself and unable to straighten legs and arms.CBG<200 today, wound care came and evaluated stage 2 on coccyx and orders were written. Patient takes his meds crushed in applesauce and has no signs or symptoms of aspiration. Wife requesting neuro consult and Dr. Barry notified, IVF initiated as ordered. In no acute distress; will continue to monitor.

## 2018-02-01 NOTE — SUBJECTIVE & OBJECTIVE
Past Medical History:   Diagnosis Date    Anemia     Chronic back pain     Hypertension     Lumbago     Orthostatic hypotension dysautonomic syndrome     Parkinson disease     Renal disorder        Past Surgical History:   Procedure Laterality Date    BACK SURGERY  1961    ROTATOR CUFF REPAIR         Review of patient's allergies indicates:  No Known Allergies    Current Neurological Medications: N/A    No current facility-administered medications on file prior to encounter.      Current Outpatient Prescriptions on File Prior to Encounter   Medication Sig    acetaminophen (TYLENOL) 325 MG tablet Take 2 tablets (650 mg total) by mouth every 4 (four) hours as needed for Pain.    midodrine (PROAMATINE) 10 MG tablet Take 0.5 tablets (5 mg total) by mouth 3 (three) times daily. (Patient taking differently: Take 10 mg by mouth 3 (three) times daily. )    traMADol (ULTRAM) 50 mg tablet Take 50 mg by mouth every 12 (twelve) hours as needed for Pain.    carbidopa-levodopa  mg (SINEMET)  mg per tablet Take 1 tablet by mouth 3 (three) times daily.    magnesium hydroxide 400 mg/5 ml (MILK OF MAGNESIA) 400 mg/5 mL Susp Take 30 mLs by mouth daily as needed (constipation).    ranitidine (ZANTAC) 300 MG tablet Take 300 mg by mouth daily as needed for Heartburn.     Family History     Problem Relation (Age of Onset)    Cancer Father    No Known Problems Mother        Social History Main Topics    Smoking status: Former Smoker     Quit date: 8/25/1985    Smokeless tobacco: Never Used    Alcohol use 1.2 oz/week     2 Shots of liquor per week    Drug use: Unknown    Sexual activity: Yes     Partners: Female     Review of Systems   Constitutional: Positive for fatigue. Negative for fever.   HENT: Negative.    Eyes: Negative for photophobia and visual disturbance.   Respiratory: Negative.    Cardiovascular: Positive for leg swelling. Negative for palpitations.   Gastrointestinal: Negative.    Endocrine:  Negative.    Genitourinary: Negative.    Musculoskeletal: Positive for arthralgias.   Skin: Positive for wound. Rash: L great toe.   Allergic/Immunologic: Negative.    Neurological: Positive for speech difficulty and weakness. Negative for tremors, seizures, syncope, light-headedness, numbness and headaches.   Psychiatric/Behavioral: Positive for confusion.     Objective:     Vital Signs (Most Recent):  Temp: 98.2 °F (36.8 °C) (02/01/18 1219)  Pulse: 91 (02/01/18 1219)  Resp: 19 (02/01/18 1219)  BP: (!) 111/54 (02/01/18 1219)  SpO2: 96 % (02/01/18 1219) Vital Signs (24h Range):  Temp:  [98.1 °F (36.7 °C)-98.8 °F (37.1 °C)] 98.2 °F (36.8 °C)  Pulse:  [85-97] 91  Resp:  [17-19] 19  SpO2:  [93 %-98 %] 96 %  BP: (111-130)/(54-68) 111/54     Weight: 98 kg (216 lb)  Body mass index is 33.83 kg/m².    Physical Exam   General:  Well-developed, obese. NAD  HEENT:  NCAT, PERRLA, EOMI, oropharyngeal membranes non-erythematous/without exudate, hypomimia  Neck:  Supple, normal ROM without nuchal rigidity  Resp:  Symmetric expansion, no increased wob  CVS: LE edema/ non-pitting b/l, peripheral pulses 2+ (radial, dorsalis pedis)  GI:  Abd soft, non-distended, non-tender to palpation  Mental Status:  Awake, alert, oriented to self and location. Speech hypophonic and at times unclear, thought content appropriate.  Able to spell 'world' forward.   Cranial Nerves:  PERRLA, EOMI.  Facial movements minimal, sensation intact and symmetric.  Palate raises symmetrically, tongue protrudes midline.  Trapezius strength 5/5 bilaterally.  Motor:  Normal bulk and increased tone w/ BUE and BLE rigidity.  BUE shoulder abduction 4+/5, biceps/triceps 4+/5, wrist flexion/extension 4+/5,  strength 4+/5.  RLE hip flexors 3-/5, knee flexion/extension3-/5, plantarflexion/dorsiflexion 3-/5 and 2+/5 in LLE .  No pronator drift.  Sensory:  Intact to light touch without inattention.  Vibratory sensation intact at BUE digits, BLE lower shin.  Reflexes:   Biceps, brachioradialis, patellar, Achilles 2+ and symmetric.  No ankle clonus.   Coordination: No dysmetria/ataxia/dysdiadochokinesia noted. No noted tremor at rest/action. Marked bradykinesia.  Gait:  Deferred gait exam this am. Per PT, no improvement s/p LP 01/30/18.            Significant Labs:   Hemoglobin A1c: No results for input(s): HGBA1C in the last 720 hours.  CBC:   Recent Labs  Lab 01/31/18  0726 01/31/18  1610 02/01/18  0450   WBC 6.17 10.57 8.22   HGB 9.2* 9.6* 8.4*   HCT 28.4* 28.8* 26.4*    210 220     CMP:   Recent Labs  Lab 01/31/18  0726 01/31/18  1610 02/01/18  0450    133* 123*    138 139   K 4.7 5.2* 3.9    110 109   CO2 23 21* 24   BUN 16 16 12   CREATININE 0.7 0.7 0.7   CALCIUM 8.6* 8.6* 8.5*   MG 1.8  --  1.6   PROT 6.2 6.2 5.8*   ALBUMIN 2.5* 2.5* 2.4*   BILITOT 0.9 0.7 0.9   ALKPHOS 143* 145* 122   AST 60* 56* 46*   ALT 96* 107* 94*   ANIONGAP 6* 7* 6*   EGFRNONAA >60.0 >60.0 >60.0     Inflammatory Markers:   Recent Labs  Lab 01/31/18  0726   PROCAL 0.04     POCT Glucose:   Recent Labs  Lab 01/31/18  1714 01/31/18  2150   POCTGLUCOSE 130* 121*     Respiratory Culture: No results for input(s): GSRESP, RESPIRATORYC in the last 48 hours.  All pertinent lab results from the past 24 hours have been reviewed.    Significant Imaging: I have reviewed all pertinent imaging results/findings within the past 24 hours.

## 2018-02-01 NOTE — PLAN OF CARE
3:35 PM  SW received notification that pt will need SNF at discharge. Spoke with pt's spouse who stated she would like 1. [facility] 2. [facility]. Faxed referrals to facilities. Will f/u as needed.    Susan Rosado LMSW   [facility]  Ext [phone]

## 2018-02-01 NOTE — PLAN OF CARE
Problem: Patient Care Overview  Goal: Plan of Care Review  Outcome: Ongoing (interventions implemented as appropriate)  Pt alert and oriented to self only, delayed responses to questions noted, pt able to follow simple commands. Pt remains stiff and unable to fully extend both arms and legs. Dressing to sacrum ulcer intact. Pt turned and repositioned q2h as ordered. Pt incontinent for urine. Pt free of injury or falls.

## 2018-02-02 LAB
ALBUMIN SERPL BCP-MCNC: 2.2 G/DL
ALP SERPL-CCNC: 105 U/L
ALT SERPL W/O P-5'-P-CCNC: 67 U/L
ANION GAP SERPL CALC-SCNC: 7 MMOL/L
AST SERPL-CCNC: 36 U/L
BASOPHILS # BLD AUTO: 0.02 K/UL
BASOPHILS NFR BLD: 0.3 %
BILIRUB SERPL-MCNC: 0.7 MG/DL
BUN SERPL-MCNC: 13 MG/DL
CALCIUM SERPL-MCNC: 8.6 MG/DL
CHLORIDE SERPL-SCNC: 111 MMOL/L
CO2 SERPL-SCNC: 22 MMOL/L
CREAT SERPL-MCNC: 0.6 MG/DL
DIFFERENTIAL METHOD: ABNORMAL
EOSINOPHIL # BLD AUTO: 0.2 K/UL
EOSINOPHIL NFR BLD: 2.9 %
ERYTHROCYTE [DISTWIDTH] IN BLOOD BY AUTOMATED COUNT: 13.9 %
EST. GFR  (AFRICAN AMERICAN): >60 ML/MIN/1.73 M^2
EST. GFR  (NON AFRICAN AMERICAN): >60 ML/MIN/1.73 M^2
GLUCOSE SERPL-MCNC: 97 MG/DL
HCT VFR BLD AUTO: 25.4 %
HGB BLD-MCNC: 8.1 G/DL
IMM GRANULOCYTES # BLD AUTO: 0.04 K/UL
IMM GRANULOCYTES NFR BLD AUTO: 0.7 %
LYMPHOCYTES # BLD AUTO: 2.2 K/UL
LYMPHOCYTES NFR BLD: 35.6 %
MAGNESIUM SERPL-MCNC: 1.7 MG/DL
MCH RBC QN AUTO: 31.5 PG
MCHC RBC AUTO-ENTMCNC: 31.9 G/DL
MCV RBC AUTO: 99 FL
MONOCYTES # BLD AUTO: 0.5 K/UL
MONOCYTES NFR BLD: 8.5 %
NEUTROPHILS # BLD AUTO: 3.2 K/UL
NEUTROPHILS NFR BLD: 52 %
NRBC BLD-RTO: 0 /100 WBC
PHOSPHATE SERPL-MCNC: 2.8 MG/DL
PLATELET # BLD AUTO: 214 K/UL
PMV BLD AUTO: 10 FL
POTASSIUM SERPL-SCNC: 3.6 MMOL/L
PROT SERPL-MCNC: 5.4 G/DL
RBC # BLD AUTO: 2.57 M/UL
SODIUM SERPL-SCNC: 140 MMOL/L
WBC # BLD AUTO: 6.15 K/UL

## 2018-02-02 PROCEDURE — 36415 COLL VENOUS BLD VENIPUNCTURE: CPT

## 2018-02-02 PROCEDURE — 85025 COMPLETE CBC W/AUTO DIFF WBC: CPT

## 2018-02-02 PROCEDURE — 97530 THERAPEUTIC ACTIVITIES: CPT

## 2018-02-02 PROCEDURE — 92526 ORAL FUNCTION THERAPY: CPT

## 2018-02-02 PROCEDURE — 25000003 PHARM REV CODE 250: Performed by: HOSPITALIST

## 2018-02-02 PROCEDURE — 25000003 PHARM REV CODE 250: Performed by: PODIATRIST

## 2018-02-02 PROCEDURE — 83735 ASSAY OF MAGNESIUM: CPT

## 2018-02-02 PROCEDURE — 80053 COMPREHEN METABOLIC PANEL: CPT

## 2018-02-02 PROCEDURE — 11000001 HC ACUTE MED/SURG PRIVATE ROOM

## 2018-02-02 PROCEDURE — 84100 ASSAY OF PHOSPHORUS: CPT

## 2018-02-02 PROCEDURE — 99232 SBSQ HOSP IP/OBS MODERATE 35: CPT | Mod: ,,, | Performed by: HOSPITALIST

## 2018-02-02 PROCEDURE — 63600175 PHARM REV CODE 636 W HCPCS: Performed by: HOSPITALIST

## 2018-02-02 PROCEDURE — 97535 SELF CARE MNGMENT TRAINING: CPT

## 2018-02-02 PROCEDURE — 99232 SBSQ HOSP IP/OBS MODERATE 35: CPT | Mod: ,,, | Performed by: PSYCHIATRY & NEUROLOGY

## 2018-02-02 RX ORDER — MIDODRINE HYDROCHLORIDE 10 MG/1
10 TABLET ORAL 3 TIMES DAILY PRN
Start: 2018-02-02 | End: 2019-02-02

## 2018-02-02 RX ORDER — GABAPENTIN 100 MG/1
100 CAPSULE ORAL NIGHTLY
Qty: 30 CAPSULE | Refills: 11
Start: 2018-02-02 | End: 2018-02-05

## 2018-02-02 RX ORDER — LIDOCAINE 50 MG/G
2 PATCH TOPICAL DAILY
Refills: 0
Start: 2018-02-02 | End: 2018-02-05

## 2018-02-02 RX ORDER — CIPROFLOXACIN 500 MG/1
500 TABLET ORAL EVERY 12 HOURS
Start: 2018-02-02 | End: 2018-02-05

## 2018-02-02 RX ORDER — CIPROFLOXACIN 250 MG/1
500 TABLET, FILM COATED ORAL EVERY 12 HOURS
Status: DISCONTINUED | OUTPATIENT
Start: 2018-02-02 | End: 2018-02-06 | Stop reason: HOSPADM

## 2018-02-02 RX ADMIN — BACITRACIN ZINC NEOMYCIN SULFATE POLYMYXIN B SULFATE: 400; 3.5; 5 OINTMENT TOPICAL at 09:02

## 2018-02-02 RX ADMIN — GABAPENTIN 100 MG: 100 CAPSULE ORAL at 08:02

## 2018-02-02 RX ADMIN — ACETAMINOPHEN 650 MG: 325 TABLET, FILM COATED ORAL at 07:02

## 2018-02-02 RX ADMIN — CIPROFLOXACIN 400 MG: 2 INJECTION, SOLUTION INTRAVENOUS at 12:02

## 2018-02-02 RX ADMIN — RIVAROXABAN 10 MG: 10 TABLET, FILM COATED ORAL at 05:02

## 2018-02-02 RX ADMIN — LIDOCAINE 2 PATCH: 50 PATCH TOPICAL at 02:02

## 2018-02-02 RX ADMIN — CIPROFLOXACIN HYDROCHLORIDE 500 MG: 250 TABLET, FILM COATED ORAL at 08:02

## 2018-02-02 RX ADMIN — KETOROLAC TROMETHAMINE 15 MG: 15 INJECTION, SOLUTION INTRAMUSCULAR; INTRAVENOUS at 02:02

## 2018-02-02 RX ADMIN — GABAPENTIN 100 MG: 100 CAPSULE ORAL at 12:02

## 2018-02-02 NOTE — SIGNIFICANT EVENT
"This afternoon, I had a conversation with Ms Alem Dey, pts wife. She is concern about his ongoing leg pain that worsen with movement as pt with increased tone w/ BUE and BLE rigidity. Patient's wife said that she would prefer that he is comfortable. And that we should shift focus to comfort (pain control) more than aggressive treatment. I asked for clarification, she said that they discussed previous that Mr Dey would not chest compression, intubation. He would also not want PEG tube or artifical nutrition. She is more focus on his comfort at this time.     Chart review showed recent admission 11/2017, pt was DNR/DNI at that time. Will change code status after discussion DNR/DNI    Ms Dey stated that he previous did not "qualify" for ?home hospice but she would like for re-evaluation as now he is bedbound and condition is according to her worsen. I will discuss with CM/SW to see if he would qualify for hospice vs possible CARE program or SNF that will transition to hospice if he deteriorate further. Ms Dey is in agreement of this plan.    Pain: start pt on lidocaine patch, appreciate neuro recommedations. Continue to hold ultram as pt was very somnolence with it, wife is ok with it being stopped as well.    Signing Physician   Davon Barry MD  Hospital Medicine Staff  Department of Hospital Medicine   [facility]  2/1/2018      "

## 2018-02-02 NOTE — PLAN OF CARE
02/02/18 0909   Right Care Assessment   Can the patient answer the patient profile reliably? (CURT Davis spoke with wife yesterday and obtained preferences to SNF. CURT Davis requested PPD this am.)   How often would a person be available to care for the patient? Whenever needed   Describe the patient's ability to walk at the present time. Does not walk or unable to take any steps at all   How does the patient rate their overall health at the present time? Fair   Number of comorbid conditions (as recorded on the chart) Five or more   During the past month, has the patient often been bothered by feeling down, depressed or hopeless? No   During the past month, has the patient often been bothered by little interest or pleasure in doing things? No

## 2018-02-02 NOTE — PLAN OF CARE
2:28 PM  SW received notification from Jannette with [facility] who stated pt's SNF authorization will have to go to medical review and decision will not be made until Monday. Informed Fauzia with [facility], spouse, physician and CM.      Susan Rosado, LMSW [facility]  Ext [phone]

## 2018-02-02 NOTE — SUBJECTIVE & OBJECTIVE
"Interval Hx:  Patient s/p left hallux avulsion.  Pt relates nail from 2nd toe fell off and states he "bumped it on equipment during his physical therapy session". Relates to mild pain. No other pedal complaints at this time.     Scheduled Meds:   ciprofloxacin HCl  500 mg Oral Q12H    gabapentin  100 mg Oral QHS    lidocaine  2 patch Transdermal Q24H    neomycin-bacitracin-polymyxin   Topical (Top) Daily    rivaroxaban  10 mg Oral Daily with dinner    tuberculin  5 Units Intradermal Once     Continuous Infusions:    PRN Meds:acetaminophen, dextrose 50%, dextrose 50%, glucagon (human recombinant), glucose, glucose, ketorolac, ondansetron, sodium chloride 0.9%    Review of patient's allergies indicates:  No Known Allergies     Past Medical History:   Diagnosis Date    Anemia     Chronic back pain     Hypertension     Lumbago     Orthostatic hypotension dysautonomic syndrome     Parkinson disease     Renal disorder      Past Surgical History:   Procedure Laterality Date    BACK SURGERY  1961    ROTATOR CUFF REPAIR         Family History     Problem Relation (Age of Onset)    Cancer Father    No Known Problems Mother        Social History Main Topics    Smoking status: Former Smoker     Quit date: 8/25/1985    Smokeless tobacco: Never Used    Alcohol use 1.2 oz/week     2 Shots of liquor per week    Drug use: Unknown    Sexual activity: Yes     Partners: Female     Review of Systems   Constitutional: Positive for activity change.   Respiratory: Negative for shortness of breath.    Cardiovascular: Negative for chest pain and leg swelling.   Gastrointestinal: Negative for nausea and vomiting.   Genitourinary: Negative.    Musculoskeletal: Positive for arthralgias and myalgias.   Skin: Positive for color change.   Neurological: Positive for numbness. Negative for weakness.   Psychiatric/Behavioral: Negative.      Objective:     Vital Signs (Most Recent):  Temp: 98.2 °F (36.8 °C) (02/02/18 1203)  Pulse: " 83 (02/02/18 1203)  Resp: 17 (02/02/18 1203)  BP: 127/70 (02/02/18 1203)  SpO2: (!) 92 % (02/02/18 1203) Vital Signs (24h Range):  Temp:  [98 °F (36.7 °C)-98.9 °F (37.2 °C)] 98.2 °F (36.8 °C)  Pulse:  [83-88] 83  Resp:  [15-20] 17  SpO2:  [92 %-98 %] 92 %  BP: (101-127)/(60-70) 127/70     Weight: 98 kg (216 lb)  Body mass index is 33.83 kg/m².    Foot Exam    General  Affect: appropriate       Right Foot/Ankle     Inspection and Palpation  Tenderness: none   Swelling: none   Skin Exam: skin intact;     Neurovascular  Dorsalis pedis: 1+  Posterior tibial: 1+  Saphenous nerve sensation: diminished  Tibial nerve sensation: diminished  Superficial peroneal nerve sensation: diminished  Deep peroneal nerve sensation: diminished  Sural nerve sensation: diminished      Left Foot/Ankle      Inspection and Palpation  Tenderness: great toe interphalangeal joint   Swelling: great toe metatarsophalangeal joint   Skin Exam: drainage, skin changes and abnormal color;     Neurovascular  Dorsalis pedis: 1+  Posterior tibial: 1+  Saphenous nerve sensation: diminished  Tibial nerve sensation: diminished  Superficial peroneal nerve sensation: diminished  Deep peroneal nerve sensation: diminished  Sural nerve sensation: diminished    Comments  Sanguinous drainage with mild purulence noted surrounding draining from proximal medial aspect of wound bed.     2nd toe nail avulsed with granular nail bed, no SOI noted, stable.   Left heel decubitus ulceration                    Laboratory:  CBC:     Recent Labs  Lab 02/02/18  0359   WBC 6.15   RBC 2.57*   HGB 8.1*   HCT 25.4*      MCV 99*   MCH 31.5*   MCHC 31.9*     CMP:     Recent Labs  Lab 02/02/18  0359   GLU 97   CALCIUM 8.6*   ALBUMIN 2.2*   PROT 5.4*      K 3.6   CO2 22*   *   BUN 13   CREATININE 0.6   ALKPHOS 105   ALT 67*   AST 36   BILITOT 0.7       Diagnostic Results:  CHELE:  Impression:   Right Leg: CHELE ( 1.21) and PVR waveforms suggest no evidence of  PAOD.    Left Leg: CHELE ( 1.15) and PVR waveforms suggest no evidence of PAOD.    X-Ray:No convincing acute osseous or erosive or destructive process to convincingly suggest infection although there is some erosive change involving the first metatarsophalangeal joint, suspected to be on the basis of degenerative change.  Correlation recommended, 3 phase bone scan to exclude infection as warranted.    Clinical Findings:  S/p hallax nail removal, dried hematoma with mild purulent drainage drainage, erythema.  S/p 2nd toe nail avulsion, no SOI, stable.

## 2018-02-02 NOTE — PLAN OF CARE
Problem: Physical Therapy Goal  Goal: Physical Therapy Goal  Goals to be met by: 18     Patient will increase functional independence with mobility by performin. Supine to sit with Moderate Assistance  2. Sit to supine with Moderate Assistance  3. Rolling to Left and Right with Moderate Assistance. -Met                  Revised: Min A  4. Sitting at edge of bed x5 minutes with Minimal Assistance-Met                  Revised: x10 Min CGA  5. Lower extremity exercise program focusing on PROM  *Sit>Stand Goal and Gait goal to be added when/if appropriate       Outcome: Ongoing (interventions implemented as appropriate)  PT Goals remain appropriate; continue POC as established    Jamal Gil, PT  2018

## 2018-02-02 NOTE — PLAN OF CARE
12:08 PM  SW received notification from [facility] stating they cannot take pt. Informed Fauzia with [facility] we are looking for placement. Fauzia stated it was still under review. Faxed orders to Fauzia and left voicemail. Will f/u as needed.  12:19 PM  ENDY received call from Fauzia with [facility] who stated pt has been accepted. Fauzia stated she has two other admits before pt but will get to him when she can.     Susan Rosado, DAX   [facility]  Ext [phone]

## 2018-02-02 NOTE — PLAN OF CARE
Problem: Patient Care Overview  Goal: Plan of Care Review  Pt free from falls.Pt bed low and locked position. Pt Max tempthis shift. Pt IV infiltrated picc line team consulted for midline or IV.   Pt has denied any pain or discomfort this shift.

## 2018-02-02 NOTE — PT/OT/SLP PROGRESS
Physical Therapy Treatment    Patient Name:  Tona Dey   MRN:  [id number]    Recommendations:     Discharge Recommendations:  nursing facility, skilled   Discharge Equipment Recommendations: none   Barriers to discharge: Decreased caregiver support         Tona Dey is a 86 y.o. male admitted with a medical diagnosis of Osteomyelitis.  He presents with the following impairments/functional limitations:  impaired endurance, impaired self care skills, impaired functional mobilty, gait instability, impaired balance, decreased coordination, decreased upper extremity function, decreased lower extremity function. Patient is more alert during session and shows improvements in functional mobility and participation. Patient still requires increased assistance for bed mobility, transfers and attention to task. Patient tolerated therapy session well and will continue to benefit from skilled PT services.     Rehab Prognosis:  Fair; patient would benefit from acute skilled PT services to address these deficits and reach maximum level of function.      Recent Surgery: * No surgery found *      Plan:     During this hospitalization, patient to be seen 3 x/week to address the above listed problems via therapeutic activities, therapeutic exercises, neuromuscular re-education  · Plan of Care Expires:  03/02/18   Plan of Care Reviewed with: patient    Subjective     Communicated with nurse prior to session.  Patient found in R sidelying upon PT entry to room, agreeable to treatment.      Chief Complaint: decreased functional mobility and self care skills; decreased ROM  Patient comments/goals: improve functional mobility  Pain/Comfort:  · Pain Rating 1: 0/10 (no pain communicated at rest; paitent expressed some pain w/ PROM of BLE)    Patients cultural, spiritual, [employer]aritan conflicts given the current situation: none stated    Objective:     Patient found with:  (all lines intact)     General Precautions: Standard, aspiration,  fall, NPO   Orthopedic Precautions:N/A   Braces: N/A     Functional Mobility:  · Bed Mobility:     · Rolling Left:  moderate assistance  · Scooting: maximal assistance  · Supine to Sit: maximal assistance from L sidelying  · Patient required verbal and tactile cues for hand placement and execution of task      AM-PAC 6 CLICK MOBILITY  Turning over in bed (including adjusting bedclothes, sheets and blankets)?: 2  Sitting down on and standing up from a chair with arms (e.g., wheelchair, bedside commode, etc.): 2  Moving from lying on back to sitting on the side of the bed?: 2  Moving to and from a bed to a chair (including a wheelchair)?: 1  Need to walk in hospital room?: 1  Climbing 3-5 steps with a railing?: 1  Total Score: 9       Therapeutic Activities and Exercises:   Patient to sit EOB at CGA ~8 Min while PT helped assisted patient w/ BLE PROM to address flexion ROM deficits; Patient engaging core musculature throughout session while PCT's bathe patient's backside. Patient shows great improvement w/ activity and is able to sit EOB    Patient attempted to stand w/ PT, was unsuccesful w/ Total A    Patient left supine with all lines intact, call button in reach, nurse notified and PCT present..    GOALS:    Physical Therapy Goals        Problem: Physical Therapy Goal    Goal Priority Disciplines Outcome Goal Variances Interventions   Physical Therapy Goal     PT/OT, PT Ongoing (interventions implemented as appropriate)     Description:  Goals to be met by: 18     Patient will increase functional independence with mobility by performin. Supine to sit with Moderate Assistance  2. Sit to supine with Moderate Assistance  3. Rolling to Left and Right with Moderate Assistance. -Met                  Revised: Min A  4. Sitting at edge of bed x5 minutes with Minimal Assistance-Met                  Revised: x10 Min CGA  5. Lower extremity exercise program focusing on PROM  *Sit>Stand Goal and Gait goal to be  added when/if appropriate                         Time Tracking:     PT Received On: 02/02/18  PT Start Time: 0806     PT Stop Time: 0817  PT Total Time (min): 11 min     Billable Minutes: Therapeutic Activity 11 Min    Treatment Type: Treatment  PT/PTA: PT           Jamal Gil, PT  02/02/2018

## 2018-02-02 NOTE — PLAN OF CARE
Problem: Patient Care Overview  Goal: Plan of Care Review  Outcome: Ongoing (interventions implemented as appropriate)  POC reviewed with pt and family at 1400. PO ABX and pain management. Pt verbalized understanding. Questions and concerns addressed. No acute events today. Pt progressing toward goals. Will continue to monitor. See flowsheets for full assessment and VS info.

## 2018-02-02 NOTE — PROGRESS NOTES
[facility] Hospital Medicine  Progress Note    Patient Name: Tona Dey  MRN: [id number]  Patient Class: IP- Inpatient   Admission Date: 1/29/2018  Length of Stay: 3 days  Attending Physician: Davon Barry MD  Primary Care Provider: Primary Doctor [facility] Medicine Team: [facility] A Davon Barry MD    Subjective:     Principal Problem:Osteomyelitis      HPI: Mr. Tona eDy is a 86 y.o. male with Parkinson's Disease, bed bound, autonomic instability, recent LLE DVT on Eliquis, and obesity who presents to the ED because of fevers and toe pain. He mentions that for about 2 weeks, he has been having difficulty walking because of toe pain.  He denies any numbness or tingling in his feet.  He does have chronic LE swelling.  He endorses some drainage from the wound at night, but denies any foul smelling odor.  He denies any trauma to the toe.  He got antibiotics from his PCP, but is unsure of which one.  Family reports fevers up to 101.1F at home with a cough.     Patient information was obtained from patient and ER records.       Hospital Course: Admitted for 1/29 for possible osteo left foot, ulceration left great toe. Unsure of how injury occurred to left great toenail. Reports pain upon palpation right great toe nail. Per chart review patient on abx for left great toe infection per PCP. Pt's wife has various concern including arthralgias, swelling. Podiatry consulted, s/p hallax nail removal.  No signs of bone infection at this time. Will hold off on further imaging and bone biopsy at this time. No acute surgical intervention indicated at this time. CHELE's not concerning for PAD.     On 1/31, pt with more somnolence mental status, he did get Ultram this AM. He was sitting up eating breakfast this AM. No sign of infection but given mental status, will add IV cipro for soft tissue coverage. Discuss with wife, who is report that he fully extend arms, legs and feed himself 2 days ago.  But even in ED and now patient is very stiff/ridgid and would complain of pain when we try to straighten legs and arms. Wife has unrealistic expectation with what appears to be Advance/severe Parkinson Disease. Will ask neurology for further rec for treatment (pt's wife report that he was taken off all medications). Stop pain medications, start IVF for hydration. PT/OT: recommend SNF. 2/1, patient is more alert, talking and interacting with nursing team. Report LE pain on movement, lidocaine patch started, added Gabapentin. PRN toradol. Stop Ultram due to concern for AMS previously. Appreciate neuro eval and recommendation. Discuss with wife, pt is DNR DNI, no PEG tube, eval for hospice per wife request.    Interval History: At baseline mental status today, talking to wife and interacting with team. No new physical finding       Review of Systems   Limited due to mental status     Objective:     Vital Signs (Most Recent):  Temp: 98.3 °F (36.8 °C) (02/01/18 1656)  Pulse: 88 (02/01/18 1656)  Resp: 18 (02/01/18 1656)  BP: 101/61 (02/01/18 1656)  SpO2: 97 % (02/01/18 1656) Vital Signs (24h Range):  Temp:  [98.2 °F (36.8 °C)-98.8 °F (37.1 °C)] 98.3 °F (36.8 °C)  Pulse:  [85-91] 88  Resp:  [17-19] 18  SpO2:  [96 %-98 %] 97 %  BP: (101-127)/(54-68) 101/61     Weight: 98 kg (216 lb)  Body mass index is 33.83 kg/m².  No intake or output data in the 24 hours ending 02/01/18 2102     Physical Exam  Constitutional: Elderly, frailed male, thin  Cardiovascular: Normal rate and regular rhythm.  No murmur heard.  Pulmonary/Chest: Effort normal and breath sounds normal. No respiratory distress. No wheezes, rales, or rhonchi  Abdominal: Soft. Bowel sounds are normal.  No distension or tenderness  Musculoskeletal:  Contractures noted of both LE and UE  Skin: Skin is warm and dry. L toe nail place removed per podiatry      Significant Labs:   CBC:     Recent Labs  Lab 01/31/18  0726 01/31/18  1610 02/01/18  0450   WBC 6.17 10.57 8.22    HGB 9.2* 9.6* 8.4*   HCT 28.4* 28.8* 26.4*    210 220     CMP:     Recent Labs  Lab 01/31/18  0726 01/31/18  1610 02/01/18  0450    138 139   K 4.7 5.2* 3.9    110 109   CO2 23 21* 24    133* 123*   BUN 16 16 12   CREATININE 0.7 0.7 0.7   CALCIUM 8.6* 8.6* 8.5*   PROT 6.2 6.2 5.8*   ALBUMIN 2.5* 2.5* 2.4*   BILITOT 0.9 0.7 0.9   ALKPHOS 143* 145* 122   AST 60* 56* 46*   ALT 96* 107* 94*   ANIONGAP 6* 7* 6*   EGFRNONAA >60.0 >60.0 >60.0       Significant Imaging: I have reviewed all pertinent imaging results/findings within the past 24 hours.    Assessment/Plan:      Active Diagnoses:    Diagnosis Date Noted POA    PRINCIPAL PROBLEM:  Osteomyelitis [M86.9] 01/29/2018 Yes    Arthralgia [M25.50] 02/01/2018 Unknown    Pressure injury of skin, stage 2 [L89.92] 01/31/2018 Yes    Mild cognitive impairment [G31.84] 10/28/2017 Yes    Long term current use of anticoagulant therapy [Z79.01] 10/11/2017 Not Applicable    Parkinsonism [G20] 10/07/2017 Yes    Acute deep vein thrombosis (DVT) of popliteal vein of left lower extremity [I82.432] 10/05/2017 Yes    Lower extremity edema [R60.0] 10/02/2017 Yes    Autonomic instability [G90.9] 09/01/2017 Yes     Chronic    Obesity (BMI 30-39.9) [E66.9] 01/27/2016 Unknown      Problems Resolved During this Admission:    Diagnosis Date Noted Date Resolved POA     VTE Risk Mitigation         Ordered     rivaroxaban tablet 10 mg  With dinner     Route:  Oral        02/01/18 2052          Osteomyelitis of Left First Toe  - XR with concern for erosive changes of the left first metatarsalphalangeal joint  - ESR 58 and CRP 14 without fevers or leukocytosis  - Podiatry: s/p left hallux nail removal (1/30).    - Aerobic, anaerobic cultures pending.   - Dressed with betadine and mepilex border.  - Heel protector boots to be worn  - No acute surgical intervention indicated at this time.  - CHELE's not concerning for PAD.  - Given change in mental status 1/31,  start IV cipro to cover soft tissue and OM  - Change to PO Cipro ABx 7-14 days    AMS  - noted on 1/31, suspect due to pain medication, we stopped all pain medication  - on my eval this AM 2/1, back to baseline at this time  - SLP brianna, recommend MBBS  - after discussion with wife and improvement in mental status, will restart diet, pt is DNR    Parkinsonism, Advance   Mild Cognitive Impairment  - Stable, pt is bedbound, per wife Sinemet 25-100mg was stopped  - pt with LE contractures, painful when extending his LE ex bilat due to eliquis? Vs progression of his PD  - Appreciate neuro eval, Wife would not like to re-start the Sinemet at this time, start Gabapentin 300 TID for current pain management; can start w/ 300 QHS (at this time start at 100mg and monitor mental status)      Arthralgia   - Patient states that since the initiation of Eliquis he has experienced diffuse arthralgias; 2/2 this wife has only been giving patient medication QD rather than prescribed BID dose. This is listed as a known side effect   - trial of Xarelto QD       Autonomic Instability  - Stable  - Per wife, Midodrine 10mg PO TID (is more of PRN, she does not give it if SBP >130?)  - BP drops when he stand but he is now bed bound    Acute DVT of LLE Popliteal Vein  Lower Extremity DVT  - US LE 1/29 without new acute DVT  - Stop eliquis, trial of Xarelto QD    Sacral Wound  - Wound Care consult,  - Stage 2 pressure injury noted to patient's sacral spine, appears to be healing, epithelization noted.  - dressing with sacral foam dressing and change twice a week.    - Turn every 2hr       Diet:  Adult puree  DVT PPx:  Xarelto  Goals of Care: DNR DNI after discussion with wife     Disposition: PT/OT eval, suspect home with home health vs SNF with transition to hospice       Davon Barry MD  Department of Hospital Medicine   [facility]

## 2018-02-02 NOTE — PLAN OF CARE
[facility]     Department of Hospital Medicine     [address] [place] [address]     [phone] after hours  [phone] fax       NURSING HOME ORDERS    02/02/2018    Admit to Nursing Home:   Skilled Bed                                                   Diagnoses:  Active Hospital Problems    Diagnosis  POA    *Osteomyelitis [M86.9]  Yes    Arthralgia [M25.50]  Unknown    Pressure injury of skin, stage 2 [L89.92]  Yes    Mild cognitive impairment [G31.84]  Yes    Long term current use of anticoagulant therapy [Z79.01]  Not Applicable    Parkinsonism [G20]  Yes    Acute deep vein thrombosis (DVT) of popliteal vein of left lower extremity [I82.432]  Yes    Lower extremity edema [R60.0]  Yes    Autonomic instability [G90.9]  Yes     Chronic    Obesity (BMI 30-39.9) [E66.9]  Unknown      Resolved Hospital Problems    Diagnosis Date Resolved POA   No resolved problems to display.       Patient is homebound due to:  Osteomyelitis, Parkisonism, Autonomic instability, bedbound    Patient is DNR / DNI    Allergies:Review of patient's allergies indicates:  No Known Allergies    Vitals:      Every shift (Skilled Nursing patients)    Diet: Puree diet   Supplement:  1 can every three times a day with meals                         Type:  Glucerna    Activities:     - Up in a chair each morning as tolerated     LABS:  Per facility protocol    Nursing Precautions:    - Aspiration precautions:             - Total assistance with meals            -  Upright 90 degrees befor during and after meals             -  Suction at bedside          - Fall precautions per nursing home protocol   - Seizure precaution per detention protocol   - Decubitus precautions:        -  for positioning   - Pressure reducing foam mattress   - Turn patient every two hours. Use wedge pillows to anchor patient    CONSULTS:     Physical Therapy to evaluate and treat     Occupational  Therapy to evaluate and treat      MISCELLANEOUS CARE:     Routine Skin for Bedridden Patients:  Apply moisture barrier cream to all    skin folds and wet areas in perineal area daily and after baths and                           all bowel movements.    WOUND CARE:  Wound spray or saline for wound cleaning with all dressing changes.    All wounds to be measured with first dressing changes and every week.    Pressure Injury 01/29/18 2000 Sacral spine Stage 2, appears to be healing, epithelization noted.   Recommend dressing with sacral foam dressing and change twice a week.   Low air loss overlay for patient.   Turn every 2hrs.       Medications: Discontinue all previous medication orders, if any. See new list below.     Tona Dey   Home Medication Instructions JOSE:[id number]    Printed on:02/02/18 7246   Medication Information                      acetaminophen (TYLENOL) 325 MG tablet  Take 2 tablets (650 mg total) by mouth every 4 (four) hours as needed for Pain.             ciprofloxacin HCl (CIPRO) 500 MG tablet  Take 1 tablet (500 mg total) by mouth every 12 (twelve) hours.     End date 2/13/18             gabapentin (NEURONTIN) 100 MG capsule  Take 1 capsule (100 mg total) by mouth every evening. Titrate up to 3 times a day if needed             lidocaine (LIDODERM) 5 %  Place 2 patches onto the skin once daily. Remove & Discard patch within 12 hours or as directed by MD             magnesium hydroxide 400 mg/5 ml (MILK OF MAGNESIA) 400 mg/5 mL Susp  Take 30 mLs by mouth daily as needed (constipation).             midodrine (PROAMATINE) 10 MG tablet  Take 1 tablet (10 mg total) by mouth 3 (three) times daily as needed (if BP >110).             ranitidine (ZANTAC) 300 MG tablet  Take 300 mg by mouth daily as needed for Heartburn.             rivaroxaban (XARELTO) 10 mg Tab  Take 1 tablet (10 mg total) by mouth daily with dinner or evening meal.                   Signing Physician     Davon Barry,  MD  Hospital Medicine Staff  Department of [facility] Medicine   [facility]  2/2/2018 11:17 AM

## 2018-02-02 NOTE — SUBJECTIVE & OBJECTIVE
Interval History:Patient states that he noticed mild improvement in his symptoms w/ administration of medication; reports 7/10 pain this afternoon. Reviewed medication list w/ patient and clarified what each medication is for. No other complaints at this time.     Review of patient's allergies indicates:  No Known Allergies    Current Neurological Medications: Baclofen 100 mg QHS    Review of Systems   Constitutional: Positive for fatigue. Negative for fever.   HENT: Negative.    Eyes: Negative for photophobia and visual disturbance.   Respiratory: Negative.    Cardiovascular: Positive for leg swelling. Negative for palpitations.   Gastrointestinal: Negative.    Endocrine: Negative.    Genitourinary: Negative.    Musculoskeletal: Positive for arthralgias.   Skin: Positive for wound. Rash: L great toe.   Allergic/Immunologic: Negative.    Neurological: Positive for speech difficulty and weakness. Negative for tremors, seizures, syncope, light-headedness, numbness and headaches.   Psychiatric/Behavioral: Positive for confusion.     Objective:     Vital Signs (Most Recent):  Temp: 98.2 °F (36.8 °C) (02/02/18 1203)  Pulse: 83 (02/02/18 1203)  Resp: 17 (02/02/18 1203)  BP: 127/70 (02/02/18 1203)  SpO2: (!) 92 % (02/02/18 1203) Vital Signs (24h Range):  Temp:  [98 °F (36.7 °C)-98.9 °F (37.2 °C)] 98.2 °F (36.8 °C)  Pulse:  [83-88] 83  Resp:  [15-20] 17  SpO2:  [92 %-98 %] 92 %  BP: (101-127)/(60-70) 127/70     Weight: 98 kg (216 lb)  Body mass index is 33.83 kg/m².    Physical Exam     General:  Well-developed, obese. NAD  HEENT:  NCAT, PERRLA, EOMI, oropharyngeal membranes non-erythematous/without exudate, hypomimia  Neck:  Supple, normal ROM without nuchal rigidity  Resp:  Symmetric expansion, no increased wob  CVS: LE edema/ non-pitting b/l, peripheral pulses 2+ (radial, dorsalis pedis)  GI:  Abd soft, non-distended, non-tender to palpation  Mental Status:  Awake, alert, oriented to self and location. Speech hypophonic  and at times unclear, thought content appropriate.  Able to spell 'world' forward.   Cranial Nerves:  PERRLA, EOMI.  Facial movements minimal, sensation intact and symmetric.  Palate raises symmetrically, tongue protrudes midline.  Trapezius strength 5/5 bilaterally.  Motor:  Normal bulk and increased tone w/ BUE and BLE rigidity.  BUE shoulder abduction 4+/5, biceps/triceps 4+/5, wrist flexion/extension 4+/5,  strength 4+/5.  RLE hip flexors 3-/5, knee flexion/extension3-/5, plantarflexion/dorsiflexion 3-/5 and 2+/5 in LLE .  No pronator drift.  Sensory:  Intact to light touch without inattention.  Vibratory sensation intact at BUE digits, BLE lower shin.  Reflexes:  Biceps, brachioradialis, patellar, Achilles 2+ and symmetric.  No ankle clonus.   Coordination: No dysmetria/ataxia/dysdiadochokinesia noted. No noted tremor at rest/action. Marked bradykinesia.  Gait:  Deferred gait exam this am. Per PT, no improvement s/p LP 01/30/18.              Significant Labs:   Hemoglobin A1c: No results for input(s): HGBA1C in the last 720 hours.  CBC:     Recent Labs  Lab 01/31/18  1610 02/01/18  0450 02/02/18  0359   WBC 10.57 8.22 6.15   HGB 9.6* 8.4* 8.1*   HCT 28.8* 26.4* 25.4*    220 214     CMP:     Recent Labs  Lab 01/31/18  1610 02/01/18  0450 02/02/18  0359   * 123* 97    139 140   K 5.2* 3.9 3.6    109 111*   CO2 21* 24 22*   BUN 16 12 13   CREATININE 0.7 0.7 0.6   CALCIUM 8.6* 8.5* 8.6*   MG  --  1.6 1.7   PROT 6.2 5.8* 5.4*   ALBUMIN 2.5* 2.4* 2.2*   BILITOT 0.7 0.9 0.7   ALKPHOS 145* 122 105   AST 56* 46* 36   * 94* 67*   ANIONGAP 7* 6* 7*   EGFRNONAA >60.0 >60.0 >60.0     Inflammatory Markers: No results for input(s): SEDRATE, CRP, PROCAL in the last 48 hours.  POCT Glucose: No results for input(s): POCTGLUCOSE in the last 24 hours.  Respiratory Culture: No results for input(s): GSRESP, RESPIRATORYC in the last 48 hours.  All pertinent lab results from the past 24 hours have  been reviewed.    Significant Imaging: I have reviewed all pertinent imaging results/findings within the past 24 hours.

## 2018-02-02 NOTE — PROGRESS NOTES
[facility]  Neurology  Progress Note    Patient Name: Tona Dey  MRN: [id number]  Admission Date: 1/29/2018  Hospital Length of Stay: 4 days  Code Status: DNR   Attending Provider: Davon Barry MD  Primary Care Physician: Primary Doctor No   Principal Problem:Osteomyelitis    Interval History:Patient states that he noticed mild improvement in his symptoms w/ administration of medication; reports 7/10 pain this afternoon. Reviewed medication list w/ patient and clarified what each medication is for. No other complaints at this time.     Review of patient's allergies indicates:  No Known Allergies    Current Neurological Medications: Baclofen 100 mg QHS    Review of Systems   Constitutional: Positive for fatigue. Negative for fever.   HENT: Negative.    Eyes: Negative for photophobia and visual disturbance.   Respiratory: Negative.    Cardiovascular: Positive for leg swelling. Negative for palpitations.   Gastrointestinal: Negative.    Endocrine: Negative.    Genitourinary: Negative.    Musculoskeletal: Positive for arthralgias.   Skin: Positive for wound. Rash: L great toe.   Allergic/Immunologic: Negative.    Neurological: Positive for speech difficulty and weakness. Negative for tremors, seizures, syncope, light-headedness, numbness and headaches.   Psychiatric/Behavioral: Positive for confusion.     Objective:     Vital Signs (Most Recent):  Temp: 98.2 °F (36.8 °C) (02/02/18 1203)  Pulse: 83 (02/02/18 1203)  Resp: 17 (02/02/18 1203)  BP: 127/70 (02/02/18 1203)  SpO2: (!) 92 % (02/02/18 1203) Vital Signs (24h Range):  Temp:  [98 °F (36.7 °C)-98.9 °F (37.2 °C)] 98.2 °F (36.8 °C)  Pulse:  [83-88] 83  Resp:  [15-20] 17  SpO2:  [92 %-98 %] 92 %  BP: (101-127)/(60-70) 127/70     Weight: 98 kg (216 lb)  Body mass index is 33.83 kg/m².    Physical Exam     General:  Well-developed, obese. NAD  HEENT:  NCAT, PERRLA, EOMI, oropharyngeal membranes non-erythematous/without exudate, hypomimia  Neck:   Supple, normal ROM without nuchal rigidity  Resp:  Symmetric expansion, no increased wob  CVS: LE edema/ non-pitting b/l, peripheral pulses 2+ (radial, dorsalis pedis)  GI:  Abd soft, non-distended, non-tender to palpation  Mental Status:  Awake, alert, oriented to self and location. Speech hypophonic and at times unclear, thought content appropriate.  Able to spell 'world' forward.   Cranial Nerves:  PERRLA, EOMI.  Facial movements minimal, sensation intact and symmetric.  Palate raises symmetrically, tongue protrudes midline.  Trapezius strength 5/5 bilaterally.  Motor:  Normal bulk and increased tone w/ BUE and BLE rigidity.  BUE shoulder abduction 4+/5, biceps/triceps 4+/5, wrist flexion/extension 4+/5,  strength 4+/5.  RLE hip flexors 3-/5, knee flexion/extension3-/5, plantarflexion/dorsiflexion 3-/5 and 2+/5 in LLE .  No pronator drift.  Sensory:  Intact to light touch without inattention.  Vibratory sensation intact at BUE digits, BLE lower shin.  Reflexes:  Biceps, brachioradialis, patellar, Achilles 2+ and symmetric.  No ankle clonus.   Coordination: No dysmetria/ataxia/dysdiadochokinesia noted. No noted tremor at rest/action. Marked bradykinesia.  Gait:  Deferred gait exam this am. Per PT, no improvement s/p LP 01/30/18.              Significant Labs:   Hemoglobin A1c: No results for input(s): HGBA1C in the last 720 hours.  CBC:     Recent Labs  Lab 01/31/18  1610 02/01/18  0450 02/02/18  0359   WBC 10.57 8.22 6.15   HGB 9.6* 8.4* 8.1*   HCT 28.8* 26.4* 25.4*    220 214     CMP:     Recent Labs  Lab 01/31/18  1610 02/01/18  0450 02/02/18  0359   * 123* 97    139 140   K 5.2* 3.9 3.6    109 111*   CO2 21* 24 22*   BUN 16 12 13   CREATININE 0.7 0.7 0.6   CALCIUM 8.6* 8.5* 8.6*   MG  --  1.6 1.7   PROT 6.2 5.8* 5.4*   ALBUMIN 2.5* 2.4* 2.2*   BILITOT 0.7 0.9 0.7   ALKPHOS 145* 122 105   AST 56* 46* 36   * 94* 67*   ANIONGAP 7* 6* 7*   EGFRNONAA >60.0 >60.0 >60.0      Inflammatory Markers: No results for input(s): SEDRATE, CRP, PROCAL in the last 48 hours.  POCT Glucose: No results for input(s): POCTGLUCOSE in the last 24 hours.  Respiratory Culture: No results for input(s): GSRESP, RESPIRATORYC in the last 48 hours.  All pertinent lab results from the past 24 hours have been reviewed.    Significant Imaging: I have reviewed all pertinent imaging results/findings within the past 24 hours.    Assessment and Plan:     Parkinsonism    - Patient initially noted to have parkinsonian symptoms on examination during admission in October; was started on Sinemet TID ( 25/100) but due to intolerance stopped the medication at home  - [facility] appointment to establish care was cancelled by the patient  - Wife would not like to re-start the Sinemet at this time  - Recommend to increased Gabapentin dose from 100 QHS to 300 QHS and eventually up-titrate to 300 mg TID for full effect.   - Follow up w/ [facility] is recommended upon discharge         Arthralgia    - Patient states that since the initiation of Eliquis he has experienced diffuse arthralgias; 2/2 this wife has only been giving patient medication QD rather than prescribed BID dose   - This is listed as a known side effect   - Would consider switching to another NOAC at this time             VTE Risk Mitigation         Ordered     rivaroxaban tablet 10 mg  With dinner     Route:  Oral        02/01/18 2052          Akila Marroquin MD  Neurology  [facility]

## 2018-02-02 NOTE — PT/OT/SLP PROGRESS
"Speech-Language Pathology Treatment    Patient Name:  Tona Dey   MRN:  [id number]  Admitting Diagnosis: Osteomyelitis    Recommendations:                   Diet recommendations:  Puree, Liquid Diet Level: Thin   Aspiration Precautions: 1 bite/sip at a time, Feed only when awake/alert, HOB to 90 degrees, Meds whole 1 at a time, Small bites/sips and Strict aspiration precautions   General Precautions: Standard, fall    Subjective   Pt with increased alertness today. Pt found with breakfast tray of puree & thin liquids, was not found consuming, 50% consumed.     Per pt report of diet at home, appears to be on a regular solids diet "red beans & rice, fried chicken & pork chops."    Pain/Comfort:  · Pain Rating 1: 0/10  · Pain Rating Post-Intervention 2: 0/10    Objective:     Has the patient been evaluated by SLP for swallowing?   Yes  Keep patient NPO? No   Current Respiratory Status: room air      Pt with wet vocal quality prior to PO trials with SLP. Pt cued to clear throat & eleicit volitional swallow, this was unsuccessful in attempt to clear. Pt consumed 6 oz thin liquid via cup & straw sips, no change in vocal quality & no other overt s/s aspiration. Swallow appears timely today, continues to have mildly decreased hyolaryngeal excursion & rise felt via digital palpation. Pt tolerated puree bites x2 & solid trial (sammy cracker) bites x4 with continued wet vocal quality but no change pre/post trials & no further s/s aspiration & adequate oral phase of swallow. SLP reviewed recs & precautions with pt & NSG, understanding verbalized. Pt educated on risks of aspiration, s/s aspiration, reasons for continued puree rec, POC, etc. Pt may require reinforcement to follow precautions.     Assessment:     Tona Dey is a 86 y.o. male with an SLP diagnosis of dysphagia, improving. Pt remains at risk for aspiration but consistent wet vocal quality does not appear to be overt s/s aspiration, ST service will continue to " follow for monitoring. Pt appears safe for soft solids but SLP recs that he remain on puree 2/2 wax/wane alertness/difficulty swallowing. When deemed consistently safe with solids, may advance diet.     Goals:    SLP Goals        Problem: SLP Goal    Goal Priority Disciplines Outcome   SLP Goal     SLP Ongoing (interventions implemented as appropriate)   Description:  Speech Language Pathology Goals  Goals expected to be met by 2/8  1. Ongoing swallow assessment                      Plan:     · Patient to be seen:  4 x/week   · Plan of Care expires:  03/02/18  · Plan of Care reviewed with:  patient   · SLP Follow-Up:  Yes       Discharge recommendations:  nursing facility, skilled     Time Tracking:     SLP Treatment Date:   02/02/18  Speech Start Time:  0909  Speech Stop Time:  0925     Speech Total Time (min):  16 min    Billable Minutes: Treatment Swallowing Dysfunction 8 and Seld Care/Home Management Training 8    Manda Hernandez CCC-SLP  02/02/2018

## 2018-02-02 NOTE — PROGRESS NOTES
[facility] Hospital Medicine  Progress Note    Patient Name: Tona Dey  MRN: [id number]  Patient Class: IP- Inpatient   Admission Date: 1/29/2018  Length of Stay: 4 days  Attending Physician: Davon Barry MD  Primary Care Provider: [facility] Medicine Team: [facility] A Davon Barry MD    Subjective:     Principal Problem:Osteomyelitis      HPI: Mr. Tona Dey is a 86 y.o. male with Parkinson's Disease, bed bound, autonomic instability, recent LLE DVT on Eliquis, and obesity who presents to the ED because of fevers and toe pain. He mentions that for about 2 weeks, he has been having difficulty walking because of toe pain.  He denies any numbness or tingling in his feet.  He does have chronic LE swelling.  He endorses some drainage from the wound at night, but denies any foul smelling odor.  He denies any trauma to the toe.  He got antibiotics from his PCP, but is unsure of which one.  Family reports fevers up to 101.1F at home with a cough.     Patient information was obtained from patient and ER records.       Hospital Course: Admitted for 1/29 for possible osteo left foot, ulceration left great toe. Unsure of how injury occurred to left great toenail. Reports pain upon palpation right great toe nail. Per chart review patient on abx for left great toe infection per PCP. Pt's wife has various concern including arthralgias, swelling. Podiatry consulted, s/p hallax nail removal.  No signs of bone infection at this time. Will hold off on further imaging and bone biopsy at this time. No acute surgical intervention indicated at this time. CHELE's not concerning for PAD.     On 1/31, pt with more somnolence mental status, he did get Ultram this AM. He was sitting up eating breakfast this AM. No sign of infection but given mental status, will add IV cipro for soft tissue coverage. Discuss with wife, who is report that he fully extend arms, legs and feed himself 2 days ago.  But even in ED and now patient is very stiff/ridgid and would complain of pain when we try to straighten legs and arms. Wife has unrealistic expectation with what appears to be Advance/severe Parkinson Disease. Will ask neurology for further rec for treatment (pt's wife report that he was taken off all medications). Stop pain medications, start IVF for hydration. PT/OT: recommend SNF. 2/1, patient is more alert, talking and interacting with nursing team. Report LE pain on movement, lidocaine patch started, added Gabapentin. PRN toradol. Stop Ultram due to concern for AMS previously. Appreciate neuro eval and recommendation. Discuss with wife, pt is DNR DNI, no PEG tube, eval for hospice per wife request. 2/2/18, patient mental status is at baseline, alert and oriented, answer ALL questions appropriately, pain is more from skin breakdown today than hip pain. Change IV cipro to PO cipro to treat soft tissue for 14 days total. Plan to transfer to SNF.    Interval History: At baseline mental status today, talking to wife and interacting with team. No new physical finding       Review of Systems   Respiratory: Negative for cough, shortness of breath.    Cardiovascular: Negative for chest pain, palpitations  Gastrointestinal: Negative for abdominal pain, constipation, diarrhea, nausea, vomiting.   Endocrine: Negative for cold intolerance, heat intolerance.   Genitourinary: Negative for dysuria, frequency.   Musculoskeletal: Negative for arthralgias, myalgias.   Skin: + wound  Neurological: Negative for dizziness, syncope, weakness, light-headedness.   Psychiatric/Behavioral: Negative for confusion, hallucinations, anxiety     Objective:     Vital Signs (Most Recent):  Temp: 98.2 °F (36.8 °C) (02/02/18 1203)  Pulse: 83 (02/02/18 1203)  Resp: 17 (02/02/18 1203)  BP: 127/70 (02/02/18 1203)  SpO2: (!) 92 % (02/02/18 1203) Vital Signs (24h Range):  Temp:  [98 °F (36.7 °C)-98.9 °F (37.2 °C)] 98.2 °F (36.8 °C)  Pulse:  [83-88]  83  Resp:  [15-20] 17  SpO2:  [92 %-98 %] 92 %  BP: (101-127)/(60-70) 127/70     Weight: 98 kg (216 lb)  Body mass index is 33.83 kg/m².  No intake or output data in the 24 hours ending 02/02/18 1345     Physical Exam  Constitutional: Elderly, frailed male, thin  Cardiovascular: Normal rate and regular rhythm.  No murmur heard.  Pulmonary/Chest: Effort normal and breath sounds normal. No respiratory distress. No wheezes, rales, or rhonchi  Abdominal: Soft. Bowel sounds are normal.  No distension or tenderness  Musculoskeletal:  Contractures noted of both LE and UE  Skin: Skin is warm and dry. L toe nail place removed per podiatry, wrapped by podiatry      Significant Labs:   CBC:     Recent Labs  Lab 01/31/18  1610 02/01/18  0450 02/02/18  0359   WBC 10.57 8.22 6.15   HGB 9.6* 8.4* 8.1*   HCT 28.8* 26.4* 25.4*    220 214     CMP:     Recent Labs  Lab 01/31/18  1610 02/01/18  0450 02/02/18  0359    139 140   K 5.2* 3.9 3.6    109 111*   CO2 21* 24 22*   * 123* 97   BUN 16 12 13   CREATININE 0.7 0.7 0.6   CALCIUM 8.6* 8.5* 8.6*   PROT 6.2 5.8* 5.4*   ALBUMIN 2.5* 2.4* 2.2*   BILITOT 0.7 0.9 0.7   ALKPHOS 145* 122 105   AST 56* 46* 36   * 94* 67*   ANIONGAP 7* 6* 7*   EGFRNONAA >60.0 >60.0 >60.0       Significant Imaging: I have reviewed all pertinent imaging results/findings within the past 24 hours.    Assessment/Plan:      Active Diagnoses:    Diagnosis Date Noted POA    PRINCIPAL PROBLEM:  Osteomyelitis [M86.9] 01/29/2018 Yes    Arthralgia [M25.50] 02/01/2018 Unknown    Pressure injury of skin, stage 2 [L89.92] 01/31/2018 Yes    Mild cognitive impairment [G31.84] 10/28/2017 Yes    Long term current use of anticoagulant therapy [Z79.01] 10/11/2017 Not Applicable    Parkinsonism [G20] 10/07/2017 Yes    Acute deep vein thrombosis (DVT) of popliteal vein of left lower extremity [I82.432] 10/05/2017 Yes    Lower extremity edema [R60.0] 10/02/2017 Yes    Autonomic instability  [G90.9] 09/01/2017 Yes     Chronic    Obesity (BMI 30-39.9) [E66.9] 01/27/2016 Unknown      Problems Resolved During this Admission:    Diagnosis Date Noted Date Resolved POA     VTE Risk Mitigation         Ordered     rivaroxaban tablet 10 mg  With dinner     Route:  Oral        02/01/18 2052          Osteomyelitis of Left First Toe  - XR with concern for erosive changes of the left first metatarsalphalangeal joint  - ESR 58 and CRP 14 without fevers or leukocytosis  - Podiatry: s/p left hallux nail removal (1/30).    - Aerobic, anaerobic cultures pending.   - Dressed with betadine and mepilex border.  - Heel protector boots to be worn  - No acute surgical intervention indicated at this time.  - CHELE's not concerning for PAD.  - Given change in mental status 1/31, start IV cipro to cover soft tissue and OM, Change to PO Cipro ABx for a total of 14 days    AMS  - noted on 1/31, suspected due to pain medication, we stopped all pain medication  - on my eval this AM 2/1, back to baseline at this time  - 2/2, back to baseline, tolerated diet (puree), talking and interacting with team  - change pain medication (stop Ultram), start lidocaine patches, PRN toradol     Parkinsonism, Advance   Mild Cognitive Impairment  - Stable, pt is bedbound, per wife Sinemet 25-100mg was stopped  - pt with LE contractures, painful when extending his LE ex bilat due to eliquis? Vs progression of his PD  - Appreciate neuro eval, Wife would not like to re-start the Sinemet at this time, start Gabapentin 300 TID for current pain management; can start w/ 300 QHS (at this time start at 100mg)      Arthralgia   - Patient states that since the initiation of Eliquis he has experienced diffuse arthralgias; 2/2 this wife has only been giving patient medication QD rather than prescribed BID dose. This is listed as a known side effect   - trial of Xarelto QD instead of Eliquis       Autonomic Instability  - Stable  - Per wife, Midodrine 10mg PO TID  (is more of PRN, she does not give it if SBP >130?)  - BP drops when he stand but he is now bed bound    Acute DVT of LLE Popliteal Vein  Lower Extremity DVT  - US LE 1/29 without new acute DVT  - Stop eliquis, trial of Xarelto QD    Sacral Wound (present on admission)  - Wound Care consulted  - Stage 2 pressure injury noted to patient's sacral spine, appears to be healing, epithelization noted.  - dressing with sacral foam dressing and change twice a week.    - Turn every 2hr       Diet:  Adult puree  DVT PPx:  Xarelto  Goals of Care: DNR DNI after discussion with wife     Disposition: PT/OT eval, SNF with eventual transition to hospice when that time comes (wife is comfortable with this plan)       Davon Barry MD  Department of Hospital Medicine   [facility]

## 2018-02-02 NOTE — PLAN OF CARE
Problem: SLP Goal  Goal: SLP Goal  Speech Language Pathology Goals  Goals expected to be met by 2/8  1. Ongoing swallow assessment     Outcome: Ongoing (interventions implemented as appropriate)  Pt was seen today for ST session.     Manda Hernandez MS, CCC-SLP  Speech Language Pathologist  Pager: [phone]  2/2/2018

## 2018-02-02 NOTE — PLAN OF CARE
12:20 PM  SW requesting SNF authorization.     Susan Rosado LMSW   [facility]  Ext [phone]

## 2018-02-03 LAB
ALBUMIN SERPL BCP-MCNC: 2.3 G/DL
ALP SERPL-CCNC: 105 U/L
ALT SERPL W/O P-5'-P-CCNC: 60 U/L
ANION GAP SERPL CALC-SCNC: 7 MMOL/L
AST SERPL-CCNC: 29 U/L
BASOPHILS # BLD AUTO: 0.03 K/UL
BASOPHILS NFR BLD: 0.7 %
BILIRUB SERPL-MCNC: 0.7 MG/DL
BUN SERPL-MCNC: 12 MG/DL
CALCIUM SERPL-MCNC: 8.6 MG/DL
CHLORIDE SERPL-SCNC: 111 MMOL/L
CO2 SERPL-SCNC: 25 MMOL/L
CREAT SERPL-MCNC: 0.7 MG/DL
DIFFERENTIAL METHOD: ABNORMAL
EOSINOPHIL # BLD AUTO: 0.1 K/UL
EOSINOPHIL NFR BLD: 3.1 %
ERYTHROCYTE [DISTWIDTH] IN BLOOD BY AUTOMATED COUNT: 13.6 %
EST. GFR  (AFRICAN AMERICAN): >60 ML/MIN/1.73 M^2
EST. GFR  (NON AFRICAN AMERICAN): >60 ML/MIN/1.73 M^2
GLUCOSE SERPL-MCNC: 103 MG/DL
HCT VFR BLD AUTO: 27.6 %
HGB BLD-MCNC: 8.7 G/DL
IMM GRANULOCYTES # BLD AUTO: 0.02 K/UL
IMM GRANULOCYTES NFR BLD AUTO: 0.4 %
LYMPHOCYTES # BLD AUTO: 2.3 K/UL
LYMPHOCYTES NFR BLD: 51.1 %
MAGNESIUM SERPL-MCNC: 1.7 MG/DL
MCH RBC QN AUTO: 31.6 PG
MCHC RBC AUTO-ENTMCNC: 31.5 G/DL
MCV RBC AUTO: 100 FL
MONOCYTES # BLD AUTO: 0.3 K/UL
MONOCYTES NFR BLD: 6.8 %
NEUTROPHILS # BLD AUTO: 1.7 K/UL
NEUTROPHILS NFR BLD: 37.9 %
NRBC BLD-RTO: 0 /100 WBC
PHOSPHATE SERPL-MCNC: 3 MG/DL
PLATELET # BLD AUTO: 241 K/UL
PMV BLD AUTO: 10.1 FL
POTASSIUM SERPL-SCNC: 3.8 MMOL/L
PROT SERPL-MCNC: 5.6 G/DL
RBC # BLD AUTO: 2.75 M/UL
SODIUM SERPL-SCNC: 143 MMOL/L
WBC # BLD AUTO: 4.56 K/UL

## 2018-02-03 PROCEDURE — 80053 COMPREHEN METABOLIC PANEL: CPT

## 2018-02-03 PROCEDURE — 25000003 PHARM REV CODE 250: Performed by: HOSPITALIST

## 2018-02-03 PROCEDURE — 11000001 HC ACUTE MED/SURG PRIVATE ROOM

## 2018-02-03 PROCEDURE — 83735 ASSAY OF MAGNESIUM: CPT

## 2018-02-03 PROCEDURE — 36415 COLL VENOUS BLD VENIPUNCTURE: CPT

## 2018-02-03 PROCEDURE — 85025 COMPLETE CBC W/AUTO DIFF WBC: CPT

## 2018-02-03 PROCEDURE — 63600175 PHARM REV CODE 636 W HCPCS: Performed by: HOSPITALIST

## 2018-02-03 PROCEDURE — 84100 ASSAY OF PHOSPHORUS: CPT

## 2018-02-03 PROCEDURE — 99232 SBSQ HOSP IP/OBS MODERATE 35: CPT | Mod: ,,, | Performed by: HOSPITALIST

## 2018-02-03 RX ORDER — GABAPENTIN 100 MG/1
200 CAPSULE ORAL NIGHTLY
Status: DISCONTINUED | OUTPATIENT
Start: 2018-02-03 | End: 2018-02-06 | Stop reason: HOSPADM

## 2018-02-03 RX ADMIN — CIPROFLOXACIN HYDROCHLORIDE 500 MG: 250 TABLET, FILM COATED ORAL at 09:02

## 2018-02-03 RX ADMIN — KETOROLAC TROMETHAMINE 15 MG: 15 INJECTION, SOLUTION INTRAMUSCULAR; INTRAVENOUS at 10:02

## 2018-02-03 RX ADMIN — GABAPENTIN 200 MG: 100 CAPSULE ORAL at 09:02

## 2018-02-03 RX ADMIN — RIVAROXABAN 10 MG: 10 TABLET, FILM COATED ORAL at 04:02

## 2018-02-03 RX ADMIN — LIDOCAINE 2 PATCH: 50 PATCH TOPICAL at 03:02

## 2018-02-03 NOTE — PROGRESS NOTES
[facility] Hospital Medicine  Progress Note    Patient Name: Tona Dey  MRN: [id number]  Patient Class: IP- Inpatient   Admission Date: 1/29/2018  Length of Stay: 5 days  Attending Physician: Davon Barry MD  Primary Care Provider: Primary Doctor [facility] Medicine Team: [facility] [place] [facility] MED A Davon Barry MD    Subjective:     Principal Problem:Osteomyelitis      HPI: Mr. Tona Dey is a 86 y.o. male with Parkinson's Disease, bed bound, autonomic instability, recent LLE DVT on Eliquis, and obesity who presents to the ED because of fevers and toe pain. He mentions that for about 2 weeks, he has been having difficulty walking because of toe pain.  He denies any numbness or tingling in his feet.  He does have chronic LE swelling.  He endorses some drainage from the wound at night, but denies any foul smelling odor.  He denies any trauma to the toe.  He got antibiotics from his PCP, but is unsure of which one.  Family reports fevers up to 101.1F at home with a cough.     Patient information was obtained from patient and ER records.       Hospital Course: Admitted for 1/29 for possible osteo left foot, ulceration left great toe. Unsure of how injury occurred to left great toenail. Reports pain upon palpation right great toe nail. Per chart review patient on abx for left great toe infection per PCP. Pt's wife has various concern including arthralgias, swelling. Podiatry consulted, s/p hallax nail removal.  No signs of bone infection at this time. Will hold off on further imaging and bone biopsy at this time. No acute surgical intervention indicated at this time. CHELE's not concerning for PAD.     On 1/31, pt with more somnolence mental status, he did get Ultram this AM. He was sitting up eating breakfast this AM. No sign of infection but given mental status, will add IV cipro for soft tissue coverage. Discuss with wife, who is report that he fully extend arms, legs and feed himself 2 days ago.  But even in ED and now patient is very stiff/ridgid and would complain of pain when we try to straighten legs and arms. Wife has unrealistic expectation with what appears to be Advance/severe Parkinson Disease. Will ask neurology for further rec for treatment (pt's wife report that he was taken off all medications). Stop pain medications, start IVF for hydration. PT/OT: recommend SNF. 2/1, patient is more alert, talking and interacting with nursing team. Report LE pain on movement, lidocaine patch started, added Gabapentin. PRN toradol. Stop Ultram due to concern for AMS previously. Appreciate neuro eval and recommendation. Discuss with wife, pt is DNR DNI, no PEG tube, eval for hospice per wife request. 2/2/18, patient mental status is at baseline, alert and oriented, answer ALL questions appropriately, pain is more from skin breakdown today than hip pain. Change IV cipro to PO cipro to treat soft tissue for 14 days total. Plan to transfer to SNF awaiting approval.    Interval History: At baseline mental status today, talking to wife and interacting with team. No new physical finding. Advance diet to dental soft per wife request. Pt has no complains       Review of Systems   Respiratory: Negative for cough, shortness of breath.    Cardiovascular: Negative for chest pain, palpitations  Gastrointestinal: Negative for abdominal pain, constipation, diarrhea, nausea, vomiting.   Endocrine: Negative for cold intolerance, heat intolerance.   Genitourinary: Negative for dysuria, frequency.   Musculoskeletal: Negative for arthralgias, myalgias.   Skin: + wound  Neurological: Negative for dizziness, syncope, weakness, light-headedness.   Psychiatric/Behavioral: Negative for confusion, hallucinations, anxiety     Objective:     Vital Signs (Most Recent):  Temp: 97.1 °F (36.2 °C) (02/03/18 1128)  Pulse: 92 (02/03/18 1128)  Resp: 18 (02/03/18 1128)  BP: (!) 158/67 (02/03/18 1128)  SpO2: 98 % (02/03/18 1128) Vital Signs (24h  Range):  Temp:  [97.1 °F (36.2 °C)-98.5 °F (36.9 °C)] 97.1 °F (36.2 °C)  Pulse:  [80-92] 92  Resp:  [16-18] 18  SpO2:  [94 %-99 %] 98 %  BP: (112-158)/(63-79) 158/67     Weight: 98 kg (216 lb)  Body mass index is 33.83 kg/m².    Intake/Output Summary (Last 24 hours) at 02/03/18 1412  Last data filed at 02/02/18 2030   Gross per 24 hour   Intake               90 ml   Output                0 ml   Net               90 ml        Physical Exam  Constitutional: Elderly, frailed male, thin  Cardiovascular: Normal rate and regular rhythm.  No murmur heard.  Pulmonary/Chest: Effort normal and breath sounds normal. No respiratory distress. No wheezes, rales, or rhonchi  Abdominal: Soft. Bowel sounds are normal.  No distension or tenderness  Musculoskeletal:  Contractures noted of both LE and UE  Skin: Skin is warm and dry. L toe nail place removed per podiatry, wrapped by podiatry      Significant Labs:   CBC:     Recent Labs  Lab 02/02/18  0359 02/03/18  0735   WBC 6.15 4.56   HGB 8.1* 8.7*   HCT 25.4* 27.6*    241     CMP:     Recent Labs  Lab 02/02/18  0359 02/03/18  0735    143   K 3.6 3.8   * 111*   CO2 22* 25   GLU 97 103   BUN 13 12   CREATININE 0.6 0.7   CALCIUM 8.6* 8.6*   PROT 5.4* 5.6*   ALBUMIN 2.2* 2.3*   BILITOT 0.7 0.7   ALKPHOS 105 105   AST 36 29   ALT 67* 60*   ANIONGAP 7* 7*   EGFRNONAA >60.0 >60.0       Significant Imaging: I have reviewed all pertinent imaging results/findings within the past 24 hours.    Assessment/Plan:      Active Diagnoses:    Diagnosis Date Noted POA    PRINCIPAL PROBLEM:  Osteomyelitis [M86.9] 01/29/2018 Yes    Arthralgia [M25.50] 02/01/2018 Unknown    Pressure injury of skin, stage 2 [L89.92] 01/31/2018 Yes    Mild cognitive impairment [G31.84] 10/28/2017 Yes    Long term current use of anticoagulant therapy [Z79.01] 10/11/2017 Not Applicable    Parkinsonism [G20] 10/07/2017 Yes    Acute deep vein thrombosis (DVT) of popliteal vein of left lower extremity  [I82.432] 10/05/2017 Yes    Lower extremity edema [R60.0] 10/02/2017 Yes    Autonomic instability [G90.9] 09/01/2017 Yes     Chronic    Obesity (BMI 30-39.9) [E66.9] 01/27/2016 Unknown      Problems Resolved During this Admission:    Diagnosis Date Noted Date Resolved POA     VTE Risk Mitigation         Ordered     rivaroxaban tablet 10 mg  With dinner     Route:  Oral        02/01/18 2052          Osteomyelitis of Left First Toe  - XR with concern for erosive changes of the left first metatarsalphalangeal joint  - ESR 58 and CRP 14 without fevers or leukocytosis  - Podiatry: s/p left hallux nail removal (1/30).    - Aerobic, anaerobic cultures pending.   - Dressed with betadine and mepilex border.  - Heel protector boots to be worn  - No acute surgical intervention indicated at this time.  - CHLEE's not concerning for PAD.  - Given change in mental status 1/31, start IV cipro to cover soft tissue and OM  - Change to PO Cipro ABx for a total of 14 days End date 2/13/18    AMS  - noted on 1/31, suspected due to pain medication, we stopped all pain medication  - on my eval this AM 2/1, back to baseline at this time  - 2/2, back to baseline, tolerated diet (puree), talking and interacting with team  - change pain medication (stop Ultram), start lidocaine patches, PRN toradol   - resolved    Parkinsonism, Advance   Mild Cognitive Impairment  - Stable, pt is bedbound, per wife Sinemet 25-100mg was stopped  - pt with LE contractures, painful when extending his LE ex bilat due to eliquis? Vs progression of his PD  - Appreciate neuro eval, Wife would not like to re-start the Sinemet at this time, start Gabapentin 300 TID for current pain management; can start w/ 300 QHS (increase from 100mg QHS to 200mg)      Arthralgia   - Patient states that since the initiation of Eliquis he has experienced diffuse arthralgias; 2/2 this wife has only been giving patient medication QD rather than prescribed BID dose. This is listed as a  known side effect   - trial of Xarelto QD instead of Eliquis  - unclear if pt is complaining of joint pain or skin break down  - improvement with Lidocaine patch      Autonomic Instability  - Stable  - Per wife, Midodrine 10mg PO TID (is more of PRN, she does not give it if SBP >130?)  - BP drops when he stand but he is now bed bound    Acute DVT of LLE Popliteal Vein  Lower Extremity DVT  - US LE 1/29 without new acute DVT  - Stop eliquis, trial of Xarelto QD    Sacral Wound (present on admission)  - Wound Care consulted  - Stage 2 pressure injury noted to patient's sacral spine, appears to be healing, epithelization noted.  - dressing with sacral foam dressing and change twice a week.    - Turn every 2hr       Diet:  Adult puree  DVT PPx:  Xarelto  Goals of Care: DNR DNI after discussion with wife      Disposition: PT/OT eval, SNF with eventual transition to hospice when that time comes (wife is comfortable with this plan)   Awaiting acceptance    Davon Barry MD  Department of Hospital Medicine   [facility]

## 2018-02-03 NOTE — PROGRESS NOTES
"[facility]  Podiatry  Progress Note    Patient Name: Tona Dey  MRN: [id number]  Admission Date: 1/29/2018  Hospital Length of Stay: 4 days  Attending Physician: Davon Barry MD  Primary Care Provider: Primary Doctor No   Interval Hx:  Patient s/p left hallux avulsion.  Pt relates nail from 2nd toe fell off and states he "bumped it on equipment during his physical therapy session". Relates to mild pain. No other pedal complaints at this time.     Scheduled Meds:   ciprofloxacin HCl  500 mg Oral Q12H    gabapentin  100 mg Oral QHS    lidocaine  2 patch Transdermal Q24H    neomycin-bacitracin-polymyxin   Topical (Top) Daily    rivaroxaban  10 mg Oral Daily with dinner    tuberculin  5 Units Intradermal Once     Continuous Infusions:    PRN Meds:acetaminophen, dextrose 50%, dextrose 50%, glucagon (human recombinant), glucose, glucose, ketorolac, ondansetron, sodium chloride 0.9%    Review of patient's allergies indicates:  No Known Allergies     Past Medical History:   Diagnosis Date    Anemia     Chronic back pain     Hypertension     Lumbago     Orthostatic hypotension dysautonomic syndrome     Parkinson disease     Renal disorder      Past Surgical History:   Procedure Laterality Date    BACK SURGERY  1961    ROTATOR CUFF REPAIR         Family History     Problem Relation (Age of Onset)    Cancer Father    No Known Problems Mother        Social History Main Topics    Smoking status: Former Smoker     Quit date: 8/25/1985    Smokeless tobacco: Never Used    Alcohol use 1.2 oz/week     2 Shots of liquor per week    Drug use: Unknown    Sexual activity: Yes     Partners: Female     Review of Systems   Constitutional: Positive for activity change.   Respiratory: Negative for shortness of breath.    Cardiovascular: Negative for chest pain and leg swelling.   Gastrointestinal: Negative for nausea and vomiting.   Genitourinary: Negative.    Musculoskeletal: Positive for " arthralgias and myalgias.   Skin: Positive for color change.   Neurological: Positive for numbness. Negative for weakness.   Psychiatric/Behavioral: Negative.      Objective:     Vital Signs (Most Recent):  Temp: 98.2 °F (36.8 °C) (02/02/18 1203)  Pulse: 83 (02/02/18 1203)  Resp: 17 (02/02/18 1203)  BP: 127/70 (02/02/18 1203)  SpO2: (!) 92 % (02/02/18 1203) Vital Signs (24h Range):  Temp:  [98 °F (36.7 °C)-98.9 °F (37.2 °C)] 98.2 °F (36.8 °C)  Pulse:  [83-88] 83  Resp:  [15-20] 17  SpO2:  [92 %-98 %] 92 %  BP: (101-127)/(60-70) 127/70     Weight: 98 kg (216 lb)  Body mass index is 33.83 kg/m².    Foot Exam    General  Affect: appropriate       Right Foot/Ankle     Inspection and Palpation  Tenderness: none   Swelling: none   Skin Exam: skin intact;     Neurovascular  Dorsalis pedis: 1+  Posterior tibial: 1+  Saphenous nerve sensation: diminished  Tibial nerve sensation: diminished  Superficial peroneal nerve sensation: diminished  Deep peroneal nerve sensation: diminished  Sural nerve sensation: diminished      Left Foot/Ankle      Inspection and Palpation  Tenderness: great toe interphalangeal joint   Swelling: great toe metatarsophalangeal joint   Skin Exam: drainage, skin changes and abnormal color;     Neurovascular  Dorsalis pedis: 1+  Posterior tibial: 1+  Saphenous nerve sensation: diminished  Tibial nerve sensation: diminished  Superficial peroneal nerve sensation: diminished  Deep peroneal nerve sensation: diminished  Sural nerve sensation: diminished    Comments  Sanguinous drainage with mild purulence noted surrounding draining from proximal medial aspect of wound bed.     2nd toe nail avulsed with granular nail bed, no SOI noted, stable.   Left heel decubitus ulceration                    Laboratory:  CBC:     Recent Labs  Lab 02/02/18  0359   WBC 6.15   RBC 2.57*   HGB 8.1*   HCT 25.4*      MCV 99*   MCH 31.5*   MCHC 31.9*     CMP:     Recent Labs  Lab 02/02/18  0359   GLU 97   CALCIUM 8.6*    ALBUMIN 2.2*   PROT 5.4*      K 3.6   CO2 22*   *   BUN 13   CREATININE 0.6   ALKPHOS 105   ALT 67*   AST 36   BILITOT 0.7       Diagnostic Results:  CHELE:  Impression:   Right Leg: CHELE ( 1.21) and PVR waveforms suggest no evidence of PAOD.    Left Leg: CHELE ( 1.15) and PVR waveforms suggest no evidence of PAOD.    X-Ray:No convincing acute osseous or erosive or destructive process to convincingly suggest infection although there is some erosive change involving the first metatarsophalangeal joint, suspected to be on the basis of degenerative change.  Correlation recommended, 3 phase bone scan to exclude infection as warranted.    Clinical Findings:  S/p hallax nail removal, dried hematoma with mild purulent drainage drainage, erythema.  S/p 2nd toe nail avulsion, no SOI, stable.     Assessment/Plan:     Osteomyelitis     Tona Dey is a 86 y.o. male s/p left hallux nail removal (1/30).  Mild purulence noted to wound medial nail bed. 2nd toe nail avulsed, no SOI noted, stable.   - Xray reviewed, consider MRI for hallux  - Cultures of hallux with no growth  - Dressed wounds with betadine, 4x4, kerlix, ACE  - Nursing to follow with dressing changes   - Heel protector boots to be worn     Weightbearing Status: WBAT left  Offloading Device: DARCO shoe                    Long term current use of anticoagulant therapy     Per Primary           Acute deep vein thrombosis (DVT) of popliteal vein of left lower extremity     Per Primary           Lower extremity edema     Per Primary               Lacie Dougherty MD  Podiatry  [facility]

## 2018-02-03 NOTE — PLAN OF CARE
Problem: Patient Care Overview  Goal: Plan of Care Review  Alert orient to person and place only.. Denies any complaints. VSS dressing to lt foot dry and intact.Encourage to turn every 2 hours to assist with healing of ulcer.

## 2018-02-04 LAB
ALBUMIN SERPL BCP-MCNC: 2.4 G/DL
ALP SERPL-CCNC: 102 U/L
ALT SERPL W/O P-5'-P-CCNC: 53 U/L
ANION GAP SERPL CALC-SCNC: 8 MMOL/L
AST SERPL-CCNC: 27 U/L
BASOPHILS # BLD AUTO: 0.02 K/UL
BASOPHILS NFR BLD: 0.4 %
BILIRUB SERPL-MCNC: 0.8 MG/DL
BUN SERPL-MCNC: 11 MG/DL
CALCIUM SERPL-MCNC: 8.6 MG/DL
CHLORIDE SERPL-SCNC: 113 MMOL/L
CO2 SERPL-SCNC: 23 MMOL/L
CREAT SERPL-MCNC: 0.7 MG/DL
DIFFERENTIAL METHOD: ABNORMAL
EOSINOPHIL # BLD AUTO: 0.2 K/UL
EOSINOPHIL NFR BLD: 3.1 %
ERYTHROCYTE [DISTWIDTH] IN BLOOD BY AUTOMATED COUNT: 13.6 %
EST. GFR  (AFRICAN AMERICAN): >60 ML/MIN/1.73 M^2
EST. GFR  (NON AFRICAN AMERICAN): >60 ML/MIN/1.73 M^2
GLUCOSE SERPL-MCNC: 104 MG/DL
HCT VFR BLD AUTO: 26.1 %
HGB BLD-MCNC: 8.7 G/DL
IMM GRANULOCYTES # BLD AUTO: 0.03 K/UL
IMM GRANULOCYTES NFR BLD AUTO: 0.6 %
LYMPHOCYTES # BLD AUTO: 2.4 K/UL
LYMPHOCYTES NFR BLD: 46.5 %
MAGNESIUM SERPL-MCNC: 1.7 MG/DL
MCH RBC QN AUTO: 32 PG
MCHC RBC AUTO-ENTMCNC: 33.3 G/DL
MCV RBC AUTO: 96 FL
MONOCYTES # BLD AUTO: 0.4 K/UL
MONOCYTES NFR BLD: 8.1 %
NEUTROPHILS # BLD AUTO: 2.1 K/UL
NEUTROPHILS NFR BLD: 41.3 %
NRBC BLD-RTO: 0 /100 WBC
PHOSPHATE SERPL-MCNC: 3.1 MG/DL
PLATELET # BLD AUTO: 255 K/UL
PMV BLD AUTO: 10.2 FL
POTASSIUM SERPL-SCNC: 4.2 MMOL/L
PROT SERPL-MCNC: 5.9 G/DL
RBC # BLD AUTO: 2.72 M/UL
SODIUM SERPL-SCNC: 144 MMOL/L
WBC # BLD AUTO: 5.18 K/UL

## 2018-02-04 PROCEDURE — 83735 ASSAY OF MAGNESIUM: CPT

## 2018-02-04 PROCEDURE — 36415 COLL VENOUS BLD VENIPUNCTURE: CPT

## 2018-02-04 PROCEDURE — 63600175 PHARM REV CODE 636 W HCPCS: Performed by: HOSPITALIST

## 2018-02-04 PROCEDURE — 11000001 HC ACUTE MED/SURG PRIVATE ROOM

## 2018-02-04 PROCEDURE — 80053 COMPREHEN METABOLIC PANEL: CPT

## 2018-02-04 PROCEDURE — 99232 SBSQ HOSP IP/OBS MODERATE 35: CPT | Mod: ,,, | Performed by: HOSPITALIST

## 2018-02-04 PROCEDURE — 85025 COMPLETE CBC W/AUTO DIFF WBC: CPT

## 2018-02-04 PROCEDURE — 84100 ASSAY OF PHOSPHORUS: CPT

## 2018-02-04 PROCEDURE — 25000003 PHARM REV CODE 250: Performed by: HOSPITALIST

## 2018-02-04 RX ADMIN — CIPROFLOXACIN HYDROCHLORIDE 500 MG: 250 TABLET, FILM COATED ORAL at 08:02

## 2018-02-04 RX ADMIN — KETOROLAC TROMETHAMINE 15 MG: 15 INJECTION, SOLUTION INTRAMUSCULAR; INTRAVENOUS at 08:02

## 2018-02-04 RX ADMIN — RIVAROXABAN 10 MG: 10 TABLET, FILM COATED ORAL at 05:02

## 2018-02-04 RX ADMIN — GABAPENTIN 200 MG: 100 CAPSULE ORAL at 08:02

## 2018-02-04 RX ADMIN — LIDOCAINE 2 PATCH: 50 PATCH TOPICAL at 01:02

## 2018-02-04 NOTE — NURSING
NO SIGNIFICANT CHANGES NOTED DURING TODAY'S MORNING SHIFT HEAD TO TOE ASSESSMENT IN COMPARISON TO YESTERDAY'S SHIFT MORNING HEAD TO TOE ASSESSMENT. NO NEW S/S REPORTED PER PATIENT. WILL CONTINUE TO MONITOR PATIENT'S STATUS THROUGHOUT SHIFT AND WILL DOCUMENT ANY AND ALL CHANGES AND/OR NEW FINDINGS WHEN APPLICABLE AND APPROPRIATE.

## 2018-02-04 NOTE — PROGRESS NOTES
[facility] Hospital Medicine  Progress Note    Patient Name: Tona Dey  MRN: [id number]  Patient Class: IP- Inpatient   Admission Date: 1/29/2018  Length of Stay: 6 days  Attending Physician: Davon Barry MD  Primary Care Provider: Primary Doctor [facility] Medicine Team: [facility] [place] [facility]OSP MED A Davon Barry MD    Subjective:     Principal Problem:Osteomyelitis      HPI: Mr. Tona Dey is a 86 y.o. male with Parkinson's Disease, bed bound, autonomic instability, recent LLE DVT on Eliquis, and obesity who presents to the ED because of fevers and toe pain. He mentions that for about 2 weeks, he has been having difficulty walking because of toe pain.  He denies any numbness or tingling in his feet.  He does have chronic LE swelling.  He endorses some drainage from the wound at night, but denies any foul smelling odor.  He denies any trauma to the toe.  He got antibiotics from his PCP, but is unsure of which one.  Family reports fevers up to 101.1F at home with a cough.     Patient information was obtained from patient and ER records.       Hospital Course: Admitted for 1/29 for possible osteo left foot, ulceration left great toe. Unsure of how injury occurred to left great toenail. Reports pain upon palpation right great toe nail. Per chart review patient on abx for left great toe infection per PCP. Pt's wife has various concern including arthralgias, swelling. Podiatry consulted, s/p hallax nail removal.  No signs of bone infection at this time. Will hold off on further imaging and bone biopsy at this time. No acute surgical intervention indicated at this time. CHELE's not concerning for PAD.     On 1/31, pt with more somnolence mental status, he did get Ultram this AM. He was sitting up eating breakfast this AM. No sign of infection but given mental status, will add IV cipro for soft tissue coverage. Discuss with wife, who is report that he fully extend arms, legs and feed himself 2 days ago.  But even in ED and now patient is very stiff/ridgid and would complain of pain when we try to straighten legs and arms. Wife has unrealistic expectation with what appears to be Advance/severe Parkinson Disease. Will ask neurology for further rec for treatment (pt's wife report that he was taken off all medications). Stop pain medications, start IVF for hydration. PT/OT: recommend SNF. 2/1, patient is more alert, talking and interacting with nursing team. Report LE pain on movement, lidocaine patch started, added Gabapentin. PRN toradol. Stop Ultram due to concern for AMS previously. Appreciate neuro eval and recommendation. Discuss with wife, pt is DNR DNI, no PEG tube, eval for hospice per wife request. 2/2/18, patient mental status is at baseline, alert and oriented, answer ALL questions appropriately, pain is more from skin breakdown today than hip pain. Change IV cipro to PO cipro to treat soft tissue for 14 days total. Plan to transfer to SNF awaiting approval.    Interval History: At baseline mental status today, talking to wife and interacting with team. No new physical finding.Tolerating advancing diet to dental soft per wife request. Pt has no complains       Review of Systems   Respiratory: Negative for cough, shortness of breath.    Cardiovascular: Negative for chest pain, palpitations  Gastrointestinal: Negative for abdominal pain, constipation, diarrhea, nausea, vomiting.   Endocrine: Negative for cold intolerance, heat intolerance.   Genitourinary: Negative for dysuria, frequency.   Musculoskeletal: Negative for arthralgias, myalgias.   Skin: + wound  Neurological: Negative for dizziness, syncope, weakness, light-headedness.   Psychiatric/Behavioral: Negative for confusion, hallucinations, anxiety     Objective:     Vital Signs (Most Recent):  Temp: 98.4 °F (36.9 °C) (02/04/18 0730)  Pulse: 89 (02/04/18 0730)  Resp: 20 (02/04/18 0730)  BP: (!) 155/77 (02/04/18 0730)  SpO2: 96 % (02/04/18 0730) Vital  Signs (24h Range):  Temp:  [96.1 °F (35.6 °C)-98.8 °F (37.1 °C)] 98.4 °F (36.9 °C)  Pulse:  [86-96] 89  Resp:  [17-20] 20  SpO2:  [94 %-98 %] 96 %  BP: (134-155)/(67-82) 155/77     Weight: 98 kg (216 lb)  Body mass index is 33.83 kg/m².    Intake/Output Summary (Last 24 hours) at 02/04/18 1130  Last data filed at 02/03/18 1705   Gross per 24 hour   Intake              300 ml   Output                0 ml   Net              300 ml        Physical Exam  Constitutional: Elderly, frailed male, thin  Cardiovascular: Normal rate and regular rhythm.  No murmur heard.  Pulmonary/Chest: Effort normal and breath sounds normal. No respiratory distress. No wheezes, rales, or rhonchi  Abdominal: Soft. Bowel sounds are normal.  No distension or tenderness  Musculoskeletal:  Contractures noted of both LE and UE  Skin: Skin is warm and dry. L toe nail place removed per podiatry, wrapped by podiatry      Significant Labs:   CBC:     Recent Labs  Lab 02/03/18  0735 02/04/18  0431   WBC 4.56 5.18   HGB 8.7* 8.7*   HCT 27.6* 26.1*    255     CMP:     Recent Labs  Lab 02/03/18  0735 02/04/18  0431    144   K 3.8 4.2   * 113*   CO2 25 23    104   BUN 12 11   CREATININE 0.7 0.7   CALCIUM 8.6* 8.6*   PROT 5.6* 5.9*   ALBUMIN 2.3* 2.4*   BILITOT 0.7 0.8   ALKPHOS 105 102   AST 29 27   ALT 60* 53*   ANIONGAP 7* 8   EGFRNONAA >60.0 >60.0       Significant Imaging: I have reviewed all pertinent imaging results/findings within the past 24 hours.    Assessment/Plan:      Active Diagnoses:    Diagnosis Date Noted POA    PRINCIPAL PROBLEM:  Osteomyelitis [M86.9] 01/29/2018 Yes    Arthralgia [M25.50] 02/01/2018 Unknown    Pressure injury of skin, stage 2 [L89.92] 01/31/2018 Yes    Mild cognitive impairment [G31.84] 10/28/2017 Yes    Long term current use of anticoagulant therapy [Z79.01] 10/11/2017 Not Applicable    Parkinsonism [G20] 10/07/2017 Yes    Acute deep vein thrombosis (DVT) of popliteal vein of left lower  extremity [I82.432] 10/05/2017 Yes    Lower extremity edema [R60.0] 10/02/2017 Yes    Autonomic instability [G90.9] 09/01/2017 Yes     Chronic    Obesity (BMI 30-39.9) [E66.9] 01/27/2016 Unknown      Problems Resolved During this Admission:    Diagnosis Date Noted Date Resolved POA     VTE Risk Mitigation         Ordered     rivaroxaban tablet 10 mg  With dinner     Route:  Oral        02/01/18 2052          Osteomyelitis of Left First Toe  - XR with concern for erosive changes of the left first metatarsalphalangeal joint  - ESR 58 and CRP 14 without fevers or leukocytosis  - Podiatry: s/p left hallux nail removal (1/30).    - Aerobic, anaerobic cultures pending.   - Dressed with betadine and mepilex border.  - Heel protector boots to be worn  - No acute surgical intervention indicated at this time.  - CHELE's not concerning for PAD.  - Given change in mental status 1/31, start IV cipro to cover soft tissue and OM  - Change to PO Cipro ABx for a total of 14 days End date 2/13/18    AMS  - noted on 1/31, suspected due to pain medication, we stopped all pain medication  - on my eval this AM 2/1, back to baseline at this time  - 2/2, back to baseline, tolerated diet (puree), talking and interacting with team  - change pain medication (stop Ultram), start lidocaine patches, PRN toradol   - resolved    Parkinsonism, Advance   Mild Cognitive Impairment  - Stable, pt is bedbound, per wife Sinemet 25-100mg was stopped  - pt with LE contractures, painful when extending his LE ex bilat due to eliquis? Vs progression of his PD  - Appreciate neuro eval, Wife would not like to re-start the Sinemet at this time, start Gabapentin 300 TID for current pain management; can start w/ 300 QHS (increase from 100mg QHS to 200mg)      Arthralgia   - Patient states that since the initiation of Eliquis he has experienced diffuse arthralgias; 2/2 this wife has only been giving patient medication QD rather than prescribed BID dose. This is  listed as a known side effect   - trial of Xarelto QD instead of Eliquis  - unclear if pt is complaining of joint pain or skin break down  - improvement with Lidocaine patch      Autonomic Instability  - Stable  - Per wife, Midodrine 10mg PO TID (is more of PRN, she does not give it if SBP >130?)  - BP drops when he stand but he is now bed bound    Acute DVT of LLE Popliteal Vein  Lower Extremity DVT  - US LE 1/29 without new acute DVT  - Stop eliquis, trial of Xarelto QD    Sacral Wound (present on admission)  - Wound Care consulted  - Stage 2 pressure injury noted to patient's sacral spine, appears to be healing, epithelization noted.  - dressing with sacral foam dressing and change twice a week.    - Turn every 2hr       Diet:  Adult puree  DVT PPx:  Xarelto  Goals of Care: DNR DNI after discussion with wife      Disposition: PT/OT eval, SNF with eventual transition to hospice when that time comes (wife is comfortable with this plan)   Awaiting acceptance    Davon Barry MD  Department of Hospital Medicine   [facility]

## 2018-02-05 LAB
ALBUMIN SERPL BCP-MCNC: 2.2 G/DL
ALP SERPL-CCNC: 82 U/L
ALT SERPL W/O P-5'-P-CCNC: 39 U/L
ANION GAP SERPL CALC-SCNC: 6 MMOL/L
AST SERPL-CCNC: 20 U/L
BACTERIA SPEC ANAEROBE CULT: NORMAL
BASOPHILS # BLD AUTO: 0.05 K/UL
BASOPHILS NFR BLD: 1.2 %
BILIRUB SERPL-MCNC: 0.8 MG/DL
BUN SERPL-MCNC: 12 MG/DL
CALCIUM SERPL-MCNC: 8.4 MG/DL
CHLORIDE SERPL-SCNC: 112 MMOL/L
CO2 SERPL-SCNC: 25 MMOL/L
CREAT SERPL-MCNC: 0.7 MG/DL
DIFFERENTIAL METHOD: ABNORMAL
EOSINOPHIL # BLD AUTO: 0.2 K/UL
EOSINOPHIL NFR BLD: 4.7 %
ERYTHROCYTE [DISTWIDTH] IN BLOOD BY AUTOMATED COUNT: 13.5 %
EST. GFR  (AFRICAN AMERICAN): >60 ML/MIN/1.73 M^2
EST. GFR  (NON AFRICAN AMERICAN): >60 ML/MIN/1.73 M^2
GLUCOSE SERPL-MCNC: 86 MG/DL
HCT VFR BLD AUTO: 27.6 %
HGB BLD-MCNC: 8.7 G/DL
IMM GRANULOCYTES # BLD AUTO: 0.03 K/UL
IMM GRANULOCYTES NFR BLD AUTO: 0.7 %
LYMPHOCYTES # BLD AUTO: 2 K/UL
LYMPHOCYTES NFR BLD: 46.5 %
MAGNESIUM SERPL-MCNC: 1.7 MG/DL
MCH RBC QN AUTO: 31.6 PG
MCHC RBC AUTO-ENTMCNC: 31.5 G/DL
MCV RBC AUTO: 100 FL
MONOCYTES # BLD AUTO: 0.4 K/UL
MONOCYTES NFR BLD: 9.5 %
NEUTROPHILS # BLD AUTO: 1.6 K/UL
NEUTROPHILS NFR BLD: 37.4 %
NRBC BLD-RTO: 0 /100 WBC
PHOSPHATE SERPL-MCNC: 3.1 MG/DL
PLATELET # BLD AUTO: 246 K/UL
PMV BLD AUTO: 9.8 FL
POTASSIUM SERPL-SCNC: 3.8 MMOL/L
PROT SERPL-MCNC: 5.2 G/DL
RBC # BLD AUTO: 2.75 M/UL
SODIUM SERPL-SCNC: 143 MMOL/L
WBC # BLD AUTO: 4.3 K/UL

## 2018-02-05 PROCEDURE — 83735 ASSAY OF MAGNESIUM: CPT

## 2018-02-05 PROCEDURE — 84100 ASSAY OF PHOSPHORUS: CPT

## 2018-02-05 PROCEDURE — 25000003 PHARM REV CODE 250: Performed by: HOSPITALIST

## 2018-02-05 PROCEDURE — 36415 COLL VENOUS BLD VENIPUNCTURE: CPT

## 2018-02-05 PROCEDURE — 11000001 HC ACUTE MED/SURG PRIVATE ROOM

## 2018-02-05 PROCEDURE — 80053 COMPREHEN METABOLIC PANEL: CPT

## 2018-02-05 PROCEDURE — 99238 HOSP IP/OBS DSCHRG MGMT 30/<: CPT | Mod: ,,, | Performed by: HOSPITALIST

## 2018-02-05 PROCEDURE — 85025 COMPLETE CBC W/AUTO DIFF WBC: CPT

## 2018-02-05 PROCEDURE — 92526 ORAL FUNCTION THERAPY: CPT

## 2018-02-05 RX ORDER — CIPROFLOXACIN 500 MG/1
500 TABLET ORAL EVERY 12 HOURS
Qty: 20 TABLET | Refills: 0 | Status: SHIPPED | OUTPATIENT
Start: 2018-02-05 | End: 2018-02-15

## 2018-02-05 RX ORDER — LIDOCAINE 50 MG/G
1 PATCH TOPICAL DAILY
Qty: 15 PATCH | Refills: 0 | Status: SHIPPED | OUTPATIENT
Start: 2018-02-05

## 2018-02-05 RX ORDER — GABAPENTIN 100 MG/1
200 CAPSULE ORAL NIGHTLY
Qty: 30 CAPSULE | Refills: 11
Start: 2018-02-05 | End: 2018-02-05

## 2018-02-05 RX ORDER — GABAPENTIN 100 MG/1
200 CAPSULE ORAL NIGHTLY
Qty: 90 CAPSULE | Refills: 3 | Status: SHIPPED | OUTPATIENT
Start: 2018-02-05 | End: 2019-02-05

## 2018-02-05 RX ADMIN — GABAPENTIN 200 MG: 100 CAPSULE ORAL at 08:02

## 2018-02-05 RX ADMIN — THERA TABS 1 TABLET: TAB at 11:02

## 2018-02-05 RX ADMIN — CIPROFLOXACIN HYDROCHLORIDE 500 MG: 250 TABLET, FILM COATED ORAL at 08:02

## 2018-02-05 RX ADMIN — ACETAMINOPHEN 650 MG: 325 TABLET, FILM COATED ORAL at 03:02

## 2018-02-05 RX ADMIN — RIVAROXABAN 10 MG: 10 TABLET, FILM COATED ORAL at 06:02

## 2018-02-05 RX ADMIN — LIDOCAINE 2 PATCH: 50 PATCH TOPICAL at 03:02

## 2018-02-05 NOTE — PLAN OF CARE
3:55 PM  SW received call from NurseEmy stating pt informed pharmacy he cannot pay for medications. Informed pharmacy that CM will pay 37.00 dollars for patient's medications.     Faxed cost transfer form to [facility].     Susan Rosado LMSW   [facility]  Ext [phone]

## 2018-02-05 NOTE — PROGRESS NOTES
Preparing patient for discharge.  Patient reports that pharmacy had not yet delivered his medications.  Called outpatient pharmacy.  Informed that medications were brought to room and patient did not have any money and could not pay.  Called [facility].  Request made to bill to case management.  Called outpt pharmacy and informed they will contact [facility] and re-deliver medications.  Will continue to monitor patient.

## 2018-02-05 NOTE — PROGRESS NOTES
[facility] Hospital Medicine  Progress Note    Patient Name: Tona Dey  MRN: [id number]  Patient Class: IP- Inpatient   Admission Date: 1/29/2018  Length of Stay: 7 days  Attending Physician: Davon Barry MD  Primary Care Provider: Primary Doctor [facility] Medicine Team: [facility] HOSP MED A Davon Barry MD    Subjective:     Principal Problem:Osteomyelitis      HPI: Mr. Tona Dey is a 86 y.o. male with Parkinson's Disease, bed bound, autonomic instability, recent LLE DVT on Eliquis, and obesity who presents to the ED because of fevers and toe pain. He mentions that for about 2 weeks, he has been having difficulty walking because of toe pain.  He denies any numbness or tingling in his feet.  He does have chronic LE swelling.  He endorses some drainage from the wound at night, but denies any foul smelling odor.  He denies any trauma to the toe.  He got antibiotics from his PCP, but is unsure of which one.  Family reports fevers up to 101.1F at home with a cough.     Patient information was obtained from patient and ER records.       Hospital Course: Admitted for 1/29 for possible osteo left foot, ulceration left great toe. Unsure of how injury occurred to left great toenail. Reports pain upon palpation right great toe nail. Per chart review patient on abx for left great toe infection per PCP. Pt's wife has various concern including arthralgias, swelling. Podiatry consulted, s/p hallax nail removal.  No signs of bone infection at this time. Will hold off on further imaging and bone biopsy at this time. No acute surgical intervention indicated at this time. CHELE's not concerning for PAD.     On 1/31, pt with more somnolence mental status, he did get Ultram this AM. He was sitting up eating breakfast this AM. No sign of infection but given mental status, will add IV cipro for soft tissue coverage. Discuss with wife, who is report that he fully extend arms, legs and feed himself 2 days ago.  But even in ED and now patient is very stiff/ridgid and would complain of pain when we try to straighten legs and arms. Wife has unrealistic expectation with what appears to be Advance/severe Parkinson Disease. Will ask neurology for further rec for treatment (pt's wife report that he was taken off all medications). Stop pain medications, start IVF for hydration. PT/OT: recommend SNF. 2/1, patient is more alert, talking and interacting with nursing team. Report LE pain on movement, lidocaine patch started, added Gabapentin. PRN toradol. Stop Ultram due to concern for AMS previously. Appreciate neuro eval and recommendation. Discuss with wife, pt is DNR DNI, no PEG tube, eval for hospice per wife request. 2/2/18, patient mental status is at baseline, alert and oriented, answer ALL questions appropriately, pain is more from skin breakdown today than hip pain. Change IV cipro to PO cipro to treat soft tissue for 14 days total. Plan to transfer to SNF awaiting approval. 2/5/2018, update from [facility], he has been turn down from SNF. Discuss with wife, will discharge him home with home health and AIM program. No mental status changes at this time, back to baseline. Plan to D/C home at this time      Interval History: At baseline mental status today, interacting with team. No new physical finding.Tolerating advancing diet to dental soft per wife request. Pt has no complains       Review of Systems   Respiratory: Negative for cough, shortness of breath.    Cardiovascular: Negative for chest pain, palpitations  Gastrointestinal: Negative for abdominal pain, constipation, diarrhea, nausea, vomiting.   Endocrine: Negative for cold intolerance, heat intolerance.   Genitourinary: Negative for dysuria, frequency.   Musculoskeletal: Negative for arthralgias, myalgias.   Skin: + wound  Neurological: Negative for dizziness, syncope, weakness, light-headedness.   Psychiatric/Behavioral: Negative for confusion, hallucinations, anxiety      Objective:     Vital Signs (Most Recent):  Temp: 98 °F (36.7 °C) (02/05/18 0806)  Pulse: 83 (02/05/18 0806)  Resp: 18 (02/05/18 0806)  BP: (!) 155/77 (02/05/18 0806)  SpO2: 98 % (02/05/18 0806) Vital Signs (24h Range):  Temp:  [97.7 °F (36.5 °C)-98.3 °F (36.8 °C)] 98 °F (36.7 °C)  Pulse:  [74-91] 83  Resp:  [16-19] 18  SpO2:  [96 %-99 %] 98 %  BP: (119-179)/(57-83) 155/77     Weight: 98 kg (216 lb)  Body mass index is 33.83 kg/m².    Intake/Output Summary (Last 24 hours) at 02/05/18 1054  Last data filed at 02/04/18 1350   Gross per 24 hour   Intake               60 ml   Output                0 ml   Net               60 ml        Physical Exam  Constitutional: Elderly, frailed male, thin  Cardiovascular: Normal rate and regular rhythm.  No murmur heard.  Pulmonary/Chest: Effort normal and breath sounds normal. No respiratory distress. No wheezes, rales, or rhonchi  Abdominal: Soft. Bowel sounds are normal.  No distension or tenderness  Musculoskeletal:  Contractures noted of both LE and UE  Skin: Skin is warm and dry. L toe nail place removed per podiatry, wrapped by podiatry      Significant Labs:   CBC:     Recent Labs  Lab 02/04/18  0431 02/05/18  0414   WBC 5.18 4.30   HGB 8.7* 8.7*   HCT 26.1* 27.6*    246     CMP:     Recent Labs  Lab 02/04/18  0431 02/05/18  0414    143   K 4.2 3.8   * 112*   CO2 23 25    86   BUN 11 12   CREATININE 0.7 0.7   CALCIUM 8.6* 8.4*   PROT 5.9* 5.2*   ALBUMIN 2.4* 2.2*   BILITOT 0.8 0.8   ALKPHOS 102 82   AST 27 20   ALT 53* 39   ANIONGAP 8 6*   EGFRNONAA >60.0 >60.0       Significant Imaging: I have reviewed all pertinent imaging results/findings within the past 24 hours.    Assessment/Plan:      Active Diagnoses:    Diagnosis Date Noted POA    PRINCIPAL PROBLEM:  Osteomyelitis [M86.9] 01/29/2018 Yes    Arthralgia [M25.50] 02/01/2018 Yes    Pressure injury of skin, stage 2 [L89.92] 01/31/2018 Yes    Mild cognitive impairment [G31.84] 10/28/2017 Yes     Long term current use of anticoagulant therapy [Z79.01] 10/11/2017 Not Applicable    Parkinsonism [G20] 10/07/2017 Yes    Acute deep vein thrombosis (DVT) of popliteal vein of left lower extremity [I82.432] 10/05/2017 Yes    Lower extremity edema [R60.0] 10/02/2017 Yes    Autonomic instability [G90.9] 09/01/2017 Yes     Chronic    Obesity (BMI 30-39.9) [E66.9] 01/27/2016 Yes      Problems Resolved During this Admission:    Diagnosis Date Noted Date Resolved POA     VTE Risk Mitigation         Ordered     rivaroxaban tablet 10 mg  With dinner     Route:  Oral        02/01/18 2052          Osteomyelitis of Left First Toe  - XR with concern for erosive changes of the left first metatarsalphalangeal joint  - ESR 58 and CRP 14 without fevers or leukocytosis  - Podiatry: s/p left hallux nail removal (1/30).    - Aerobic, anaerobic cultures pending.   - Dressed with betadine and mepilex border.  - Heel protector boots to be worn  - No acute surgical intervention indicated at this time.  - CHELE's not concerning for PAD.  - Given change in mental status 1/31, start IV cipro to cover soft tissue and OM  - Change to PO Cipro ABx for a total of 14 days End date 2/15/18    AMS  - noted on 1/31, suspected due to pain medication, we stopped all pain medication  - on my eval this AM 2/1, back to baseline at this time  - 2/2, back to baseline, tolerated diet talking and interacting with team  - change pain medication (stop Ultram), start lidocaine patches, PRN toradol   - resolved      Parkinsonism, Advance   Mild Cognitive Impairment  - Stable, pt is bedbound, per wife Sinemet 25-100mg was stopped  - pt with LE contractures, painful when extending his LE ex bilat due to eliquis? Vs progression of his PD  - Appreciate neuro eval, Wife would not like to re-start the Sinemet at this time, start Gabapentin 300 TID for current pain management; can start w/ 300 QHS (increase from 100mg QHS to 200mg)    Arthralgia   - Patient  states that since the initiation of Eliquis he has experienced diffuse arthralgias; 2/2 this wife has only been giving patient medication QD rather than prescribed BID dose. This is listed as a known side effect   - trial of Xarelto QD instead of Eliquis  - unclear if pt is complaining of joint pain or skin break down  - improvement with Lidocaine patch    Autonomic Instability  - Stable  - Per wife, Midodrine 10mg PO TID (is more of PRN, she does not give it if SBP >130?)  - BP drops when he stand but he is now bed bound    Acute DVT of LLE Popliteal Vein  Lower Extremity DVT  - US LE 1/29 without new acute DVT  - Stop eliquis, trial of Xarelto QD    Sacral Wound (present on admission)  - Wound Care consulted  - Stage 2 pressure injury noted to patient's sacral spine, appears to be healing, epithelization noted.  - dressing with sacral foam dressing and change twice a week.    - Turn every 2hr       Diet:  Adult  Dental soft  DVT PPx:  Xarelto  Goals of Care: DNR DNI after discussion with wife      Disposition: D/C home with home health today    Davon Barry MD  Department of Hospital Medicine   [facility]

## 2018-02-05 NOTE — PT/OT/SLP PROGRESS
Speech Language Pathology Treatment  Discharge Summary    Patient Name:  Tona Dey   MRN:  [id number]  Admitting Diagnosis: Osteomyelitis    Recommendations:                 General Recommendations:  Follow-up not indicated  Diet recommendations:  Dental Soft, Liquid Diet Level: Thin   Aspiration Precautions: 1 bite/sip at a time, Alternating bites/sips  1:1 Assistance with meals, Check for pocketing/oral residue, Feed only when awake/alert, HOB to 90 degrees and Small bites/sips   General Precautions: Standard, fall  Communication strategies:  none    Subjective   Awake & alert. Pt found set up with meal tray. Pt reports having difficulty feeding self & requesting assistance. NSG reports pt tolerating current diet & meds whole. PCT was called to room to feed pt when sLP was done with session.     Pain/Comfort:  · Pain Rating 1: 8/10  · Location 1:  (buttocks)  · Pain Addressed 2: Distraction  · Pain Rating Post-Intervention 2: 8/10    Objective:     Has the patient been evaluated by SLP for swallowing?   Yes  Keep patient NPO? No   Current Respiratory Status: room air      Pt tolerated bites of dental soft x4 requiring min verbal cueing to clear oral cavity between bites, no s/s aspiration. Pt tolerated sips of thin x5 via straw with no s/s aspiration. SLP reviewed recs & swallow precautions/strategies, pt recalled given min A.     Assessment:     Tona Dey is a 86 y.o. male with an SLP diagnosis of oropharyngeal swallow appears WFL. Pt tolerating current diet, no further ST service warranted at this time for swallowing.     Goals:    SLP Goals     Not on file          Multidisciplinary Problems (Resolved)        Problem: SLP Goal    Goal Priority Disciplines Outcome   SLP Goal   (Resolved)     SLP Outcome(s) achieved   Description:  Speech Language Pathology Goals  Goals expected to be met by 2/8  1. Ongoing swallow assessment GOAL MET                       Plan:     · Patient to be seen:  4 x/week   · Plan  of Care expires:  03/02/18  · Plan of Care reviewed with:  patient   · SLP Follow-Up:  No       Discharge recommendations:  nursing facility, skilled     Time Tracking:     SLP Treatment Date:   02/05/18  Speech Start Time:  1350  Speech Stop Time:  1401     Speech Total Time (min):  11 min    Billable Minutes: Treatment Swallowing Dysfunction 11    Manda Hernandez CCC-SLP  02/05/2018   [phone]

## 2018-02-05 NOTE — PLAN OF CARE
CURT Avila sent hh referral via [facility]  See her notes please  Cm spoke with wife, , who prefers to make pt's f/u appt with DR. Marilu Mcginnis  [address] [id number]    Pt's wife will make appt since she has to make appt around her appts for therapy.     02/05/18 1115   Final Note   Assessment Type Final Discharge Note   Discharge Disposition Home-Health   Hospital Follow Up  Appt(s) scheduled? Yes  (per wife)   Discharge plans and expectations educations in teach back method with documentation complete? Yes   Right Care Referral Info   Post Acute Recommendation Home-care

## 2018-02-05 NOTE — PLAN OF CARE
AM SHIFT'S PLAN OF CARE INTERVENTIONS    UNABLE TO REVIEW PLAN OF CARE WITH PATIENT RELATED TO COGNITIVE IMPAIRMENT/ALTERED MENTAL STATUS    FALL RISK MANAGEMENT-SAFETY METHODS MAINTAINED; NO INJURIES/FALLS OCCURRED    PRESSURE ULCER PREVENTION MANAGEMENT-FREQUENT BODY POSITION CHANGES PERFORMED;PILLOWS/FOAM WEDGE IN USE;BARRIER CREAM APPLIED    INCONTINENCE CARE MANAGEMENT-PARTIAL BATH GIVEN;INCONTINENCE PADS/DIAPERS IN USE    IMPAIRED MOBILITY MANAGEMENT-TRANSFER/AMBULATION ASSISTANCE PROVIDED;ASSISTIVE DEVICE IN USE    INFECTION MANAGEMENT-ANTIBIOTICS ADMINISTERED;FREQUENT HANDWASHING PERFORMED;UNIVERSAL/STANDARD PRECAUTIONS MAINTAINED    IMPAIRED NUTRITION MANAGEMENT-ORAL INTAKE ENCOURAGED    ACUTE/CHRONIC PAIN MANAGEMENT-SCHEDULED/PRN PAIN MEDICATION ADMINISTERED     NO FAMILY/VISITORS NOTED DURING SHIFT

## 2018-02-05 NOTE — SUBJECTIVE & OBJECTIVE
Interval Hx:  Patient s/p left hallux avulsion.Relates to mild pain. No other pedal complaints at this time. 2nd toe nail avulsed after he bumped it during physical therapy session.     Scheduled Meds:   ciprofloxacin HCl  500 mg Oral Q12H    gabapentin  200 mg Oral QHS    lidocaine  2 patch Transdermal Q24H    multivitamin  1 tablet Oral Daily    neomycin-bacitracin-polymyxin   Topical (Top) Daily    rivaroxaban  10 mg Oral Daily with dinner    tuberculin  5 Units Intradermal Once     Continuous Infusions:    PRN Meds:acetaminophen, dextrose 50%, dextrose 50%, glucagon (human recombinant), glucose, glucose, ondansetron, sodium chloride 0.9%    Review of patient's allergies indicates:  No Known Allergies     Past Medical History:   Diagnosis Date    Anemia     Chronic back pain     Hypertension     Lumbago     Orthostatic hypotension dysautonomic syndrome     Parkinson disease     Renal disorder      Past Surgical History:   Procedure Laterality Date    BACK SURGERY  1961    ROTATOR CUFF REPAIR         Family History     Problem Relation (Age of Onset)    Cancer Father    No Known Problems Mother        Social History Main Topics    Smoking status: Former Smoker     Quit date: 8/25/1985    Smokeless tobacco: Never Used    Alcohol use 1.2 oz/week     2 Shots of liquor per week    Drug use: Unknown    Sexual activity: Yes     Partners: Female     Review of Systems   Constitutional: Positive for activity change.   Respiratory: Negative for shortness of breath.    Cardiovascular: Negative for chest pain and leg swelling.   Gastrointestinal: Negative for nausea and vomiting.   Genitourinary: Negative.    Musculoskeletal: Positive for arthralgias and myalgias.   Skin: Positive for color change.   Neurological: Positive for numbness. Negative for weakness.   Psychiatric/Behavioral: Negative.      Objective:     Vital Signs (Most Recent):  Temp: 98.5 °F (36.9 °C) (02/05/18 1632)  Pulse: 80 (02/05/18  1632)  Resp: 18 (02/05/18 1632)  BP: 137/72 (02/05/18 1632)  SpO2: 98 % (02/05/18 1632) Vital Signs (24h Range):  Temp:  [97.7 °F (36.5 °C)-98.5 °F (36.9 °C)] 98.5 °F (36.9 °C)  Pulse:  [74-91] 80  Resp:  [16-19] 18  SpO2:  [96 %-98 %] 98 %  BP: (119-155)/(57-77) 137/72     Weight: 98 kg (216 lb)  Body mass index is 33.83 kg/m².    Foot Exam    General  Affect: appropriate       Right Foot/Ankle     Inspection and Palpation  Tenderness: none   Swelling: none   Skin Exam: skin intact;     Neurovascular  Dorsalis pedis: 1+  Posterior tibial: 1+  Saphenous nerve sensation: diminished  Tibial nerve sensation: diminished  Superficial peroneal nerve sensation: diminished  Deep peroneal nerve sensation: diminished  Sural nerve sensation: diminished      Left Foot/Ankle      Inspection and Palpation  Tenderness: great toe interphalangeal joint   Swelling: great toe metatarsophalangeal joint   Skin Exam: drainage, skin changes and abnormal color;     Neurovascular  Dorsalis pedis: 1+  Posterior tibial: 1+  Saphenous nerve sensation: diminished  Tibial nerve sensation: diminished  Superficial peroneal nerve sensation: diminished  Deep peroneal nerve sensation: diminished  Sural nerve sensation: diminished    Comments  Sanguinous drainage with mild purulence noted surrounding draining from proximal medial aspect of wound bed.     2nd toe nail avulsed with granular nail bed, no SOI noted, stable.   Left heel decubitus ulceration                      Laboratory:  CBC:     Recent Labs  Lab 02/05/18  0414   WBC 4.30   RBC 2.75*   HGB 8.7*   HCT 27.6*      *   MCH 31.6*   MCHC 31.5*     CMP:     Recent Labs  Lab 02/05/18  0414   GLU 86   CALCIUM 8.4*   ALBUMIN 2.2*   PROT 5.2*      K 3.8   CO2 25   *   BUN 12   CREATININE 0.7   ALKPHOS 82   ALT 39   AST 20   BILITOT 0.8       Diagnostic Results:  CHELE:  Impression:   Right Leg: CHELE ( 1.21) and PVR waveforms suggest no evidence of PAOD.    Left Leg: CHELE (  1.15) and PVR waveforms suggest no evidence of PAOD.    X-Ray:No convincing acute osseous or erosive or destructive process to convincingly suggest infection although there is some erosive change involving the first metatarsophalangeal joint, suspected to be on the basis of degenerative change.  Correlation recommended, 3 phase bone scan to exclude infection as warranted.    Clinical Findings:  S/p hallax nail removal, dried hematoma with mild purulent drainage drainage, erythema.  S/p 2nd toe nail avulsion, no SOI, stable.

## 2018-02-05 NOTE — PLAN OF CARE
10:17 AM  SW called [facility] representative, Yasmin who stated pt has been denied SNF. Informed physician. Received voicemail from spouse stating she wanted pt to come home with home health.     Susan Rosado LMSW   [facility]  Ext [phone]

## 2018-02-05 NOTE — PLAN OF CARE
2:15 PM  [facility] arranged transportation with [facility]. [facility] will arrive within the hour.     Informed RN, Emy.    Susan Rosado, DAX   [facility]  Ext [phone]

## 2018-02-05 NOTE — PLAN OF CARE
Problem: Patient Care Overview  Goal: Plan of Care Review  Patient turned q2 to prevent further pressure injury.  Patient given applesauce with medication and no signs of dysphagia/coughing with administration of medication.

## 2018-02-05 NOTE — PLAN OF CARE
[facility]     Department of Hospital Medicine     [address] [place] [address]     [phone] after hours  [phone] fax       NURSING HOME ORDERS    02/05/2018    Admit to Nursing Home:  Skilled Bed                                                   Diagnoses:  Active Hospital Problems    Diagnosis  POA    *Osteomyelitis [M86.9]  Yes    Arthralgia [M25.50]  Yes    Pressure injury of skin, stage 2 [L89.92]  Yes    Mild cognitive impairment [G31.84]  Yes    Long term current use of anticoagulant therapy [Z79.01]  Not Applicable    Parkinsonism [G20]  Yes    Acute deep vein thrombosis (DVT) of popliteal vein of left lower extremity [I82.432]  Yes    Lower extremity edema [R60.0]  Yes    Autonomic instability [G90.9]  Yes     Chronic    Obesity (BMI 30-39.9) [E66.9]  Yes      Resolved Hospital Problems    Diagnosis Date Resolved POA   No resolved problems to display.       Patient is homebound due to:  Osteomyelitis, Parkisonism, Autonomic instability, bedbound    Patient is DNR / DNI    Allergies:Review of patient's allergies indicates:  No Known Allergies    Vitals:      Every shift (Skilled Nursing patients)      Diet: Puree diet   Supplement:  1 can every three times a day with meals                         Type:  Glucerna    Activities:     - Up in a chair each morning as tolerated     LABS:  Per facility protocol    Nursing Precautions:    - Aspiration precautions:             - Total assistance with meals            -  Upright 90 degrees befor during and after meals             -  Suction at bedside          - Fall precautions per nursing home protocol   - Seizure precaution per retirement protocol   - Decubitus precautions:        -  for positioning   - Pressure reducing foam mattress   - Turn patient every two hours. Use wedge pillows to anchor patient    CONSULTS:     Physical Therapy to evaluate and treat     Occupational Therapy  to evaluate and treat      MISCELLANEOUS CARE:     Routine Skin for Bedridden Patients:  Apply moisture barrier cream to all    skin folds and wet areas in perineal area daily and after baths and                           all bowel movements.    WOUND CARE:  Wound spray or saline for wound cleaning with all dressing changes.    All wounds to be measured with first dressing changes and every week.    Pressure Injury 01/29/18 2000 Sacral spine Stage 2, appears to be healing, epithelization noted.   Recommend dressing with sacral foam dressing and change twice a week.   Low air loss overlay for patient.   Turn every 2hrs.    Left big toe, 2nd toe nail avulsed   - Dressed wounds with betadine, 4x4, kerlix, ACE  - Nursing to follow with dressing changes  - Heel protector boots to be worn     Weightbearing Status: WBAT left  Offloading Device: DARCO shoe         Medications: Discontinue all previous medication orders, if any. See new list below.   Tona Dey   Home Medication Instructions JOSE:[id number]    Printed on:02/05/18 7299   Medication Information                      acetaminophen (TYLENOL) 325 MG tablet  Take 2 tablets (650 mg total) by mouth every 4 (four) hours as needed for Pain.             ciprofloxacin HCl (CIPRO) 500 MG tablet  Take 1 tablet (500 mg total) by mouth every 12 (twelve) hours. End date 2/13/18             gabapentin (NEURONTIN) 100 MG capsule  Take 2 capsules (200 mg total) by mouth every evening. Titrate up to 3 times a day if needed             lidocaine (LIDODERM) 5 %  Place 2 patches onto the skin once daily. Remove & Discard patch within 12 hours or as directed by MD             magnesium hydroxide 400 mg/5 ml (MILK OF MAGNESIA) 400 mg/5 mL Susp  Take 30 mLs by mouth daily as needed (constipation).             midodrine (PROAMATINE) 10 MG tablet  Take 1 tablet (10 mg total) by mouth 3 (three) times daily as needed (if BP >110).             multivitamin (THERAGRAN) tablet  Take 1  tablet by mouth once daily.             ranitidine (ZANTAC) 300 MG tablet  Take 300 mg by mouth daily as needed for Heartburn.             rivaroxaban (XARELTO) 10 mg Tab  Take 1 tablet (10 mg total) by mouth daily with dinner or evening meal.                   Signing Physician     Davon Barry MD  Hospital Medicine Staff  Department of Hospital Medicine [facility]  2/5/2018

## 2018-02-05 NOTE — PHYSICIAN QUERY
PT Name: Tona Dey  MR #: [id number]    Physician Query Form - Neurological Condition Clarification       CDS/: Kb Dallas RN               Contact information:   [email or web address]    This form is a permanent document in the medical record.     Query Date: February 5, 2018    By submitting this query, we are merely seeking further clarification of documentation. Please utilize your independent clinical judgment when addressing the question(s) below.    The Medical record contains the following:   Indicators   Supporting Clinical Findings Location in Medical Record   x AMS, Confusion, LOC, etc. AMS - noted on 1/31, suspected due to pain medication, we stopped all pain medication - on my eval this AM 2/1, back to baseline at this time Progress 2/1- Hosp Med   x Acute / Chronic Illness Advanced Pardinsonism, arthralgia, Osteomyelitis Progress 2/1- Hosp. Med    Radiology Findings     x Electrolyte Imbalance Calcium: 9.1>>8.4 Labs 1/29- 2/5    Medication      Treatment     x Other Psychiatric/Behavioral: Negative for confusion, hallucinations, anxiety    On 1/31, pt with more somnolence mental status, he did get Ultram this AM. He was sitting up eating breakfast this AM. No sign of infection but given mental status, will add IV cipro for soft tissue coverage....lidocaine patch started, added Gabapentin. PRN toradol. Stop Ultram due to concern for AMS previously H&P 1/29      Progress 2/1- Hosp. Med     Provider, please specify the diagnosis or diagnoses associated with above clinical findings.    [  ] Toxic Encephalopathy    [  ] Other Encephalopathy    [xsxx  ] Unspecified Encephalopathy    [  ] Other (please specify): _____________________________________    [  ] Clinically Undetermined      Please document in your progress notes daily for the duration of treatment until resolved, and  include in your discharge summary.

## 2018-02-05 NOTE — PLAN OF CARE
Problem: Patient Care Overview  Goal: Plan of Care Review  Outcome: Unable to achieve outcome(s) by discharge  Patient not trying to get OOB and bed alarm on.  Patient displays some breakdown to buttocks.  Spoke with patient's wife and patient anticipated to dc home today via [facility].

## 2018-02-05 NOTE — ASSESSMENT & PLAN NOTE
Tona Dey is a 86 y.o. male s/p left hallux nail removal (1/30).  Painted with betadine covered with mepilex border. Nursing orders placed for daily dressing changes.   -Heel protector boots to be worn  -No acute surgical intervention indicated at this time.  -CHELE's not concerning for PAD.    Weightbearing Status: WBAT left  Offloading Device: DARCO shoe       Brie Lowery DPM PGY-3  Pager: [phone]

## 2018-02-05 NOTE — PLAN OF CARE
[facility]     Department of Hospital Medicine     [address] [place] [address]     [phone] after hours  [phone] fax       NURSING HOME ORDERS    02/05/2018    Admit to Nursing Home:  Regular Bed         Diagnoses:  Active Hospital Problems    Diagnosis  POA    *Osteomyelitis [M86.9]  Yes    Arthralgia [M25.50]  Yes    Pressure injury of skin, stage 2 [L89.92]  Yes    Mild cognitive impairment [G31.84]  Yes    Long term current use of anticoagulant therapy [Z79.01]  Not Applicable    Parkinsonism [G20]  Yes    Acute deep vein thrombosis (DVT) of popliteal vein of left lower extremity [I82.432]  Yes    Lower extremity edema [R60.0]  Yes    Autonomic instability [G90.9]  Yes     Chronic    Obesity (BMI 30-39.9) [E66.9]  Yes      Resolved Hospital Problems    Diagnosis Date Resolved POA   No resolved problems to display.       Patient is homebound due to:  Osteomyelitis, Parkisonism, Autonomic instability, bedbound    Patient is DNR / DNI    Allergies:Review of patient's allergies indicates:  No Known Allergies    Vitals:       Once weekly       Diet: Dental Soft diet   Supplement:  1 can every three times a day with meals                         Type:  Glucerna    Activities:    - Up in a chair each morning as tolerated  - Weightbearing Status: WBAT left    LABS:  Per facility protocol     Nursing Precautions:    - Aspiration precautions:               - Total assistance with meals              -  Upright 90 degrees befor during and after meals               -  Suction at bedside          - Fall precautions per nursing home protocol   - Seizure precaution per care home protocol   - Decubitus precautions:              -  for positioning              - Pressure reducing foam mattress              - Turn patient every two hours. Use wedge pillows to anchor patient     CONSULTS:                Physical Therapy to evaluate and treat                  Occupational Therapy to evaluate and treat        MISCELLANEOUS CARE:                 Routine Skin for Bedridden Patients:  Apply moisture barrier cream to all                          skin folds and wet areas in perineal area daily and after baths and                           all bowel movements.     WOUND CARE:  Wound spray or saline for wound cleaning with all dressing changes.    All wounds to be measured with first dressing changes and every week.     Pressure Injury 01/29/18 2000 Sacral spine Stage 2, appears to be healing, epithelization noted.   Recommend dressing with sacral foam dressing and change twice a week.   Low air loss overlay for patient.   Turn every 2hrs.         Left big toe, 2nd toe nail avulsed   - Dressed wounds with betadine, 4x4, kerlix, ACE  - Nursing to follow with dressing changes  - Heel protector boots to be worn     Weightbearing Status: WBAT left  Offloading Device: DARCO shoe            Medications: Discontinue all previous medication orders, if any. See new list below.     Tona Dey   Home Medication Instructions JOSE:[id number]    Printed on:02/05/18 0981   Medication Information                      acetaminophen (TYLENOL) 325 MG tablet  Take 2 tablets (650 mg total) by mouth every 4 (four) hours as needed for Pain.             ciprofloxacin HCl (CIPRO) 500 MG tablet  Take 1 tablet (500 mg total) by mouth every 12 (twelve) hours. End date 2/13/18             gabapentin (NEURONTIN) 100 MG capsule  Take 2 capsules (200 mg total) by mouth every evening. Titrate up to 3 times a day if needed             lidocaine (LIDODERM) 5 %  Place 2 patches onto the skin once daily. Remove & Discard patch within 12 hours or as directed by MD             magnesium hydroxide 400 mg/5 ml (MILK OF MAGNESIA) 400 mg/5 mL Susp  Take 30 mLs by mouth daily as needed (constipation).             midodrine (PROAMATINE) 10 MG tablet  Take 1 tablet (10 mg total) by mouth 3 (three) times daily  as needed (if BP >110).             multivitamin (THERAGRAN) tablet  Take 1 tablet by mouth once daily.             ranitidine (ZANTAC) 300 MG tablet  Take 300 mg by mouth daily as needed for Heartburn.             rivaroxaban (XARELTO) 10 mg Tab  Take 1 tablet (10 mg total) by mouth daily with dinner or evening meal.                       _________________________________  Davon Barry MD  02/05/2018

## 2018-02-05 NOTE — PLAN OF CARE
10:32 AM  SW received home health orders. Faxed orders to [facility]. Informed Aimee with [facility] of home health orders.   10:42 AM  SW called spouse to inform her of discharge today. Spouse stated pt will need transportation with [facility]. Will set up transport once medications delivered. Called pharmacy and informed pharmacy tech that pt will need bedside delivery.     Susan Rosado LMSW   [facility]  Ext [phone]

## 2018-02-05 NOTE — PLAN OF CARE
Problem: SLP Goal  Goal: SLP Goal  Speech Language Pathology Goals  Goals expected to be met by 2/8  1. Ongoing swallow assessment GOAL MET     Outcome: Outcome(s) achieved Date Met: 02/05/18  pT WAS SEEN FOR st SESSION.     Manda Hernandez MS, CCC-SLP  Speech Language Pathologist  Pager: [phone]  2/5/2018

## 2018-02-05 NOTE — DISCHARGE SUMMARY
[facility] Hospital Medicine  Discharge Summary      Patient Name: Tona Dey  MRN: [id number]  Admission Date: 1/29/2018  Hospital Length of Stay: 7 days  Discharge Date and Time:  02/05/2018 2:13 PM  Attending Physician: Davon Barry MD   Discharging Provider: Davon Barry MD  Primary Care Provider: Primary Doctor [facility] Medicine Team: [facility]OS[facility] MED A Davon Barry MD    HPI:   Mr. Tona Dey is a 86 y.o. male with Parkinson's Disease, bed bound, autonomic instability, recent LLE DVT on Eliquis, and obesity who presents to the ED because of fevers and toe pain. He mentions that for about 2 weeks, he has been having difficulty walking because of toe pain.  He denies any numbness or tingling in his feet.  He does have chronic LE swelling.  He endorses some drainage from the wound at night, but denies any foul smelling odor.  He denies any trauma to the toe.  He got antibiotics from his PCP, but is unsure of which one.  Family reports fevers up to 101.1F at home with a cough.     * No surgery found *      Hospital Course: Admitted for 1/29 for possible osteo left foot, ulceration left great toe. Unsure of how injury occurred to left great toenail. Reports pain upon palpation right great toe nail. Per chart review patient on abx for left great toe infection per PCP. Pt's wife has various concern including arthralgias, swelling. Podiatry consulted, s/p hallax nail removal.  No signs of bone infection at this time. Will hold off on further imaging and bone biopsy at this time. No acute surgical intervention indicated at this time. CHELE's not concerning for PAD.      On 1/31, pt with more somnolence mental status, he did get Ultram this AM. He was sitting up eating breakfast this AM. No sign of infection but given mental status, will add IV cipro for soft tissue coverage. Discuss with wife, who is report that he fully extend arms, legs and feed himself 2 days ago. But even in ED and  now patient is very stiff/ridgid and would complain of pain when we try to straighten legs and arms. Wife has unrealistic expectation with what appears to be Advance/severe Parkinson Disease. Will ask neurology for further rec for treatment (pt's wife report that he was taken off all medications). Stop pain medications, start IVF for hydration. PT/OT: recommend SNF. 2/1, patient is more alert, talking and interacting with nursing team. Report LE pain on movement, lidocaine patch started, added Gabapentin. PRN toradol. Stop Ultram due to concern for AMS previously. Appreciate neuro eval and recommendation. Discuss with wife, pt is DNR DNI, no PEG tube, eval for hospice per wife request. 2/2/18, patient mental status is at baseline, alert and oriented, answer ALL questions appropriately, pain is more from skin breakdown today than hip pain. Change IV cipro to PO cipro to treat soft tissue for 14 days total. Plan to transfer to SNF awaiting approval. 2/5/2018, update from , he has been turn down from SNF. Discuss with wife, will discharge him home with home health and AIM program. No mental status changes at this time, back to baseline. Plan to D/C home at this time      Consults:   Consults         Status Ordering Provider     Inpatient consult to Midline team  Once     Provider:  (Not yet assigned)    Completed MEL MASTERSON     Inpatient consult to Neurology  Once     Provider:  (Not yet assigned)    Completed MEL MASTERSON     Inpatient consult to PICC team ([facility])  Once     Provider:  (Not yet assigned)    Completed MEL MASTERSON     Inpatient consult to Podiatry  Once     Provider:  (Not yet assigned)    Completed MAYUR WARD          Assessment & Plan     Osteomyelitis of Left First Toe  - XR with concern for erosive changes of the left first metatarsalphalangeal joint  - ESR 58 and CRP 14 without fevers or leukocytosis  - Podiatry: s/p left hallux nail removal (1/30).    - Aerobic, anaerobic  cultures pending.   - Dressed with betadine and mepilex border.  - Heel protector boots to be worn  - No acute surgical intervention indicated at this time.  - CHELE's not concerning for PAD.  - Given change in mental status 1/31, start IV cipro to cover soft tissue and OM  - Change to PO Cipro ABx for a total of 14 days End date 2/15/18     AMS  - noted on 1/31, suspected due to pain medication, we stopped all pain medication  - on my eval this AM 2/1, back to baseline at this time  - 2/2, back to baseline, tolerated diet talking and interacting with team  - change pain medication (stop Ultram), start lidocaine patches, PRN toradol   - resolved        Parkinsonism, Advance   Mild Cognitive Impairment  - Stable, pt is bedbound, per wife Sinemet 25-100mg was stopped  - pt with LE contractures, painful when extending his LE ex bilat due to eliquis? Vs progression of his PD  - Appreciate neuro eval, Wife would not like to re-start the Sinemet at this time, start Gabapentin 300 TID for current pain management; can start w/ 300 QHS (increase from 100mg QHS to 200mg)     Arthralgia   - Patient states that since the initiation of Eliquis he has experienced diffuse arthralgias; 2/2 this wife has only been giving patient medication QD rather than prescribed BID dose. This is listed as a known side effect   - trial of Xarelto QD instead of Eliquis  - unclear if pt is complaining of joint pain or skin break down  - improvement with Lidocaine patch     Autonomic Instability  - Stable  - Per wife, Midodrine 10mg PO TID (is more of PRN, she does not give it if SBP >130?)  - BP drops when he stand but he is now bed bound     Acute DVT of LLE Popliteal Vein  Lower Extremity DVT  - US LE 1/29 without new acute DVT  - Stop eliquis, trial of Xarelto QD     Sacral Wound (present on admission)  - Wound Care consulted  - Stage 2 pressure injury noted to patient's sacral spine, appears to be healing, epithelization noted.  - dressing with  sacral foam dressing and change twice a week.    - Turn every 2hr    DNR DNI after discussion with wife   D/C home with home health and AIM program      Final Active Diagnoses:    Diagnosis Date Noted POA    PRINCIPAL PROBLEM:  Osteomyelitis [M86.9] 01/29/2018 Yes    Arthralgia [M25.50] 02/01/2018 Yes    Pressure injury of skin, stage 2 [L89.92] 01/31/2018 Yes    Mild cognitive impairment [G31.84] 10/28/2017 Yes    Long term current use of anticoagulant therapy [Z79.01] 10/11/2017 Not Applicable    Parkinsonism [G20] 10/07/2017 Yes    Acute deep vein thrombosis (DVT) of popliteal vein of left lower extremity [I82.432] 10/05/2017 Yes    Lower extremity edema [R60.0] 10/02/2017 Yes    Autonomic instability [G90.9] 09/01/2017 Yes     Chronic    Obesity (BMI 30-39.9) [E66.9] 01/27/2016 Yes      Problems Resolved During this Admission:    Diagnosis Date Noted Date Resolved POA       Discharged Condition: stable    Disposition: Home or Self Care    Follow Up:    Patient Instructions:     Activity as tolerated         Significant Diagnostic Studies: Labs:   CMP   Recent Labs  Lab 02/04/18  0431 02/05/18  0414    143   K 4.2 3.8   * 112*   CO2 23 25    86   BUN 11 12   CREATININE 0.7 0.7   CALCIUM 8.6* 8.4*   PROT 5.9* 5.2*   ALBUMIN 2.4* 2.2*   BILITOT 0.8 0.8   ALKPHOS 102 82   AST 27 20   ALT 53* 39   ANIONGAP 8 6*   ESTGFRAFRICA >60.0 >60.0   EGFRNONAA >60.0 >60.0    and CBC   Recent Labs  Lab 02/04/18  0431 02/05/18  0414   WBC 5.18 4.30   HGB 8.7* 8.7*   HCT 26.1* 27.6*    246       Pending Diagnostic Studies:     None         Medications:  Reconciled Home Medications:   Current Discharge Medication List      START taking these medications    Details   ciprofloxacin HCl (CIPRO) 500 MG tablet Take 1 tablet (500 mg total) by mouth every 12 (twelve) hours. End date 2/15/18  Qty: 20 tablet, Refills: 0      gabapentin (NEURONTIN) 100 MG capsule Take 2 capsules (200 mg total) by mouth  every evening. Titrate up to 3 times a day if needed  Qty: 90 capsule, Refills: 3      lidocaine (LIDODERM) 5 % Place 1 patch onto the skin once daily. Remove & Discard patch within 12 hours or as directed by MD  Qty: 15 patch, Refills: 0      multivitamin (THERAGRAN) tablet Take 1 tablet by mouth once daily.      rivaroxaban (XARELTO) 10 mg Tab Take 1 tablet (10 mg total) by mouth daily with dinner or evening meal.  Qty: 30 tablet, Refills: 4         CONTINUE these medications which have CHANGED    Details   midodrine (PROAMATINE) 10 MG tablet Take 1 tablet (10 mg total) by mouth 3 (three) times daily as needed (if BP >110).         CONTINUE these medications which have NOT CHANGED    Details   acetaminophen (TYLENOL) 325 MG tablet Take 2 tablets (650 mg total) by mouth every 4 (four) hours as needed for Pain.  Qty: 30 tablet, Refills: 0      magnesium hydroxide 400 mg/5 ml (MILK OF MAGNESIA) 400 mg/5 mL Susp Take 30 mLs by mouth daily as needed (constipation).      ranitidine (ZANTAC) 300 MG tablet Take 300 mg by mouth daily as needed for Heartburn.         STOP taking these medications       apixaban (ELIQUIS) 5 mg Tab Comments:   Reason for Stopping:         traMADol (ULTRAM) 50 mg tablet Comments:   Reason for Stopping:         carbidopa-levodopa  mg (SINEMET)  mg per tablet Comments:   Reason for Stopping:         warfarin (COUMADIN) 5 MG tablet Comments:   Reason for Stopping:         warfarin (COUMADIN) 7.5 MG tablet Comments:   Reason for Stopping:               Indwelling Lines/Drains at time of discharge:   Lines/Drains/Airways     Pressure Ulcer                 Pressure Injury 01/29/18 2000 Sacral spine Stage 2 6 days                Time spent on the discharge of patient: <30 minutes  Patient was seen and examined on the date of discharge and determined to be suitable for discharge.         Davon Barry MD  Department of Hospital Medicine  [facility]

## 2018-02-05 NOTE — PROGRESS NOTES
[facility]  Podiatry  Progress Note    Patient Name: Tona Dey  MRN: [id number]  Admission Date: 1/29/2018  Hospital Length of Stay: 7 days  Attending Physician: Davon Barry MD  Primary Care Provider: Primary Doctor No   Interval Hx:  Patient s/p left hallux avulsion.Relates to mild pain. No other pedal complaints at this time. 2nd toe nail avulsed after he bumped it during physical therapy session.     Scheduled Meds:   ciprofloxacin HCl  500 mg Oral Q12H    gabapentin  200 mg Oral QHS    lidocaine  2 patch Transdermal Q24H    multivitamin  1 tablet Oral Daily    neomycin-bacitracin-polymyxin   Topical (Top) Daily    rivaroxaban  10 mg Oral Daily with dinner    tuberculin  5 Units Intradermal Once     Continuous Infusions:    PRN Meds:acetaminophen, dextrose 50%, dextrose 50%, glucagon (human recombinant), glucose, glucose, ondansetron, sodium chloride 0.9%    Review of patient's allergies indicates:  No Known Allergies     Past Medical History:   Diagnosis Date    Anemia     Chronic back pain     Hypertension     Lumbago     Orthostatic hypotension dysautonomic syndrome     Parkinson disease     Renal disorder      Past Surgical History:   Procedure Laterality Date    BACK SURGERY  1961    ROTATOR CUFF REPAIR         Family History     Problem Relation (Age of Onset)    Cancer Father    No Known Problems Mother        Social History Main Topics    Smoking status: Former Smoker     Quit date: 8/25/1985    Smokeless tobacco: Never Used    Alcohol use 1.2 oz/week     2 Shots of liquor per week    Drug use: Unknown    Sexual activity: Yes     Partners: Female     Review of Systems   Constitutional: Positive for activity change.   Respiratory: Negative for shortness of breath.    Cardiovascular: Negative for chest pain and leg swelling.   Gastrointestinal: Negative for nausea and vomiting.   Genitourinary: Negative.    Musculoskeletal: Positive for arthralgias and  myalgias.   Skin: Positive for color change.   Neurological: Positive for numbness. Negative for weakness.   Psychiatric/Behavioral: Negative.      Objective:     Vital Signs (Most Recent):  Temp: 98.5 °F (36.9 °C) (02/05/18 1632)  Pulse: 80 (02/05/18 1632)  Resp: 18 (02/05/18 1632)  BP: 137/72 (02/05/18 1632)  SpO2: 98 % (02/05/18 1632) Vital Signs (24h Range):  Temp:  [97.7 °F (36.5 °C)-98.5 °F (36.9 °C)] 98.5 °F (36.9 °C)  Pulse:  [74-91] 80  Resp:  [16-19] 18  SpO2:  [96 %-98 %] 98 %  BP: (119-155)/(57-77) 137/72     Weight: 98 kg (216 lb)  Body mass index is 33.83 kg/m².    Foot Exam    General  Affect: appropriate       Right Foot/Ankle     Inspection and Palpation  Tenderness: none   Swelling: none   Skin Exam: skin intact;     Neurovascular  Dorsalis pedis: 1+  Posterior tibial: 1+  Saphenous nerve sensation: diminished  Tibial nerve sensation: diminished  Superficial peroneal nerve sensation: diminished  Deep peroneal nerve sensation: diminished  Sural nerve sensation: diminished      Left Foot/Ankle      Inspection and Palpation  Tenderness: great toe interphalangeal joint   Swelling: great toe metatarsophalangeal joint   Skin Exam: drainage, skin changes and abnormal color;     Neurovascular  Dorsalis pedis: 1+  Posterior tibial: 1+  Saphenous nerve sensation: diminished  Tibial nerve sensation: diminished  Superficial peroneal nerve sensation: diminished  Deep peroneal nerve sensation: diminished  Sural nerve sensation: diminished    Comments  Sanguinous drainage with mild purulence noted surrounding draining from proximal medial aspect of wound bed.     2nd toe nail avulsed with granular nail bed, no SOI noted, stable.   Left heel decubitus ulceration                      Laboratory:  CBC:     Recent Labs  Lab 02/05/18 0414   WBC 4.30   RBC 2.75*   HGB 8.7*   HCT 27.6*      *   MCH 31.6*   MCHC 31.5*     CMP:     Recent Labs  Lab 02/05/18 0414   GLU 86   CALCIUM 8.4*   ALBUMIN 2.2*    PROT 5.2*      K 3.8   CO2 25   *   BUN 12   CREATININE 0.7   ALKPHOS 82   ALT 39   AST 20   BILITOT 0.8       Diagnostic Results:  CHELE:  Impression:   Right Leg: CHELE ( 1.21) and PVR waveforms suggest no evidence of PAOD.    Left Leg: CHELE ( 1.15) and PVR waveforms suggest no evidence of PAOD.    X-Ray:No convincing acute osseous or erosive or destructive process to convincingly suggest infection although there is some erosive change involving the first metatarsophalangeal joint, suspected to be on the basis of degenerative change.  Correlation recommended, 3 phase bone scan to exclude infection as warranted.    Clinical Findings:  S/p hallax nail removal, dried hematoma with mild purulent drainage drainage, erythema.  S/p 2nd toe nail avulsion, no SOI, stable.     Assessment/Plan:     * Osteomyelitis    Tona Dey is a 86 y.o. male s/p left hallux nail removal (1/30).  Painted with betadine covered with mepilex border. Nursing orders placed for daily dressing changes.   -Heel protector boots to be worn  -No acute surgical intervention indicated at this time.  -CHELE's not concerning for PAD.    Weightbearing Status: WBAT left  Offloading Device: DARCO shoe       Brie Lowery DPM PGY-3  Pager: [phone]            Acute deep vein thrombosis (DVT) of popliteal vein of left lower extremity    Per Primary           Lower extremity edema    Per Primary               Brie Lowery MD  Podiatry  [facility]

## 2018-02-05 NOTE — PLAN OF CARE
[facility]    HOME HEALTH ORDERS  FACE TO FACE ENCOUNTER    Patient Name: oTna Dey  YOB: 1931    PCP: Primary Doctor No   PCP Address: None  PCP Phone Number: None  PCP Fax: None    Encounter Date: 02/05/2018    Admit to Home Health    Diagnoses:  Active Hospital Problems    Diagnosis  POA    *Osteomyelitis [M86.9]  Yes    Arthralgia [M25.50]  Yes    Pressure injury of skin, stage 2 [L89.92]  Yes    Mild cognitive impairment [G31.84]  Yes    Long term current use of anticoagulant therapy [Z79.01]  Not Applicable    Parkinsonism [G20]  Yes    Acute deep vein thrombosis (DVT) of popliteal vein of left lower extremity [I82.432]  Yes    Lower extremity edema [R60.0]  Yes    Autonomic instability [G90.9]  Yes     Chronic    Obesity (BMI 30-39.9) [E66.9]  Yes      Resolved Hospital Problems    Diagnosis Date Resolved POA   No resolved problems to display.       Future Appointments  Date Time Provider Department Center   2/6/2018 1:45 PM Jennifer Shaffer DPM [facility]       I have seen and examined this patient face to face today. My clinical findings that support the need for the home health skilled services and home bound status are the following:  Weakness/numbness causing balance and gait disturbance due to Infection, Weakness/Debility and Anemia making it taxing to leave home.  Requiring assistive device to leave home due to unsteady gait caused by  Infection, Weakness/Debility and Anemia.  Medical restrictions requiring assistance of another human to leave home due to  Unstable ambulation, Frequent Falls and Wound care needs.  Mental confusion making it unsafe for patient to leave home alone due to  Parkinson.    Admit to AIM program    Pt is DNR/ DNI    Allergies:Review of patient's allergies indicates:  No Known Allergies    Diet: soft diet    Activities: activity as tolerated    Nursing:   SN to complete comprehensive assessment including routine vital  signs. Instruct on disease process and s/s of complications to report to MD. Review/verify medication list sent home with the patient at time of discharge  and instruct patient/caregiver as needed. Frequency may be adjusted depending on start of care date.    Notify MD if SBP > 160 or < 90; DBP > 90 or < 50; HR > 120 or < 50; Temp > 101       CONSULTS:    Physical Therapy to evaluate and treat. Evaluate for home safety and equipment needs; Establish/upgrade home exercise program. Perform / instruct on therapeutic exercises, gait training, transfer training, and Range of Motion.  Occupational Therapy to evaluate and treat. Evaluate home environment for safety and equipment needs. Perform/Instruct on transfers, ADL training, ROM, and therapeutic exercises.   to evaluate for community resources/long-range planning.  Aide to provide assistance with personal care, ADLs, and vital signs.    MISCELLANEOUS CARE:  Routine Skin for Bedridden Patients: Instruct patient/caregiver to apply moisture barrier cream to all skin folds and wet areas in perineal area daily and after baths and all bowel movements.    WOUND CARE ORDERS  Wound spray or saline for wound cleaning with all dressing changes.    All wounds to be measured with first dressing changes and every week.     Pressure Injury 01/29/18 2000 Sacral spine Stage 2, appears to be healing, epithelization noted.   Recommend dressing with sacral foam dressing and change twice a week.   Low air loss overlay for patient.   Turn every 2hrs.     Left big toe, 2nd toe nail avulsed   - Dressed wounds with betadine, 4x4, kerlix, ACE  - Nursing to follow with dressing changes  - Heel protector boots to be worn     Weightbearing Status: WBAT left  Offloading Device: DARCO shoe           Medications: Review discharge medications with patient and family and provide education.      Current Discharge Medication List      START taking these medications    Details    ciprofloxacin HCl (CIPRO) 500 MG tablet Take 1 tablet (500 mg total) by mouth every 12 (twelve) hours. End date 2/15/18  Qty: 20 tablet, Refills: 0      gabapentin (NEURONTIN) 100 MG capsule Take 2 capsules (200 mg total) by mouth every evening. Titrate up to 3 times a day if needed  Qty: 90 capsule, Refills: 3      lidocaine (LIDODERM) 5 % Place 1 patch onto the skin once daily. Remove & Discard patch within 12 hours or as directed by MD  Qty: 15 patch, Refills: 0      multivitamin (THERAGRAN) tablet Take 1 tablet by mouth once daily.      rivaroxaban (XARELTO) 10 mg Tab Take 1 tablet (10 mg total) by mouth daily with dinner or evening meal.  Qty: 30 tablet, Refills: 4         CONTINUE these medications which have CHANGED    Details   midodrine (PROAMATINE) 10 MG tablet Take 1 tablet (10 mg total) by mouth 3 (three) times daily as needed (if BP >110).         CONTINUE these medications which have NOT CHANGED    Details   acetaminophen (TYLENOL) 325 MG tablet Take 2 tablets (650 mg total) by mouth every 4 (four) hours as needed for Pain.  Qty: 30 tablet, Refills: 0      magnesium hydroxide 400 mg/5 ml (MILK OF MAGNESIA) 400 mg/5 mL Susp Take 30 mLs by mouth daily as needed (constipation).      ranitidine (ZANTAC) 300 MG tablet Take 300 mg by mouth daily as needed for Heartburn.         STOP taking these medications       apixaban (ELIQUIS) 5 mg Tab Comments:   Reason for Stopping:         traMADol (ULTRAM) 50 mg tablet Comments:   Reason for Stopping:         carbidopa-levodopa  mg (SINEMET)  mg per tablet Comments:   Reason for Stopping:         warfarin (COUMADIN) 5 MG tablet Comments:   Reason for Stopping:         warfarin (COUMADIN) 7.5 MG tablet Comments:   Reason for Stopping:               I certify that this patient is confined to his home and needs intermittent skilled nursing care, physical therapy and occupational therapy.    Signing Physician     Davon Barry MD  [facility]  Staff  Department of Hospital Medicine   [facility]  2/5/2018

## 2018-02-06 VITALS
RESPIRATION RATE: 20 BRPM | BODY MASS INDEX: 33.9 KG/M2 | SYSTOLIC BLOOD PRESSURE: 123 MMHG | TEMPERATURE: 98 F | DIASTOLIC BLOOD PRESSURE: 62 MMHG | HEART RATE: 80 BPM | OXYGEN SATURATION: 98 % | WEIGHT: 216 LBS | HEIGHT: 67 IN

## 2018-02-06 LAB
ALBUMIN SERPL BCP-MCNC: 2.4 G/DL
ALP SERPL-CCNC: 91 U/L
ALT SERPL W/O P-5'-P-CCNC: 35 U/L
ANION GAP SERPL CALC-SCNC: 7 MMOL/L
AST SERPL-CCNC: 22 U/L
BASOPHILS # BLD AUTO: 0.03 K/UL
BASOPHILS NFR BLD: 0.6 %
BILIRUB SERPL-MCNC: 0.7 MG/DL
BUN SERPL-MCNC: 18 MG/DL
CALCIUM SERPL-MCNC: 8.6 MG/DL
CHLORIDE SERPL-SCNC: 109 MMOL/L
CO2 SERPL-SCNC: 27 MMOL/L
CREAT SERPL-MCNC: 0.9 MG/DL
DIFFERENTIAL METHOD: ABNORMAL
EOSINOPHIL # BLD AUTO: 0.2 K/UL
EOSINOPHIL NFR BLD: 4.8 %
ERYTHROCYTE [DISTWIDTH] IN BLOOD BY AUTOMATED COUNT: 13.8 %
EST. GFR  (AFRICAN AMERICAN): >60 ML/MIN/1.73 M^2
EST. GFR  (NON AFRICAN AMERICAN): >60 ML/MIN/1.73 M^2
GLUCOSE SERPL-MCNC: 88 MG/DL
HCT VFR BLD AUTO: 27.4 %
HGB BLD-MCNC: 8.5 G/DL
IMM GRANULOCYTES # BLD AUTO: 0.02 K/UL
IMM GRANULOCYTES NFR BLD AUTO: 0.4 %
LYMPHOCYTES # BLD AUTO: 2.2 K/UL
LYMPHOCYTES NFR BLD: 45.4 %
MAGNESIUM SERPL-MCNC: 2 MG/DL
MCH RBC QN AUTO: 30.9 PG
MCHC RBC AUTO-ENTMCNC: 31 G/DL
MCV RBC AUTO: 100 FL
MONOCYTES # BLD AUTO: 0.3 K/UL
MONOCYTES NFR BLD: 6.1 %
NEUTROPHILS # BLD AUTO: 2 K/UL
NEUTROPHILS NFR BLD: 42.7 %
NRBC BLD-RTO: 0 /100 WBC
PHOSPHATE SERPL-MCNC: 3.4 MG/DL
PLATELET # BLD AUTO: 265 K/UL
PMV BLD AUTO: 9.9 FL
POTASSIUM SERPL-SCNC: 4.1 MMOL/L
PROT SERPL-MCNC: 5.6 G/DL
RBC # BLD AUTO: 2.75 M/UL
SODIUM SERPL-SCNC: 143 MMOL/L
WBC # BLD AUTO: 4.76 K/UL

## 2018-02-06 PROCEDURE — 85025 COMPLETE CBC W/AUTO DIFF WBC: CPT

## 2018-02-06 PROCEDURE — 36415 COLL VENOUS BLD VENIPUNCTURE: CPT

## 2018-02-06 PROCEDURE — 25000003 PHARM REV CODE 250: Performed by: HOSPITALIST

## 2018-02-06 PROCEDURE — 83735 ASSAY OF MAGNESIUM: CPT

## 2018-02-06 PROCEDURE — 80053 COMPREHEN METABOLIC PANEL: CPT

## 2018-02-06 PROCEDURE — 84100 ASSAY OF PHOSPHORUS: CPT

## 2018-02-06 RX ADMIN — THERA TABS 1 TABLET: TAB at 08:02

## 2018-02-06 RX ADMIN — BACITRACIN ZINC NEOMYCIN SULFATE POLYMYXIN B SULFATE: 400; 3.5; 5 OINTMENT TOPICAL at 08:02

## 2018-02-06 RX ADMIN — CIPROFLOXACIN HYDROCHLORIDE 500 MG: 250 TABLET, FILM COATED ORAL at 08:02

## 2018-02-06 NOTE — PHYSICIAN QUERY
PT Name: Tona Dey  MR #: [id number]     Physician Query Form - Documentation Clarification      CDS/: [facility] [place]               Contact information:   [email or web address]    This form is a permanent document in the medical record.     Query Date: February 6, 2018    By submitting this query, we are merely seeking further clarification of documentation. Please utilize your independent clinical judgment when addressing the question(s) below.    The Medical record reflects the following:    Supporting Clinical Findings Location in Medical Record     Left heel decubitus ulceration       Progress 1/31-Podiatry- picture provided   - Heel protector boots to be worn     Progress 1/31- Hosp Med                                                                            Doctor, Please clarify diagnosis:     L heel pressure ulcer       or diagnoses associated with above clinical findings.    Provider Use Only    Please provide the following :     What stage:  _____stage 2______________    Present on admit (POA)  status:  [ xxx ] yes, POA       [  ]  No, Not POA    [  ]  Clinically undetermined

## 2018-02-06 NOTE — PLAN OF CARE
9:03 AM  SW called pt's spouse and informed her that [facility] transportation is in route now to hospital.     Informed RN.     Susan Rosado LMSW   [facility]  Ext [phone]

## 2018-02-06 NOTE — NURSING
Call placed to [facility] service to inquire about the status of the transfer. Spoke with Shaggy RAMACHANDRAN Advised that the pt was never picked up, he stated they were waiting on an authorization from [facility] and that the auth was received, but somehow the pt was was not on the roll to be picked up. Spoke with patient's spouse and she stated the pt has to be transferred between 9 and 10 am tomorrow (Tuesday 2/6) because she does not have anyone to help her with him until that time. Shaggy with [facility] stated he would call Mrs. Dey in the morning and make sure the pt is brought home during the requested time frame.     0524. Uneventful night. Vital signs stable. Informed pt that [facility] would pick him up to go home today. Bed in lowest position, call bell in reach.

## 2018-02-12 ENCOUNTER — TELEPHONE (OUTPATIENT)
Dept: PODIATRY | Facility: CLINIC | Age: 83
End: 2018-02-12

## 2018-02-12 ENCOUNTER — TELEPHONE (OUTPATIENT)
Dept: PODIATRY | Facility: HOSPITAL | Age: 83
End: 2018-02-12

## 2018-02-12 NOTE — TELEPHONE ENCOUNTER
----- Message from Brie Lowery MD sent at 2/6/2018 11:43 AM CST -----  Hi could you make a hospital f/u appt within one week with Brie Dowling.         I call and tried to schedule  appointment with the patient but the patient said to call his wife and schedule the appointment . I call the wife at [phone] and left a message to call back and schedule a posted - op for her   .    Dr. Shaffer patient wife call back and was upset about her  procedure on his toe nails. When you this this message patient wife would like for you to call her at [phone].

## 2018-02-12 NOTE — TELEPHONE ENCOUNTER
Contacted patient's wife who was concerned about missing toenail to left 2nd toe. Explained to patient's wife that during last hospital stay from 1/29/18- 2/5/18. 2nd toenail found to be avulsed after patient bumped it during physical therapy session. Explained to patient's wife this was reported to podiatry and we did not witness this event. Podiatry only removed left great toenail 2/2 to it being 60% detached and concern for underlying infection. Also tried to make f/u appt for patient however wife states transport needs to be [facility] and she was unsure if their PCP was able to do this. Wife plans on calling PCP and finding out procedure then will contact podiatry to make a f/u appt. In the interim instructed patient to apply triple antibiotic ointment to both left great toe and 2nd toenail bed cover with band aid daily    Brie Lowery DPM PGY-3

## 2018-12-14 NOTE — PROGRESS NOTES
Impression: Diagnosis: Open angle with borderline findings, high risk, bilateral. Code: H40.023. vs Early OAG  IOP OU improved w/ Timolol. Plan: Continue to monitor. Continue with Latanoprost QHS OU, Timolol BID OU. Pt IV infiltrated  IV removed unable to obtain new site. Attempts x3 .

## 2020-07-27 NOTE — ASSESSMENT & PLAN NOTE
- BNP 70, no echo on file.  CXR unremarkable.  - Patient received Lasix 40mg IV in ER  - Patient would likely benefit from compression stockings on discharge to use long term, considering h/o autonomic instability and BLE edema.  - Strict I&Os, daily weights, cardiac diet.  Elevate BLE.  - 2d shows pEF without wall motion abnormalities  - BLE edema improved with IV lasix and now discontinued   Called patient after receiving +blood culture results. Patient has gram + cocci in clusters. Notified Dr. Vyas's team and recommendation is for patient to go into the ER for assessment and treatment. Understanding stated.

## 2023-05-23 NOTE — ASSESSMENT & PLAN NOTE
- Patient reports decreased ambulation while at SNF.  - PT/OT following and SW assisting with discharge plan  - Fall precautions, ambulate with assistance, incentive spirometry.  10/3: Wife agreeable to SNF. SW to place referrals.   10/4: Denied [facility]  10/5: SW placed more referrals for SNF  10/10: Discharged to [facility]   Soolantra Pregnancy And Lactation Text: This medication is Pregnancy Category C. This medication is considered safe during breast feeding.

## 2023-07-10 NOTE — SUBJECTIVE & OBJECTIVE
Interval History: No acute events overnight.  Continued on fludrocortisone and midodrine.  Blood pressure remains labile.  However, patient does not report any light headedness or syncopal episode.  Miscommunication with wife this afternoon who felt that she was now not able to care for him when he arrives home since the home health nursing was not available this evening.  Will plan for discharge early tomorrow morning.      Review of Systems   Constitutional: Negative for activity change, appetite change and chills.   HENT: Negative for congestion and facial swelling.    Eyes: Negative for discharge and itching.   Respiratory: Negative for apnea, chest tightness and shortness of breath.    Cardiovascular: Negative for chest pain, palpitations and leg swelling.   Gastrointestinal: Negative for abdominal distention, anal bleeding, blood in stool and constipation.   Endocrine: Negative for cold intolerance and heat intolerance.   Genitourinary: Negative for difficulty urinating and dysuria.   Musculoskeletal: Positive for arthralgias and back pain.   Skin: Negative for color change.   Allergic/Immunologic: Negative for environmental allergies and food allergies.   Neurological: Negative for dizziness, facial asymmetry and headaches.   Hematological: Negative for adenopathy. Does not bruise/bleed easily.   Psychiatric/Behavioral: Negative for agitation and behavioral problems.     Objective:     Vital Signs (Most Recent):  Temp: 98.1 °F (36.7 °C) (11/06/17 1145)  Pulse: 82 (11/06/17 1145)  Resp: 16 (11/06/17 1145)  BP: (!) 162/78 (11/06/17 1145)  SpO2: 98 % (11/06/17 1145) Vital Signs (24h Range):  Temp:  [97.7 °F (36.5 °C)-98.3 °F (36.8 °C)] 98.1 °F (36.7 °C)  Pulse:  [76-89] 82  Resp:  [16-20] 16  SpO2:  [95 %-98 %] 98 %  BP: (108-168)/(66-79) 162/78     Weight: 98.9 kg (218 lb)  Body mass index is 33.15 kg/m².    Intake/Output Summary (Last 24 hours) at 11/06/17 1444  Last data filed at 11/06/17 0900   Gross per 24  hour   Intake              350 ml   Output                0 ml   Net              350 ml      Physical Exam   Constitutional: He is oriented to person, place, and time. He appears well-developed and well-nourished.   HENT:   Head: Normocephalic and atraumatic.   Eyes: Conjunctivae and EOM are normal.   Neck: No JVD present. No tracheal deviation present.   Cardiovascular: Normal rate, regular rhythm and normal heart sounds.    Pulmonary/Chest: Effort normal and breath sounds normal.   Abdominal: Soft. Bowel sounds are normal.   Musculoskeletal: He exhibits edema (1+ BLE). He exhibits no deformity.   Neurological: He is alert and oriented to person, place, and time.   Skin: Skin is warm and dry.   Psychiatric: He has a normal mood and affect. His behavior is normal.   Vitals reviewed.      Significant Labs: All pertinent labs within the past 24 hours have been reviewed.    Significant Imaging: no new imaging   Medical Necessity Information: It is in your best interest to select a reason for this procedure from the list below. All of these items fulfill various CMS LCD requirements except lesion extends to a margin. Include Z78.9 (Other Specified Conditions Influencing Health Status) As An Associated Diagnosis?: Yes Medical Necessity Clause: This procedure was medically necessary because the lesion that was treated was: Lab: -53 Lab Facility: 3 Surgeon (Optional): Dr Kerr Surgeon Performing Repair (Optional): Dr Kerr Biopsy Photograph Reviewed: No (no photograph available) Size Of Lesion In Cm: 1 X Size Of Lesion In Cm (Optional): 0 Anesthesia Volume In Cc: 6 Excision Method: Elliptical Saucerization Depth: dermis and superficial adipose tissue Did You Provide Opioid Counseling: No Repair Type: Intermediate Intermediate / Complex Repair - Final Wound Length In Cm: 2 Suturegard Retention Suture: 2-0 Nylon Retention Suture Bite Size: 3 mm Length To Time In Minutes Device Was In Place: 10 Undermining Type: Entire Wound Debridement Text: The wound edges were debrided prior to proceeding with the closure to facilitate wound healing. Helical Rim Text: The closure involved the helical rim. Vermilion Border Text: The closure involved the vermilion border. Nostril Rim Text: The closure involved the nostril rim. Retention Suture Text: Retention sutures were placed to support the closure and prevent dehiscence. Suture Removal: 14 days Epidermal Closure Graft Donor Site (Optional): simple interrupted Detail Level: Detailed Excision Depth: adipose tissue Scalpel Size: 15 blade Anesthesia Type: 1% lidocaine with epinephrine and a 1:10 solution of 8.4% sodium bicarbonate Hemostasis: Electrodesiccation Estimated Blood Loss (Cc): minimal Deep Sutures: 4-0 Monocryl Dermal Closure: buried vertical mattress Epidermal Sutures: 5-0 Prolene Wound Care: Petrolatum Dressing: pressure dressing with telfa Suturegard Intro: Intraoperative tissue expansion was performed, utilizing the SUTUREGARD device, in order to reduce wound tension. Suturegard Body: The suture ends were repeatedly re-tightened and re-clamped to achieve the desired tissue expansion. Hemigard Intro: Due to skin fragility and wound tension, it was decided to use HEMIGARD adhesive retention suture devices to permit a linear closure. The skin was cleaned and dried for a 6cm distance away from the wound. Excessive hair, if present, was removed to allow for adhesion. Hemigard Postcare Instructions: The HEMIGARD strips are to remain completely dry for at least 5-7 days. Complex Repair Preamble Text (Leave Blank If You Do Not Want): Extensive wide undermining was performed. Intermediate Repair Preamble Text (Leave Blank If You Do Not Want): Undermining was performed with blunt dissection. Fusiform Excision Additional Text (Leave Blank If You Do Not Want): The margin was drawn around the clinically apparent lesion.  A fusiform shape was then drawn on the skin incorporating the lesion and margins.  Incisions were then made along these lines to the appropriate tissue plane and the lesion was extirpated. Eliptical Excision Additional Text (Leave Blank If You Do Not Want): The margin was drawn around the clinically apparent lesion.  An elliptical shape was then drawn on the skin incorporating the lesion and margins.  Incisions were then made along these lines to the appropriate tissue plane and the lesion was extirpated. Saucerization Excision Additional Text (Leave Blank If You Do Not Want): The margin was drawn around the clinically apparent lesion.  Incisions were then made along these lines, in a tangential fashion, to the appropriate tissue plane and the lesion was extirpated. Slit Excision Additional Text (Leave Blank If You Do Not Want): A linear line was drawn on the skin overlying the lesion. An incision was made slowly until the lesion was visualized.  Once visualized, the lesion was removed with blunt dissection. Excisional Biopsy Additional Text (Leave Blank If You Do Not Want): The margin was drawn around the clinically apparent lesion. An elliptical shape was then drawn on the skin incorporating the lesion and margins.  Incisions were then made along these lines to the appropriate tissue plane and the lesion was extirpated. Perilesional Excision Additional Text (Leave Blank If You Do Not Want): The margin was drawn around the clinically apparent lesion. Incisions were then made along these lines to the appropriate tissue plane and the lesion was extirpated. Repair Performed By Another Provider Text (Leave Blank If You Do Not Want): After the tissue was excised the defect was repaired by another provider. No Repair - Repaired With Adjacent Surgical Defect Text (Leave Blank If You Do Not Want): After the excision the defect was repaired concurrently with another surgical defect which was in close approximation. Adjacent Tissue Transfer Text: The defect edges were debeveled with a #15 scalpel blade.  Given the location of the defect and the proximity to free margins an adjacent tissue transfer was deemed most appropriate.  Using a sterile surgical marker, an appropriate flap was drawn incorporating the defect and placing the expected incisions within the relaxed skin tension lines where possible.    The area thus outlined was incised deep to adipose tissue with a #15 scalpel blade.  The skin margins were undermined to an appropriate distance in all directions utilizing iris scissors. Advancement Flap (Single) Text: The defect edges were debeveled with a #15 scalpel blade.  Given the location of the defect and the proximity to free margins a single advancement flap was deemed most appropriate.  Using a sterile surgical marker, an appropriate advancement flap was drawn incorporating the defect and placing the expected incisions within the relaxed skin tension lines where possible.    The area thus outlined was incised deep to adipose tissue with a #15 scalpel blade.  The skin margins were undermined to an appropriate distance in all directions utilizing iris scissors. Advancement Flap (Double) Text: The defect edges were debeveled with a #15 scalpel blade.  Given the location of the defect and the proximity to free margins a double advancement flap was deemed most appropriate.  Using a sterile surgical marker, the appropriate advancement flaps were drawn incorporating the defect and placing the expected incisions within the relaxed skin tension lines where possible.    The area thus outlined was incised deep to adipose tissue with a #15 scalpel blade.  The skin margins were undermined to an appropriate distance in all directions utilizing iris scissors. Burow's Advancement Flap Text: The defect edges were debeveled with a #15 scalpel blade.  Given the location of the defect and the proximity to free margins a Burow's advancement flap was deemed most appropriate.  Using a sterile surgical marker, the appropriate advancement flap was drawn incorporating the defect and placing the expected incisions within the relaxed skin tension lines where possible.    The area thus outlined was incised deep to adipose tissue with a #15 scalpel blade.  The skin margins were undermined to an appropriate distance in all directions utilizing iris scissors. Chonodrocutaneous Helical Advancement Flap Text: The defect edges were debeveled with a #15 scalpel blade.  Given the location of the defect and the proximity to free margins a chondrocutaneous helical advancement flap was deemed most appropriate.  Using a sterile surgical marker, the appropriate advancement flap was drawn incorporating the defect and placing the expected incisions within the relaxed skin tension lines where possible.    The area thus outlined was incised deep to adipose tissue with a #15 scalpel blade.  The skin margins were undermined to an appropriate distance in all directions utilizing iris scissors. Crescentic Advancement Flap Text: The defect edges were debeveled with a #15 scalpel blade.  Given the location of the defect and the proximity to free margins a crescentic advancement flap was deemed most appropriate.  Using a sterile surgical marker, the appropriate advancement flap was drawn incorporating the defect and placing the expected incisions within the relaxed skin tension lines where possible.    The area thus outlined was incised deep to adipose tissue with a #15 scalpel blade.  The skin margins were undermined to an appropriate distance in all directions utilizing iris scissors. A-T Advancement Flap Text: The defect edges were debeveled with a #15 scalpel blade.  Given the location of the defect, shape of the defect and the proximity to free margins an A-T advancement flap was deemed most appropriate.  Using a sterile surgical marker, an appropriate advancement flap was drawn incorporating the defect and placing the expected incisions within the relaxed skin tension lines where possible.    The area thus outlined was incised deep to adipose tissue with a #15 scalpel blade.  The skin margins were undermined to an appropriate distance in all directions utilizing iris scissors. O-T Advancement Flap Text: The defect edges were debeveled with a #15 scalpel blade.  Given the location of the defect, shape of the defect and the proximity to free margins an O-T advancement flap was deemed most appropriate.  Using a sterile surgical marker, an appropriate advancement flap was drawn incorporating the defect and placing the expected incisions within the relaxed skin tension lines where possible.    The area thus outlined was incised deep to adipose tissue with a #15 scalpel blade.  The skin margins were undermined to an appropriate distance in all directions utilizing iris scissors. O-L Flap Text: The defect edges were debeveled with a #15 scalpel blade.  Given the location of the defect, shape of the defect and the proximity to free margins an O-L flap was deemed most appropriate.  Using a sterile surgical marker, an appropriate advancement flap was drawn incorporating the defect and placing the expected incisions within the relaxed skin tension lines where possible.    The area thus outlined was incised deep to adipose tissue with a #15 scalpel blade.  The skin margins were undermined to an appropriate distance in all directions utilizing iris scissors. O-Z Flap Text: The defect edges were debeveled with a #15 scalpel blade.  Given the location of the defect, shape of the defect and the proximity to free margins an O-Z flap was deemed most appropriate.  Using a sterile surgical marker, an appropriate transposition flap was drawn incorporating the defect and placing the expected incisions within the relaxed skin tension lines where possible. The area thus outlined was incised deep to adipose tissue with a #15 scalpel blade.  The skin margins were undermined to an appropriate distance in all directions utilizing iris scissors. Double O-Z Flap Text: The defect edges were debeveled with a #15 scalpel blade.  Given the location of the defect, shape of the defect and the proximity to free margins a Double O-Z flap was deemed most appropriate.  Using a sterile surgical marker, an appropriate transposition flap was drawn incorporating the defect and placing the expected incisions within the relaxed skin tension lines where possible. The area thus outlined was incised deep to adipose tissue with a #15 scalpel blade.  The skin margins were undermined to an appropriate distance in all directions utilizing iris scissors. V-Y Flap Text: The defect edges were debeveled with a #15 scalpel blade.  Given the location of the defect, shape of the defect and the proximity to free margins a V-Y flap was deemed most appropriate.  Using a sterile surgical marker, an appropriate advancement flap was drawn incorporating the defect and placing the expected incisions within the relaxed skin tension lines where possible.    The area thus outlined was incised deep to adipose tissue with a #15 scalpel blade.  The skin margins were undermined to an appropriate distance in all directions utilizing iris scissors. Mercedes Flap Text: The defect edges were debeveled with a #15 scalpel blade.  Given the location of the defect, shape of the defect and the proximity to free margins a Mercedes flap was deemed most appropriate.  Using a sterile surgical marker, an appropriate advancement flap was drawn incorporating the defect and placing the expected incisions within the relaxed skin tension lines where possible. The area thus outlined was incised deep to adipose tissue with a #15 scalpel blade.  The skin margins were undermined to an appropriate distance in all directions utilizing iris scissors. Modified Advancement Flap Text: The defect edges were debeveled with a #15 scalpel blade.  Given the location of the defect, shape of the defect and the proximity to free margins a modified advancement flap was deemed most appropriate.  Using a sterile surgical marker, an appropriate advancement flap was drawn incorporating the defect and placing the expected incisions within the relaxed skin tension lines where possible.    The area thus outlined was incised deep to adipose tissue with a #15 scalpel blade.  The skin margins were undermined to an appropriate distance in all directions utilizing iris scissors. Mucosal Advancement Flap Text: Given the location of the defect, shape of the defect and the proximity to free margins a mucosal advancement flap was deemed most appropriate. Incisions were made with a 15 blade scalpel in the appropriate fashion along the cutaneous vermillion border and the mucosal lip. The remaining actinically damaged mucosal tissue was excised.  The mucosal advancement flap was then elevated to the gingival sulcus with care taken to preserve the neurovascular structures and advanced into the primary defect. Care was taken to ensure that precise realignment of the vermilion border was achieved. Hatchet Flap Text: The defect edges were debeveled with a #15 scalpel blade.  Given the location of the defect, shape of the defect and the proximity to free margins a hatchet flap was deemed most appropriate.  Using a sterile surgical marker, an appropriate hatchet flap was drawn incorporating the defect and placing the expected incisions within the relaxed skin tension lines where possible.    The area thus outlined was incised deep to adipose tissue with a #15 scalpel blade.  The skin margins were undermined to an appropriate distance in all directions utilizing iris scissors. Rotation Flap Text: The defect edges were debeveled with a #15 scalpel blade.  Given the location of the defect, shape of the defect and the proximity to free margins a rotation flap was deemed most appropriate.  Using a sterile surgical marker, an appropriate rotation flap was drawn incorporating the defect and placing the expected incisions within the relaxed skin tension lines where possible.    The area thus outlined was incised deep to adipose tissue with a #15 scalpel blade.  The skin margins were undermined to an appropriate distance in all directions utilizing iris scissors. Bilateral Rotation Flap Text: The defect edges were debeveled with a #15 scalpel blade. Given the location of the defect, shape of the defect and the proximity to free margins a bilateral rotation flap was deemed most appropriate. Using a sterile surgical marker, an appropriate rotation flap was drawn incorporating the defect and placing the expected incisions within the relaxed skin tension lines where possible. The area thus outlined was incised deep to adipose tissue with a #15 scalpel blade. The skin margins were undermined to an appropriate distance in all directions utilizing iris scissors. Following this, the designed flap was carried over into the primary defect and sutured into place. Spiral Flap Text: The defect edges were debeveled with a #15 scalpel blade.  Given the location of the defect, shape of the defect and the proximity to free margins a spiral flap was deemed most appropriate.  Using a sterile surgical marker, an appropriate rotation flap was drawn incorporating the defect and placing the expected incisions within the relaxed skin tension lines where possible. The area thus outlined was incised deep to adipose tissue with a #15 scalpel blade.  The skin margins were undermined to an appropriate distance in all directions utilizing iris scissors. Staged Advancement Flap Text: The defect edges were debeveled with a #15 scalpel blade.  Given the location of the defect, shape of the defect and the proximity to free margins a staged advancement flap was deemed most appropriate.  Using a sterile surgical marker, an appropriate advancement flap was drawn incorporating the defect and placing the expected incisions within the relaxed skin tension lines where possible. The area thus outlined was incised deep to adipose tissue with a #15 scalpel blade.  The skin margins were undermined to an appropriate distance in all directions utilizing iris scissors. Star Wedge Flap Text: The defect edges were debeveled with a #15 scalpel blade.  Given the location of the defect, shape of the defect and the proximity to free margins a star wedge flap was deemed most appropriate.  Using a sterile surgical marker, an appropriate rotation flap was drawn incorporating the defect and placing the expected incisions within the relaxed skin tension lines where possible. The area thus outlined was incised deep to adipose tissue with a #15 scalpel blade.  The skin margins were undermined to an appropriate distance in all directions utilizing iris scissors. Transposition Flap Text: The defect edges were debeveled with a #15 scalpel blade.  Given the location of the defect and the proximity to free margins a transposition flap was deemed most appropriate.  Using a sterile surgical marker, an appropriate transposition flap was drawn incorporating the defect.    The area thus outlined was incised deep to adipose tissue with a #15 scalpel blade.  The skin margins were undermined to an appropriate distance in all directions utilizing iris scissors. Muscle Hinge Flap Text: The defect edges were debeveled with a #15 scalpel blade.  Given the size, depth and location of the defect and the proximity to free margins a muscle hinge flap was deemed most appropriate.  Using a sterile surgical marker, an appropriate hinge flap was drawn incorporating the defect. The area thus outlined was incised with a #15 scalpel blade.  The skin margins were undermined to an appropriate distance in all directions utilizing iris scissors. Mustarde Flap Text: The defect edges were debeveled with a #15 scalpel blade.  Given the size, depth and location of the defect and the proximity to free margins a Mustarde flap was deemed most appropriate.  Using a sterile surgical marker, an appropriate flap was drawn incorporating the defect. The area thus outlined was incised with a #15 scalpel blade.  The skin margins were undermined to an appropriate distance in all directions utilizing iris scissors. Nasal Turnover Hinge Flap Text: The defect edges were debeveled with a #15 scalpel blade.  Given the size, depth, location of the defect and the defect being full thickness a nasal turnover hinge flap was deemed most appropriate.  Using a sterile surgical marker, an appropriate hinge flap was drawn incorporating the defect. The area thus outlined was incised with a #15 scalpel blade. The flap was designed to recreate the nasal mucosal lining and the alar rim. The skin margins were undermined to an appropriate distance in all directions utilizing iris scissors. Nasalis-Muscle-Based Myocutaneous Island Pedicle Flap Text: Using a #15 blade, an incision was made around the donor flap to the level of the nasalis muscle. Wide lateral undermining was then performed in both the subcutaneous plane above the nasalis muscle, and in a submuscular plane just above periosteum. This allowed the formation of a free nasalis muscle axial pedicle (based on the angular artery) which was still attached to the actual cutaneous flap, increasing its mobility and vascular viability. Hemostasis was obtained with pinpoint electrocoagulation. The flap was mobilized into position and the pivotal anchor points positioned and stabilized with buried interrupted sutures. Subcutaneous and dermal tissues were closed in a multilayered fashion with sutures. Tissue redundancies were excised, and the epidermal edges were apposed without significant tension and sutured with sutures. Orbicularis Oris Muscle Flap Text: The defect edges were debeveled with a #15 scalpel blade.  Given that the defect affected the competency of the oral sphincter an orbicularis oris muscle flap was deemed most appropriate to restore this competency and normal muscle function.  Using a sterile surgical marker, an appropriate flap was drawn incorporating the defect. The area thus outlined was incised with a #15 scalpel blade. Melolabial Transposition Flap Text: The defect edges were debeveled with a #15 scalpel blade.  Given the location of the defect and the proximity to free margins a melolabial flap was deemed most appropriate.  Using a sterile surgical marker, an appropriate melolabial transposition flap was drawn incorporating the defect.    The area thus outlined was incised deep to adipose tissue with a #15 scalpel blade.  The skin margins were undermined to an appropriate distance in all directions utilizing iris scissors. Rhombic Flap Text: The defect edges were debeveled with a #15 scalpel blade.  Given the location of the defect and the proximity to free margins a rhombic flap was deemed most appropriate.  Using a sterile surgical marker, an appropriate rhombic flap was drawn incorporating the defect.    The area thus outlined was incised deep to adipose tissue with a #15 scalpel blade.  The skin margins were undermined to an appropriate distance in all directions utilizing iris scissors. Rhomboid Transposition Flap Text: The defect edges were debeveled with a #15 scalpel blade.  Given the location of the defect and the proximity to free margins a rhomboid transposition flap was deemed most appropriate.  Using a sterile surgical marker, an appropriate rhomboid flap was drawn incorporating the defect.    The area thus outlined was incised deep to adipose tissue with a #15 scalpel blade.  The skin margins were undermined to an appropriate distance in all directions utilizing iris scissors. Bi-Rhombic Flap Text: The defect edges were debeveled with a #15 scalpel blade.  Given the location of the defect and the proximity to free margins a bi-rhombic flap was deemed most appropriate.  Using a sterile surgical marker, an appropriate rhombic flap was drawn incorporating the defect. The area thus outlined was incised deep to adipose tissue with a #15 scalpel blade.  The skin margins were undermined to an appropriate distance in all directions utilizing iris scissors. Helical Rim Advancement Flap Text: The defect edges were debeveled with a #15 blade scalpel.  Given the location of the defect and the proximity to free margins (helical rim) a double helical rim advancement flap was deemed most appropriate.  Using a sterile surgical marker, the appropriate advancement flaps were drawn incorporating the defect and placing the expected incisions between the helical rim and antihelix where possible.  The area thus outlined was incised through and through with a #15 scalpel blade.  With a skin hook and iris scissors, the flaps were gently and sharply undermined and freed up. Bilateral Helical Rim Advancement Flap Text: The defect edges were debeveled with a #15 blade scalpel.  Given the location of the defect and the proximity to free margins (helical rim) a bilateral helical rim advancement flap was deemed most appropriate.  Using a sterile surgical marker, the appropriate advancement flaps were drawn incorporating the defect and placing the expected incisions between the helical rim and antihelix where possible.  The area thus outlined was incised through and through with a #15 scalpel blade.  With a skin hook and iris scissors, the flaps were gently and sharply undermined and freed up. Ear Star Wedge Flap Text: The defect edges were debeveled with a #15 blade scalpel.  Given the location of the defect and the proximity to free margins (helical rim) an ear star wedge flap was deemed most appropriate.  Using a sterile surgical marker, the appropriate flap was drawn incorporating the defect and placing the expected incisions between the helical rim and antihelix where possible.  The area thus outlined was incised through and through with a #15 scalpel blade. Banner Transposition Flap Text: The defect edges were debeveled with a #15 scalpel blade.  Given the location of the defect and the proximity to free margins a Banner transposition flap was deemed most appropriate.  Using a sterile surgical marker, an appropriate flap drawn around the defect. The area thus outlined was incised deep to adipose tissue with a #15 scalpel blade.  The skin margins were undermined to an appropriate distance in all directions utilizing iris scissors. Bilobed Flap Text: The defect edges were debeveled with a #15 scalpel blade.  Given the location of the defect and the proximity to free margins a bilobe flap was deemed most appropriate.  Using a sterile surgical marker, an appropriate bilobe flap drawn around the defect.    The area thus outlined was incised deep to adipose tissue with a #15 scalpel blade.  The skin margins were undermined to an appropriate distance in all directions utilizing iris scissors. Bilobed Transposition Flap Text: The defect edges were debeveled with a #15 scalpel blade.  Given the location of the defect and the proximity to free margins a bilobed transposition flap was deemed most appropriate.  Using a sterile surgical marker, an appropriate bilobe flap drawn around the defect.    The area thus outlined was incised deep to adipose tissue with a #15 scalpel blade.  The skin margins were undermined to an appropriate distance in all directions utilizing iris scissors. Trilobed Flap Text: The defect edges were debeveled with a #15 scalpel blade.  Given the location of the defect and the proximity to free margins a trilobed flap was deemed most appropriate.  Using a sterile surgical marker, an appropriate trilobed flap drawn around the defect.    The area thus outlined was incised deep to adipose tissue with a #15 scalpel blade.  The skin margins were undermined to an appropriate distance in all directions utilizing iris scissors. Dorsal Nasal Flap Text: The defect edges were debeveled with a #15 scalpel blade.  Given the location of the defect and the proximity to free margins a dorsal nasal flap was deemed most appropriate.  Using a sterile surgical marker, an appropriate dorsal nasal flap was drawn around the defect.    The area thus outlined was incised deep to adipose tissue with a #15 scalpel blade.  The skin margins were undermined to an appropriate distance in all directions utilizing iris scissors. Island Pedicle Flap Text: The defect edges were debeveled with a #15 scalpel blade.  Given the location of the defect, shape of the defect and the proximity to free margins an island pedicle advancement flap was deemed most appropriate.  Using a sterile surgical marker, an appropriate advancement flap was drawn incorporating the defect, outlining the appropriate donor tissue and placing the expected incisions within the relaxed skin tension lines where possible.    The area thus outlined was incised deep to adipose tissue with a #15 scalpel blade.  The skin margins were undermined to an appropriate distance in all directions around the primary defect and laterally outward around the island pedicle utilizing iris scissors.  There was minimal undermining beneath the pedicle flap. Island Pedicle Flap With Canthal Suspension Text: The defect edges were debeveled with a #15 scalpel blade.  Given the location of the defect, shape of the defect and the proximity to free margins an island pedicle advancement flap was deemed most appropriate.  Using a sterile surgical marker, an appropriate advancement flap was drawn incorporating the defect, outlining the appropriate donor tissue and placing the expected incisions within the relaxed skin tension lines where possible. The area thus outlined was incised deep to adipose tissue with a #15 scalpel blade.  The skin margins were undermined to an appropriate distance in all directions around the primary defect and laterally outward around the island pedicle utilizing iris scissors.  There was minimal undermining beneath the pedicle flap. A suspension suture was placed in the canthal tendon to prevent tension and prevent ectropion. Alar Island Pedicle Flap Text: The defect edges were debeveled with a #15 scalpel blade.  Given the location of the defect, shape of the defect and the proximity to the alar rim an island pedicle advancement flap was deemed most appropriate.  Using a sterile surgical marker, an appropriate advancement flap was drawn incorporating the defect, outlining the appropriate donor tissue and placing the expected incisions within the nasal ala running parallel to the alar rim. The area thus outlined was incised with a #15 scalpel blade.  The skin margins were undermined minimally to an appropriate distance in all directions around the primary defect and laterally outward around the island pedicle utilizing iris scissors.  There was minimal undermining beneath the pedicle flap. Double Island Pedicle Flap Text: The defect edges were debeveled with a #15 scalpel blade.  Given the location of the defect, shape of the defect and the proximity to free margins a double island pedicle advancement flap was deemed most appropriate.  Using a sterile surgical marker, an appropriate advancement flap was drawn incorporating the defect, outlining the appropriate donor tissue and placing the expected incisions within the relaxed skin tension lines where possible.    The area thus outlined was incised deep to adipose tissue with a #15 scalpel blade.  The skin margins were undermined to an appropriate distance in all directions around the primary defect and laterally outward around the island pedicle utilizing iris scissors.  There was minimal undermining beneath the pedicle flap. Island Pedicle Flap-Requiring Vessel Identification Text: The defect edges were debeveled with a #15 scalpel blade.  Given the location of the defect, shape of the defect and the proximity to free margins an island pedicle advancement flap was deemed most appropriate.  Using a sterile surgical marker, an appropriate advancement flap was drawn, based on the axial vessel mentioned above, incorporating the defect, outlining the appropriate donor tissue and placing the expected incisions within the relaxed skin tension lines where possible.    The area thus outlined was incised deep to adipose tissue with a #15 scalpel blade.  The skin margins were undermined to an appropriate distance in all directions around the primary defect and laterally outward around the island pedicle utilizing iris scissors.  There was minimal undermining beneath the pedicle flap. Keystone Flap Text: The defect edges were debeveled with a #15 scalpel blade.  Given the location of the defect, shape of the defect a keystone flap was deemed most appropriate.  Using a sterile surgical marker, an appropriate keystone flap was drawn incorporating the defect, outlining the appropriate donor tissue and placing the expected incisions within the relaxed skin tension lines where possible. The area thus outlined was incised deep to adipose tissue with a #15 scalpel blade.  The skin margins were undermined to an appropriate distance in all directions around the primary defect and laterally outward around the flap utilizing iris scissors. O-T Plasty Text: The defect edges were debeveled with a #15 scalpel blade.  Given the location of the defect, shape of the defect and the proximity to free margins an O-T plasty was deemed most appropriate.  Using a sterile surgical marker, an appropriate O-T plasty was drawn incorporating the defect and placing the expected incisions within the relaxed skin tension lines where possible.    The area thus outlined was incised deep to adipose tissue with a #15 scalpel blade.  The skin margins were undermined to an appropriate distance in all directions utilizing iris scissors. O-Z Plasty Text: The defect edges were debeveled with a #15 scalpel blade.  Given the location of the defect, shape of the defect and the proximity to free margins an O-Z plasty (double transposition flap) was deemed most appropriate.  Using a sterile surgical marker, the appropriate transposition flaps were drawn incorporating the defect and placing the expected incisions within the relaxed skin tension lines where possible.    The area thus outlined was incised deep to adipose tissue with a #15 scalpel blade.  The skin margins were undermined to an appropriate distance in all directions utilizing iris scissors.  Hemostasis was achieved with electrocautery.  The flaps were then transposed into place, one clockwise and the other counterclockwise, and anchored with interrupted buried subcutaneous sutures. Double O-Z Plasty Text: The defect edges were debeveled with a #15 scalpel blade.  Given the location of the defect, shape of the defect and the proximity to free margins a Double O-Z plasty (double transposition flap) was deemed most appropriate.  Using a sterile surgical marker, the appropriate transposition flaps were drawn incorporating the defect and placing the expected incisions within the relaxed skin tension lines where possible. The area thus outlined was incised deep to adipose tissue with a #15 scalpel blade.  The skin margins were undermined to an appropriate distance in all directions utilizing iris scissors.  Hemostasis was achieved with electrocautery.  The flaps were then transposed into place, one clockwise and the other counterclockwise, and anchored with interrupted buried subcutaneous sutures. V-Y Plasty Text: The defect edges were debeveled with a #15 scalpel blade.  Given the location of the defect, shape of the defect and the proximity to free margins an V-Y advancement flap was deemed most appropriate.  Using a sterile surgical marker, an appropriate advancement flap was drawn incorporating the defect and placing the expected incisions within the relaxed skin tension lines where possible.    The area thus outlined was incised deep to adipose tissue with a #15 scalpel blade.  The skin margins were undermined to an appropriate distance in all directions utilizing iris scissors. H Plasty Text: Given the location of the defect, shape of the defect and the proximity to free margins a H-plasty was deemed most appropriate for repair.  Using a sterile surgical marker, the appropriate advancement arms of the H-plasty were drawn incorporating the defect and placing the expected incisions within the relaxed skin tension lines where possible. The area thus outlined was incised deep to adipose tissue with a #15 scalpel blade. The skin margins were undermined to an appropriate distance in all directions utilizing iris scissors.  The opposing advancement arms were then advanced into place in opposite direction and anchored with interrupted buried subcutaneous sutures. W Plasty Text: The lesion was extirpated to the level of the fat with a #15 scalpel blade.  Given the location of the defect, shape of the defect and the proximity to free margins a W-plasty was deemed most appropriate for repair.  Using a sterile surgical marker, the appropriate transposition arms of the W-plasty were drawn incorporating the defect and placing the expected incisions within the relaxed skin tension lines where possible.    The area thus outlined was incised deep to adipose tissue with a #15 scalpel blade.  The skin margins were undermined to an appropriate distance in all directions utilizing iris scissors.  The opposing transposition arms were then transposed into place in opposite direction and anchored with interrupted buried subcutaneous sutures. Z Plasty Text: The lesion was extirpated to the level of the fat with a #15 scalpel blade.  Given the location of the defect, shape of the defect and the proximity to free margins a Z-plasty was deemed most appropriate for repair.  Using a sterile surgical marker, the appropriate transposition arms of the Z-plasty were drawn incorporating the defect and placing the expected incisions within the relaxed skin tension lines where possible.    The area thus outlined was incised deep to adipose tissue with a #15 scalpel blade.  The skin margins were undermined to an appropriate distance in all directions utilizing iris scissors.  The opposing transposition arms were then transposed into place in opposite direction and anchored with interrupted buried subcutaneous sutures. Double Z Plasty Text: The lesion was extirpated to the level of the fat with a #15 scalpel blade. Given the location of the defect, shape of the defect and the proximity to free margins a double Z-plasty was deemed most appropriate for repair. Using a sterile surgical marker, the appropriate transposition arms of the double Z-plasty were drawn incorporating the defect and placing the expected incisions within the relaxed skin tension lines where possible. The area thus outlined was incised deep to adipose tissue with a #15 scalpel blade. The skin margins were undermined to an appropriate distance in all directions utilizing iris scissors. The opposing transposition arms were then transposed and carried over into place in opposite direction and anchored with interrupted buried subcutaneous sutures. Zygomaticofacial Flap Text: Given the location of the defect, shape of the defect and the proximity to free margins a zygomaticofacial flap was deemed most appropriate for repair.  Using a sterile surgical marker, the appropriate flap was drawn incorporating the defect and placing the expected incisions within the relaxed skin tension lines where possible. The area thus outlined was incised deep to adipose tissue with a #15 scalpel blade with preservation of a vascular pedicle.  The skin margins were undermined to an appropriate distance in all directions utilizing iris scissors.  The flap was then placed into the defect and anchored with interrupted buried subcutaneous sutures. Cheek Interpolation Flap Text: A decision was made to reconstruct the defect utilizing an interpolation axial flap and a staged reconstruction.  A telfa template was made of the defect.  This telfa template was then used to outline the Cheek Interpolation flap.  The donor area for the pedicle flap was then injected with anesthesia.  The flap was excised through the skin and subcutaneous tissue down to the layer of the underlying musculature.  The interpolation flap was carefully excised within this deep plane to maintain its blood supply.  The edges of the donor site were undermined.   The donor site was closed in a primary fashion.  The pedicle was then rotated into position and sutured.  Once the tube was sutured into place, adequate blood supply was confirmed with blanching and refill.  The pedicle was then wrapped with xeroform gauze and dressed appropriately with a telfa and gauze bandage to ensure continued blood supply and protect the attached pedicle. Cheek-To-Nose Interpolation Flap Text: A decision was made to reconstruct the defect utilizing an interpolation axial flap and a staged reconstruction.  A telfa template was made of the defect.  This telfa template was then used to outline the Cheek-To-Nose Interpolation flap.  The donor area for the pedicle flap was then injected with anesthesia.  The flap was excised through the skin and subcutaneous tissue down to the layer of the underlying musculature.  The interpolation flap was carefully excised within this deep plane to maintain its blood supply.  The edges of the donor site were undermined.   The donor site was closed in a primary fashion.  The pedicle was then rotated into position and sutured.  Once the tube was sutured into place, adequate blood supply was confirmed with blanching and refill.  The pedicle was then wrapped with xeroform gauze and dressed appropriately with a telfa and gauze bandage to ensure continued blood supply and protect the attached pedicle. Interpolation Flap Text: A decision was made to reconstruct the defect utilizing an interpolation axial flap and a staged reconstruction.  A telfa template was made of the defect.  This telfa template was then used to outline the interpolation flap.  The donor area for the pedicle flap was then injected with anesthesia.  The flap was excised through the skin and subcutaneous tissue down to the layer of the underlying musculature.  The interpolation flap was carefully excised within this deep plane to maintain its blood supply.  The edges of the donor site were undermined.   The donor site was closed in a primary fashion.  The pedicle was then rotated into position and sutured.  Once the tube was sutured into place, adequate blood supply was confirmed with blanching and refill.  The pedicle was then wrapped with xeroform gauze and dressed appropriately with a telfa and gauze bandage to ensure continued blood supply and protect the attached pedicle. Melolabial Interpolation Flap Text: A decision was made to reconstruct the defect utilizing an interpolation axial flap and a staged reconstruction.  A telfa template was made of the defect.  This telfa template was then used to outline the melolabial interpolation flap.  The donor area for the pedicle flap was then injected with anesthesia.  The flap was excised through the skin and subcutaneous tissue down to the layer of the underlying musculature.  The pedicle flap was carefully excised within this deep plane to maintain its blood supply.  The edges of the donor site were undermined.   The donor site was closed in a primary fashion.  The pedicle was then rotated into position and sutured.  Once the tube was sutured into place, adequate blood supply was confirmed with blanching and refill.  The pedicle was then wrapped with xeroform gauze and dressed appropriately with a telfa and gauze bandage to ensure continued blood supply and protect the attached pedicle. Mastoid Interpolation Flap Text: A decision was made to reconstruct the defect utilizing an interpolation axial flap and a staged reconstruction.  A telfa template was made of the defect.  This telfa template was then used to outline the mastoid interpolation flap.  The donor area for the pedicle flap was then injected with anesthesia.  The flap was excised through the skin and subcutaneous tissue down to the layer of the underlying musculature.  The pedicle flap was carefully excised within this deep plane to maintain its blood supply.  The edges of the donor site were undermined.   The donor site was closed in a primary fashion.  The pedicle was then rotated into position and sutured.  Once the tube was sutured into place, adequate blood supply was confirmed with blanching and refill.  The pedicle was then wrapped with xeroform gauze and dressed appropriately with a telfa and gauze bandage to ensure continued blood supply and protect the attached pedicle. Posterior Auricular Interpolation Flap Text: A decision was made to reconstruct the defect utilizing an interpolation axial flap and a staged reconstruction.  A telfa template was made of the defect.  This telfa template was then used to outline the posterior auricular interpolation flap.  The donor area for the pedicle flap was then injected with anesthesia.  The flap was excised through the skin and subcutaneous tissue down to the layer of the underlying musculature.  The pedicle flap was carefully excised within this deep plane to maintain its blood supply.  The edges of the donor site were undermined.   The donor site was closed in a primary fashion.  The pedicle was then rotated into position and sutured.  Once the tube was sutured into place, adequate blood supply was confirmed with blanching and refill.  The pedicle was then wrapped with xeroform gauze and dressed appropriately with a telfa and gauze bandage to ensure continued blood supply and protect the attached pedicle. Paramedian Forehead Flap Text: A decision was made to reconstruct the defect utilizing an interpolation axial flap and a staged reconstruction.  A telfa template was made of the defect.  This telfa template was then used to outline the paramedian forehead pedicle flap.  The donor area for the pedicle flap was then injected with anesthesia.  The flap was excised through the skin and subcutaneous tissue down to the layer of the underlying musculature.  The pedicle flap was carefully excised within this deep plane to maintain its blood supply.  The edges of the donor site were undermined.   The donor site was closed in a primary fashion.  The pedicle was then rotated into position and sutured.  Once the tube was sutured into place, adequate blood supply was confirmed with blanching and refill.  The pedicle was then wrapped with xeroform gauze and dressed appropriately with a telfa and gauze bandage to ensure continued blood supply and protect the attached pedicle. Lip Wedge Excision Repair Text: Given the location of the defect and the proximity to free margins a full thickness wedge repair was deemed most appropriate.  Using a sterile surgical marker, the appropriate repair was drawn incorporating the defect and placing the expected incisions perpendicular to the vermilion border.  The vermilion border was also meticulously outlined to ensure appropriate reapproximation during the repair.  The area thus outlined was incised through and through with a #15 scalpel blade.  The muscularis and dermis were reaproximated with deep sutures following hemostasis. Care was taken to realign the vermilion border before proceeding with the superficial closure.  Once the vermilion was realigned the superfical and mucosal closure was finished. Ftsg Text: The defect edges were debeveled with a #15 scalpel blade.  Given the location of the defect, shape of the defect and the proximity to free margins a full thickness skin graft was deemed most appropriate.  Using a sterile surgical marker, the primary defect shape was transferred to the donor site. The area thus outlined was incised deep to adipose tissue with a #15 scalpel blade.  The harvested graft was then trimmed of adipose tissue until only dermis and epidermis was left.  The skin margins of the secondary defect were undermined to an appropriate distance in all directions utilizing iris scissors.  The secondary defect was closed with interrupted buried subcutaneous sutures.  The skin edges were then re-apposed with running  sutures.  The skin graft was then placed in the primary defect and oriented appropriately. Split-Thickness Skin Graft Text: The defect edges were debeveled with a #15 scalpel blade.  Given the location of the defect, shape of the defect and the proximity to free margins a split thickness skin graft was deemed most appropriate.  Using a sterile surgical marker, the primary defect shape was transferred to the donor site. The split thickness graft was then harvested.  The skin graft was then placed in the primary defect and oriented appropriately. Pinch Graft Text: The defect edges were debeveled with a #15 scalpel blade. Given the location of the defect, shape of the defect and the proximity to free margins a pinch graft was deemed most appropriate. Using a sterile surgical marker, the primary defect shape was transferred to the donor site. The area thus outlined was incised deep to adipose tissue with a #15 scalpel blade.  The harvested graft was then trimmed of adipose tissue until only dermis and epidermis was left. The skin margins of the secondary defect were undermined to an appropriate distance in all directions utilizing iris scissors.  The secondary defect was closed with interrupted buried subcutaneous sutures.  The skin edges were then re-apposed with running  sutures.  The skin graft was then placed in the primary defect and oriented appropriately. Burow's Graft Text: The defect edges were debeveled with a #15 scalpel blade.  Given the location of the defect, shape of the defect, the proximity to free margins and the presence of a standing cone deformity a Burow's skin graft was deemed most appropriate. The standing cone was removed and this tissue was then trimmed to the shape of the primary defect. The adipose tissue was also removed until only dermis and epidermis were left.  The skin margins of the secondary defect were undermined to an appropriate distance in all directions utilizing iris scissors.  The secondary defect was closed with interrupted buried subcutaneous sutures.  The skin edges were then re-apposed with running  sutures.  The skin graft was then placed in the primary defect and oriented appropriately. Cartilage Graft Text: The defect edges were debeveled with a #15 scalpel blade.  Given the location of the defect, shape of the defect, the fact the defect involved a full thickness cartilage defect a cartilage graft was deemed most appropriate.  An appropriate donor site was identified, cleansed, and anesthetized. The cartilage graft was then harvested and transferred to the recipient site, oriented appropriately and then sutured into place.  The secondary defect was then repaired using a primary closure. Composite Graft Text: The defect edges were debeveled with a #15 scalpel blade.  Given the location of the defect, shape of the defect, the proximity to free margins and the fact the defect was full thickness a composite graft was deemed most appropriate.  The defect was outline and then transferred to the donor site.  A full thickness graft was then excised from the donor site. The graft was then placed in the primary defect, oriented appropriately and then sutured into place.  The secondary defect was then repaired using a primary closure. Epidermal Autograft Text: The defect edges were debeveled with a #15 scalpel blade.  Given the location of the defect, shape of the defect and the proximity to free margins an epidermal autograft was deemed most appropriate.  Using a sterile surgical marker, the primary defect shape was transferred to the donor site. The epidermal graft was then harvested.  The skin graft was then placed in the primary defect and oriented appropriately. Dermal Autograft Text: The defect edges were debeveled with a #15 scalpel blade.  Given the location of the defect, shape of the defect and the proximity to free margins a dermal autograft was deemed most appropriate.  Using a sterile surgical marker, the primary defect shape was transferred to the donor site. The area thus outlined was incised deep to adipose tissue with a #15 scalpel blade.  The harvested graft was then trimmed of adipose and epidermal tissue until only dermis was left.  The skin graft was then placed in the primary defect and oriented appropriately. Skin Substitute Text: The defect edges were debeveled with a #15 scalpel blade.  Given the location of the defect, shape of the defect and the proximity to free margins a skin substitute graft was deemed most appropriate.  The graft material was trimmed to fit the size of the defect. The graft was then placed in the primary defect and oriented appropriately. Tissue Cultured Epidermal Autograft Text: The defect edges were debeveled with a #15 scalpel blade.  Given the location of the defect, shape of the defect and the proximity to free margins a tissue cultured epidermal autograft was deemed most appropriate.  The graft was then trimmed to fit the size of the defect.  The graft was then placed in the primary defect and oriented appropriately. Xenograft Text: The defect edges were debeveled with a #15 scalpel blade.  Given the location of the defect, shape of the defect and the proximity to free margins a xenograft was deemed most appropriate.  The graft was then trimmed to fit the size of the defect.  The graft was then placed in the primary defect and oriented appropriately. Purse String (Intermediate) Text: Given the location of the defect and the characteristics of the surrounding skin a purse string intermediate closure was deemed most appropriate.  Undermining was performed circumferentially around the surgical defect.  A purse string suture was then placed and tightened. Purse String (Simple) Text: Given the location of the defect and the characteristics of the surrounding skin a purse string simple closure was deemed most appropriate.  Undermining was performed circumferentially around the surgical defect.  A purse string suture was then placed and tightened. Complex Repair And Single Advancement Flap Text: The defect edges were debeveled with a #15 scalpel blade.  The primary defect was closed partially with a complex linear closure.  Given the location of the remaining defect, shape of the defect and the proximity to free margins a single advancement flap was deemed most appropriate for complete closure of the defect.  Using a sterile surgical marker, an appropriate advancement flap was drawn incorporating the defect and placing the expected incisions within the relaxed skin tension lines where possible.    The area thus outlined was incised deep to adipose tissue with a #15 scalpel blade.  The skin margins were undermined to an appropriate distance in all directions utilizing iris scissors. Complex Repair And Double Advancement Flap Text: The defect edges were debeveled with a #15 scalpel blade.  The primary defect was closed partially with a complex linear closure.  Given the location of the remaining defect, shape of the defect and the proximity to free margins a double advancement flap was deemed most appropriate for complete closure of the defect.  Using a sterile surgical marker, an appropriate advancement flap was drawn incorporating the defect and placing the expected incisions within the relaxed skin tension lines where possible.    The area thus outlined was incised deep to adipose tissue with a #15 scalpel blade.  The skin margins were undermined to an appropriate distance in all directions utilizing iris scissors. Complex Repair And Modified Advancement Flap Text: The defect edges were debeveled with a #15 scalpel blade.  The primary defect was closed partially with a complex linear closure.  Given the location of the remaining defect, shape of the defect and the proximity to free margins a modified advancement flap was deemed most appropriate for complete closure of the defect.  Using a sterile surgical marker, an appropriate advancement flap was drawn incorporating the defect and placing the expected incisions within the relaxed skin tension lines where possible.    The area thus outlined was incised deep to adipose tissue with a #15 scalpel blade.  The skin margins were undermined to an appropriate distance in all directions utilizing iris scissors. Complex Repair And A-T Advancement Flap Text: The defect edges were debeveled with a #15 scalpel blade.  The primary defect was closed partially with a complex linear closure.  Given the location of the remaining defect, shape of the defect and the proximity to free margins an A-T advancement flap was deemed most appropriate for complete closure of the defect.  Using a sterile surgical marker, an appropriate advancement flap was drawn incorporating the defect and placing the expected incisions within the relaxed skin tension lines where possible.    The area thus outlined was incised deep to adipose tissue with a #15 scalpel blade.  The skin margins were undermined to an appropriate distance in all directions utilizing iris scissors. Complex Repair And O-T Advancement Flap Text: The defect edges were debeveled with a #15 scalpel blade.  The primary defect was closed partially with a complex linear closure.  Given the location of the remaining defect, shape of the defect and the proximity to free margins an O-T advancement flap was deemed most appropriate for complete closure of the defect.  Using a sterile surgical marker, an appropriate advancement flap was drawn incorporating the defect and placing the expected incisions within the relaxed skin tension lines where possible.    The area thus outlined was incised deep to adipose tissue with a #15 scalpel blade.  The skin margins were undermined to an appropriate distance in all directions utilizing iris scissors. Complex Repair And O-L Flap Text: The defect edges were debeveled with a #15 scalpel blade.  The primary defect was closed partially with a complex linear closure.  Given the location of the remaining defect, shape of the defect and the proximity to free margins an O-L flap was deemed most appropriate for complete closure of the defect.  Using a sterile surgical marker, an appropriate flap was drawn incorporating the defect and placing the expected incisions within the relaxed skin tension lines where possible.    The area thus outlined was incised deep to adipose tissue with a #15 scalpel blade.  The skin margins were undermined to an appropriate distance in all directions utilizing iris scissors. Complex Repair And Bilobe Flap Text: The defect edges were debeveled with a #15 scalpel blade.  The primary defect was closed partially with a complex linear closure.  Given the location of the remaining defect, shape of the defect and the proximity to free margins a bilobe flap was deemed most appropriate for complete closure of the defect.  Using a sterile surgical marker, an appropriate advancement flap was drawn incorporating the defect and placing the expected incisions within the relaxed skin tension lines where possible.    The area thus outlined was incised deep to adipose tissue with a #15 scalpel blade.  The skin margins were undermined to an appropriate distance in all directions utilizing iris scissors. Complex Repair And Melolabial Flap Text: The defect edges were debeveled with a #15 scalpel blade.  The primary defect was closed partially with a complex linear closure.  Given the location of the remaining defect, shape of the defect and the proximity to free margins a melolabial flap was deemed most appropriate for complete closure of the defect.  Using a sterile surgical marker, an appropriate advancement flap was drawn incorporating the defect and placing the expected incisions within the relaxed skin tension lines where possible.    The area thus outlined was incised deep to adipose tissue with a #15 scalpel blade.  The skin margins were undermined to an appropriate distance in all directions utilizing iris scissors. Complex Repair And Rotation Flap Text: The defect edges were debeveled with a #15 scalpel blade.  The primary defect was closed partially with a complex linear closure.  Given the location of the remaining defect, shape of the defect and the proximity to free margins a rotation flap was deemed most appropriate for complete closure of the defect.  Using a sterile surgical marker, an appropriate advancement flap was drawn incorporating the defect and placing the expected incisions within the relaxed skin tension lines where possible.    The area thus outlined was incised deep to adipose tissue with a #15 scalpel blade.  The skin margins were undermined to an appropriate distance in all directions utilizing iris scissors. Complex Repair And Rhombic Flap Text: The defect edges were debeveled with a #15 scalpel blade.  The primary defect was closed partially with a complex linear closure.  Given the location of the remaining defect, shape of the defect and the proximity to free margins a rhombic flap was deemed most appropriate for complete closure of the defect.  Using a sterile surgical marker, an appropriate advancement flap was drawn incorporating the defect and placing the expected incisions within the relaxed skin tension lines where possible.    The area thus outlined was incised deep to adipose tissue with a #15 scalpel blade.  The skin margins were undermined to an appropriate distance in all directions utilizing iris scissors. Complex Repair And Transposition Flap Text: The defect edges were debeveled with a #15 scalpel blade.  The primary defect was closed partially with a complex linear closure.  Given the location of the remaining defect, shape of the defect and the proximity to free margins a transposition flap was deemed most appropriate for complete closure of the defect.  Using a sterile surgical marker, an appropriate advancement flap was drawn incorporating the defect and placing the expected incisions within the relaxed skin tension lines where possible.    The area thus outlined was incised deep to adipose tissue with a #15 scalpel blade.  The skin margins were undermined to an appropriate distance in all directions utilizing iris scissors. Complex Repair And V-Y Plasty Text: The defect edges were debeveled with a #15 scalpel blade.  The primary defect was closed partially with a complex linear closure.  Given the location of the remaining defect, shape of the defect and the proximity to free margins a V-Y plasty was deemed most appropriate for complete closure of the defect.  Using a sterile surgical marker, an appropriate advancement flap was drawn incorporating the defect and placing the expected incisions within the relaxed skin tension lines where possible.    The area thus outlined was incised deep to adipose tissue with a #15 scalpel blade.  The skin margins were undermined to an appropriate distance in all directions utilizing iris scissors. Complex Repair And M Plasty Text: The defect edges were debeveled with a #15 scalpel blade.  The primary defect was closed partially with a complex linear closure.  Given the location of the remaining defect, shape of the defect and the proximity to free margins an M plasty was deemed most appropriate for complete closure of the defect.  Using a sterile surgical marker, an appropriate advancement flap was drawn incorporating the defect and placing the expected incisions within the relaxed skin tension lines where possible.    The area thus outlined was incised deep to adipose tissue with a #15 scalpel blade.  The skin margins were undermined to an appropriate distance in all directions utilizing iris scissors. Complex Repair And Double M Plasty Text: The defect edges were debeveled with a #15 scalpel blade.  The primary defect was closed partially with a complex linear closure.  Given the location of the remaining defect, shape of the defect and the proximity to free margins a double M plasty was deemed most appropriate for complete closure of the defect.  Using a sterile surgical marker, an appropriate advancement flap was drawn incorporating the defect and placing the expected incisions within the relaxed skin tension lines where possible.    The area thus outlined was incised deep to adipose tissue with a #15 scalpel blade.  The skin margins were undermined to an appropriate distance in all directions utilizing iris scissors. Complex Repair And W Plasty Text: The defect edges were debeveled with a #15 scalpel blade.  The primary defect was closed partially with a complex linear closure.  Given the location of the remaining defect, shape of the defect and the proximity to free margins a W plasty was deemed most appropriate for complete closure of the defect.  Using a sterile surgical marker, an appropriate advancement flap was drawn incorporating the defect and placing the expected incisions within the relaxed skin tension lines where possible.    The area thus outlined was incised deep to adipose tissue with a #15 scalpel blade.  The skin margins were undermined to an appropriate distance in all directions utilizing iris scissors. Complex Repair And Z Plasty Text: The defect edges were debeveled with a #15 scalpel blade.  The primary defect was closed partially with a complex linear closure.  Given the location of the remaining defect, shape of the defect and the proximity to free margins a Z plasty was deemed most appropriate for complete closure of the defect.  Using a sterile surgical marker, an appropriate advancement flap was drawn incorporating the defect and placing the expected incisions within the relaxed skin tension lines where possible.    The area thus outlined was incised deep to adipose tissue with a #15 scalpel blade.  The skin margins were undermined to an appropriate distance in all directions utilizing iris scissors. Complex Repair And Dorsal Nasal Flap Text: The defect edges were debeveled with a #15 scalpel blade.  The primary defect was closed partially with a complex linear closure.  Given the location of the remaining defect, shape of the defect and the proximity to free margins a dorsal nasal flap was deemed most appropriate for complete closure of the defect.  Using a sterile surgical marker, an appropriate flap was drawn incorporating the defect and placing the expected incisions within the relaxed skin tension lines where possible.    The area thus outlined was incised deep to adipose tissue with a #15 scalpel blade.  The skin margins were undermined to an appropriate distance in all directions utilizing iris scissors. Complex Repair And Ftsg Text: The defect edges were debeveled with a #15 scalpel blade.  The primary defect was closed partially with a complex linear closure.  Given the location of the defect, shape of the defect and the proximity to free margins a full thickness skin graft was deemed most appropriate to repair the remaining defect.  The graft was trimmed to fit the size of the remaining defect.  The graft was then placed in the primary defect, oriented appropriately, and sutured into place. Complex Repair And Burow's Graft Text: The defect edges were debeveled with a #15 scalpel blade.  The primary defect was closed partially with a complex linear closure.  Given the location of the defect, shape of the defect, the proximity to free margins and the presence of a standing cone deformity a Burow's graft was deemed most appropriate to repair the remaining defect.  The graft was trimmed to fit the size of the remaining defect.  The graft was then placed in the primary defect, oriented appropriately, and sutured into place. Complex Repair And Split-Thickness Skin Graft Text: The defect edges were debeveled with a #15 scalpel blade.  The primary defect was closed partially with a complex linear closure.  Given the location of the defect, shape of the defect and the proximity to free margins a split thickness skin graft was deemed most appropriate to repair the remaining defect.  The graft was trimmed to fit the size of the remaining defect.  The graft was then placed in the primary defect, oriented appropriately, and sutured into place. Complex Repair And Epidermal Autograft Text: The defect edges were debeveled with a #15 scalpel blade.  The primary defect was closed partially with a complex linear closure.  Given the location of the defect, shape of the defect and the proximity to free margins an epidermal autograft was deemed most appropriate to repair the remaining defect.  The graft was trimmed to fit the size of the remaining defect.  The graft was then placed in the primary defect, oriented appropriately, and sutured into place. Complex Repair And Dermal Autograft Text: The defect edges were debeveled with a #15 scalpel blade.  The primary defect was closed partially with a complex linear closure.  Given the location of the defect, shape of the defect and the proximity to free margins an dermal autograft was deemed most appropriate to repair the remaining defect.  The graft was trimmed to fit the size of the remaining defect.  The graft was then placed in the primary defect, oriented appropriately, and sutured into place. Complex Repair And Tissue Cultured Epidermal Autograft Text: The defect edges were debeveled with a #15 scalpel blade.  The primary defect was closed partially with a complex linear closure.  Given the location of the defect, shape of the defect and the proximity to free margins an tissue cultured epidermal autograft was deemed most appropriate to repair the remaining defect.  The graft was trimmed to fit the size of the remaining defect.  The graft was then placed in the primary defect, oriented appropriately, and sutured into place. Complex Repair And Xenograft Text: The defect edges were debeveled with a #15 scalpel blade.  The primary defect was closed partially with a complex linear closure.  Given the location of the defect, shape of the defect and the proximity to free margins a xenograft was deemed most appropriate to repair the remaining defect.  The graft was trimmed to fit the size of the remaining defect.  The graft was then placed in the primary defect, oriented appropriately, and sutured into place. Complex Repair And Skin Substitute Graft Text: The defect edges were debeveled with a #15 scalpel blade.  The primary defect was closed partially with a complex linear closure.  Given the location of the remaining defect, shape of the defect and the proximity to free margins a skin substitute graft was deemed most appropriate to repair the remaining defect.  The graft was trimmed to fit the size of the remaining defect.  The graft was then placed in the primary defect, oriented appropriately, and sutured into place. Consent was obtained from the patient. The risks and benefits to therapy were discussed in detail. Specifically, the risks of infection, scarring, bleeding, prolonged wound healing, incomplete removal, allergy to anesthesia, nerve injury and recurrence were addressed. Prior to the procedure, the treatment site was clearly identified and confirmed by the patient. All components of Universal Protocol/PAUSE Rule completed. Post-Care Instructions: I reviewed with the patient in detail post-care instructions. Patient is not to engage in any heavy lifting, exercise, or swimming for the next 14 days. Should the patient develop any fevers, chills, bleeding, severe pain patient will contact the office immediately. Home Suture Removal Text: Patient was provided a home suture removal kit and will remove their sutures at home.  If they have any questions or difficulties they will call the office. Where Do You Want The Question To Include Opioid Counseling Located?: Case Summary Tab Billing Type: Third-Party Bill Information: Selecting Yes will display possible errors in your note based on the variables you have selected. This validation is only offered as a suggestion for you. PLEASE NOTE THAT THE VALIDATION TEXT WILL BE REMOVED WHEN YOU FINALIZE YOUR NOTE. IF YOU WANT TO FAX A PRELIMINARY NOTE YOU WILL NEED TO TOGGLE THIS TO 'NO' IF YOU DO NOT WANT IT IN YOUR FAXED NOTE.

## 2024-01-25 NOTE — ASSESSMENT & PLAN NOTE
- Patient initially noted to have parkinsonian symptoms on examination during admission in October; was started on Sinemet TID ( 25/100) but due to intolerance stopped the medication at home  - [facility] appointment to establish care was cancelled by the patient  - Wife would not like to re-start the Sinemet at this time  - Recommend to increased Gabapentin dose from 100 QHS to 300 QHS and eventually up-titrate to 300 mg TID for full effect.   - Follow up w/ [facility] is recommended upon discharge    Navigator called [employer] to obtain a status on the patient assistance program application. Per representative patient is approved until 1/31/25. Medication is set to get deliver to PCP's office in 10-14 days from today.    [employer]  Patient ID number: [id number]

## 2024-03-01 NOTE — PLAN OF CARE
Pt presents c/o anxiety, HTN, bilateral leg swelling. Pt states he became very anxious today because he knew he needed to come and be evaluated for leg swelling. Systolic  by EMS. Pt denies chest pain, no SOB. Pt received 324 of aspirin and 1 nitro by EMS. Pt has not been taking BP meds x 2 months.    Voicemail message left for the patient's spouse, Alem Dey ([phone]), informing of IMM & requested a return call. Plan to discharge patient to [facility] today. Will continnue to follow.